# Patient Record
Sex: FEMALE | Race: WHITE | Employment: UNEMPLOYED | URBAN - METROPOLITAN AREA
[De-identification: names, ages, dates, MRNs, and addresses within clinical notes are randomized per-mention and may not be internally consistent; named-entity substitution may affect disease eponyms.]

---

## 2020-07-28 ENCOUNTER — TELEPHONE (OUTPATIENT)
Dept: ADMINISTRATIVE | Age: 60
End: 2020-07-28

## 2020-08-23 ENCOUNTER — HOSPITAL ENCOUNTER (INPATIENT)
Age: 60
LOS: 19 days | Discharge: NURSING, REHAB OR LONG-TERM CARE | DRG: 025 | End: 2020-09-11
Attending: EMERGENCY MEDICINE | Admitting: HOSPITALIST
Payer: COMMERCIAL

## 2020-08-23 ENCOUNTER — APPOINTMENT (OUTPATIENT)
Dept: ULTRASOUND IMAGING | Age: 60
DRG: 025 | End: 2020-08-23
Payer: COMMERCIAL

## 2020-08-23 ENCOUNTER — APPOINTMENT (OUTPATIENT)
Dept: GENERAL RADIOLOGY | Age: 60
DRG: 025 | End: 2020-08-23
Payer: COMMERCIAL

## 2020-08-23 PROBLEM — I21.4 NSTEMI (NON-ST ELEVATED MYOCARDIAL INFARCTION) (HCC): Status: ACTIVE | Noted: 2020-08-23

## 2020-08-23 PROBLEM — I21.4 NON-ST ELEVATION MI (NSTEMI) (HCC): Status: ACTIVE | Noted: 2020-08-23

## 2020-08-23 LAB
ACETAMINOPHEN LEVEL: <5 MCG/ML (ref 10–30)
ALBUMIN SERPL-MCNC: 4.2 G/DL (ref 3.5–5.2)
ALP BLD-CCNC: 54 U/L (ref 35–104)
ALT SERPL-CCNC: 27 U/L (ref 0–32)
AMPHETAMINE SCREEN, URINE: NOT DETECTED
ANION GAP SERPL CALCULATED.3IONS-SCNC: 14 MMOL/L (ref 7–16)
APTT: 29.6 SEC (ref 24.5–35.1)
AST SERPL-CCNC: 30 U/L (ref 0–31)
BACTERIA: ABNORMAL /HPF
BARBITURATE SCREEN URINE: NOT DETECTED
BASOPHILS ABSOLUTE: 0.06 E9/L (ref 0–0.2)
BASOPHILS RELATIVE PERCENT: 0.6 % (ref 0–2)
BENZODIAZEPINE SCREEN, URINE: NOT DETECTED
BILIRUB SERPL-MCNC: 1 MG/DL (ref 0–1.2)
BILIRUBIN URINE: NEGATIVE
BLOOD, URINE: ABNORMAL
BUN BLDV-MCNC: 19 MG/DL (ref 8–23)
CALCIUM SERPL-MCNC: 9.2 MG/DL (ref 8.6–10.2)
CANNABINOID SCREEN URINE: NOT DETECTED
CHLORIDE BLD-SCNC: 102 MMOL/L (ref 98–107)
CLARITY: CLEAR
CO2: 25 MMOL/L (ref 22–29)
COCAINE METABOLITE SCREEN URINE: NOT DETECTED
COLOR: YELLOW
CREAT SERPL-MCNC: 0.7 MG/DL (ref 0.5–1)
EKG ATRIAL RATE: 80 BPM
EKG P AXIS: 16 DEGREES
EKG P-R INTERVAL: 148 MS
EKG Q-T INTERVAL: 392 MS
EKG QRS DURATION: 84 MS
EKG QTC CALCULATION (BAZETT): 452 MS
EKG R AXIS: -21 DEGREES
EKG T AXIS: 15 DEGREES
EKG VENTRICULAR RATE: 80 BPM
EOSINOPHILS ABSOLUTE: 0.13 E9/L (ref 0.05–0.5)
EOSINOPHILS RELATIVE PERCENT: 1.4 % (ref 0–6)
ETHANOL: <10 MG/DL (ref 0–0.08)
FENTANYL SCREEN, URINE: NOT DETECTED
GFR AFRICAN AMERICAN: >60
GFR NON-AFRICAN AMERICAN: >60 ML/MIN/1.73
GLUCOSE BLD-MCNC: 107 MG/DL (ref 74–99)
GLUCOSE URINE: NEGATIVE MG/DL
HCT VFR BLD CALC: 45.8 % (ref 34–48)
HEMOGLOBIN: 14.8 G/DL (ref 11.5–15.5)
IMMATURE GRANULOCYTES #: 0.02 E9/L
IMMATURE GRANULOCYTES %: 0.2 % (ref 0–5)
INR BLD: 1.1
KETONES, URINE: ABNORMAL MG/DL
LEUKOCYTE ESTERASE, URINE: ABNORMAL
LYMPHOCYTES ABSOLUTE: 1.73 E9/L (ref 1.5–4)
LYMPHOCYTES RELATIVE PERCENT: 18.2 % (ref 20–42)
Lab: NORMAL
MAGNESIUM: 2.1 MG/DL (ref 1.6–2.6)
MCH RBC QN AUTO: 28.6 PG (ref 26–35)
MCHC RBC AUTO-ENTMCNC: 32.3 % (ref 32–34.5)
MCV RBC AUTO: 88.4 FL (ref 80–99.9)
METHADONE SCREEN, URINE: NOT DETECTED
MONOCYTES ABSOLUTE: 0.97 E9/L (ref 0.1–0.95)
MONOCYTES RELATIVE PERCENT: 10.2 % (ref 2–12)
NEUTROPHILS ABSOLUTE: 6.57 E9/L (ref 1.8–7.3)
NEUTROPHILS RELATIVE PERCENT: 69.4 % (ref 43–80)
NITRITE, URINE: NEGATIVE
OPIATE SCREEN URINE: NOT DETECTED
OXYCODONE URINE: NOT DETECTED
PDW BLD-RTO: 13.4 FL (ref 11.5–15)
PH UA: 7 (ref 5–9)
PHENCYCLIDINE SCREEN URINE: NOT DETECTED
PLATELET # BLD: 247 E9/L (ref 130–450)
PMV BLD AUTO: 11.2 FL (ref 7–12)
POTASSIUM SERPL-SCNC: 2.9 MMOL/L (ref 3.5–5)
PROTEIN UA: ABNORMAL MG/DL
PROTHROMBIN TIME: 12.6 SEC (ref 9.3–12.4)
RBC # BLD: 5.18 E12/L (ref 3.5–5.5)
RBC UA: ABNORMAL /HPF (ref 0–2)
REASON FOR REJECTION: NORMAL
REJECTED TEST: NORMAL
SALICYLATE, SERUM: <0.3 MG/DL (ref 0–30)
SODIUM BLD-SCNC: 141 MMOL/L (ref 132–146)
SPECIFIC GRAVITY UA: 1.01 (ref 1–1.03)
TOTAL PROTEIN: 7.7 G/DL (ref 6.4–8.3)
TRICYCLIC ANTIDEPRESSANTS SCREEN SERUM: NEGATIVE NG/ML
TROPONIN: 0.14 NG/ML (ref 0–0.03)
TROPONIN: 0.14 NG/ML (ref 0–0.03)
TROPONIN: 0.15 NG/ML (ref 0–0.03)
UROBILINOGEN, URINE: 0.2 E.U./DL
WBC # BLD: 9.5 E9/L (ref 4.5–11.5)
WBC UA: ABNORMAL /HPF (ref 0–5)

## 2020-08-23 PROCEDURE — 2500000003 HC RX 250 WO HCPCS: Performed by: NURSE PRACTITIONER

## 2020-08-23 PROCEDURE — 93970 EXTREMITY STUDY: CPT

## 2020-08-23 PROCEDURE — 93005 ELECTROCARDIOGRAM TRACING: CPT | Performed by: NURSE PRACTITIONER

## 2020-08-23 PROCEDURE — 99284 EMERGENCY DEPT VISIT MOD MDM: CPT

## 2020-08-23 PROCEDURE — 84484 ASSAY OF TROPONIN QUANT: CPT

## 2020-08-23 PROCEDURE — 71045 X-RAY EXAM CHEST 1 VIEW: CPT

## 2020-08-23 PROCEDURE — 81001 URINALYSIS AUTO W/SCOPE: CPT

## 2020-08-23 PROCEDURE — 83735 ASSAY OF MAGNESIUM: CPT

## 2020-08-23 PROCEDURE — 85025 COMPLETE CBC W/AUTO DIFF WBC: CPT

## 2020-08-23 PROCEDURE — 6370000000 HC RX 637 (ALT 250 FOR IP): Performed by: NURSE PRACTITIONER

## 2020-08-23 PROCEDURE — 2060000000 HC ICU INTERMEDIATE R&B

## 2020-08-23 PROCEDURE — 99285 EMERGENCY DEPT VISIT HI MDM: CPT

## 2020-08-23 PROCEDURE — 85610 PROTHROMBIN TIME: CPT

## 2020-08-23 PROCEDURE — 93010 ELECTROCARDIOGRAM REPORT: CPT | Performed by: INTERNAL MEDICINE

## 2020-08-23 PROCEDURE — 6370000000 HC RX 637 (ALT 250 FOR IP): Performed by: INTERNAL MEDICINE

## 2020-08-23 PROCEDURE — 80307 DRUG TEST PRSMV CHEM ANLYZR: CPT

## 2020-08-23 PROCEDURE — 85730 THROMBOPLASTIN TIME PARTIAL: CPT

## 2020-08-23 PROCEDURE — 2580000003 HC RX 258: Performed by: INTERNAL MEDICINE

## 2020-08-23 PROCEDURE — G0480 DRUG TEST DEF 1-7 CLASSES: HCPCS

## 2020-08-23 PROCEDURE — 80053 COMPREHEN METABOLIC PANEL: CPT

## 2020-08-23 PROCEDURE — 36415 COLL VENOUS BLD VENIPUNCTURE: CPT

## 2020-08-23 PROCEDURE — 6360000002 HC RX W HCPCS: Performed by: INTERNAL MEDICINE

## 2020-08-23 RX ORDER — METOPROLOL TARTRATE 5 MG/5ML
5 INJECTION INTRAVENOUS ONCE
Status: COMPLETED | OUTPATIENT
Start: 2020-08-23 | End: 2020-08-23

## 2020-08-23 RX ORDER — HYDRALAZINE HYDROCHLORIDE 20 MG/ML
5 INJECTION INTRAMUSCULAR; INTRAVENOUS EVERY 6 HOURS PRN
Status: DISCONTINUED | OUTPATIENT
Start: 2020-08-23 | End: 2020-09-09

## 2020-08-23 RX ORDER — POTASSIUM CHLORIDE 20 MEQ/1
40 TABLET, EXTENDED RELEASE ORAL ONCE
Status: COMPLETED | OUTPATIENT
Start: 2020-08-23 | End: 2020-08-23

## 2020-08-23 RX ORDER — ACETAMINOPHEN 650 MG/1
650 SUPPOSITORY RECTAL EVERY 6 HOURS PRN
Status: DISCONTINUED | OUTPATIENT
Start: 2020-08-23 | End: 2020-08-29

## 2020-08-23 RX ORDER — PROMETHAZINE HYDROCHLORIDE 25 MG/1
12.5 TABLET ORAL EVERY 6 HOURS PRN
Status: DISCONTINUED | OUTPATIENT
Start: 2020-08-23 | End: 2020-08-29

## 2020-08-23 RX ORDER — SODIUM CHLORIDE 0.9 % (FLUSH) 0.9 %
10 SYRINGE (ML) INJECTION EVERY 12 HOURS SCHEDULED
Status: DISCONTINUED | OUTPATIENT
Start: 2020-08-23 | End: 2020-09-11 | Stop reason: HOSPADM

## 2020-08-23 RX ORDER — ONDANSETRON 2 MG/ML
4 INJECTION INTRAMUSCULAR; INTRAVENOUS EVERY 6 HOURS PRN
Status: DISCONTINUED | OUTPATIENT
Start: 2020-08-23 | End: 2020-09-11 | Stop reason: HOSPADM

## 2020-08-23 RX ORDER — ASPIRIN 81 MG/1
81 TABLET, CHEWABLE ORAL DAILY
Status: DISCONTINUED | OUTPATIENT
Start: 2020-08-24 | End: 2020-08-28

## 2020-08-23 RX ORDER — ATORVASTATIN CALCIUM 80 MG/1
80 TABLET, FILM COATED ORAL NIGHTLY
Status: DISCONTINUED | OUTPATIENT
Start: 2020-08-23 | End: 2020-09-11 | Stop reason: HOSPADM

## 2020-08-23 RX ORDER — SODIUM CHLORIDE 0.9 % (FLUSH) 0.9 %
10 SYRINGE (ML) INJECTION PRN
Status: DISCONTINUED | OUTPATIENT
Start: 2020-08-23 | End: 2020-09-11 | Stop reason: HOSPADM

## 2020-08-23 RX ORDER — ENALAPRIL MALEATE 10 MG/1
10 TABLET ORAL DAILY
Status: DISCONTINUED | OUTPATIENT
Start: 2020-08-23 | End: 2020-09-11 | Stop reason: HOSPADM

## 2020-08-23 RX ORDER — ACETAMINOPHEN 325 MG/1
650 TABLET ORAL EVERY 6 HOURS PRN
Status: DISCONTINUED | OUTPATIENT
Start: 2020-08-23 | End: 2020-09-03

## 2020-08-23 RX ORDER — METOPROLOL SUCCINATE 50 MG/1
50 TABLET, EXTENDED RELEASE ORAL DAILY
Status: DISCONTINUED | OUTPATIENT
Start: 2020-08-23 | End: 2020-09-11 | Stop reason: HOSPADM

## 2020-08-23 RX ORDER — POLYETHYLENE GLYCOL 3350 17 G/17G
17 POWDER, FOR SOLUTION ORAL DAILY PRN
Status: DISCONTINUED | OUTPATIENT
Start: 2020-08-23 | End: 2020-09-11 | Stop reason: HOSPADM

## 2020-08-23 RX ADMIN — Medication 10 ML: at 20:32

## 2020-08-23 RX ADMIN — POTASSIUM CHLORIDE 40 MEQ: 1500 TABLET, EXTENDED RELEASE ORAL at 14:48

## 2020-08-23 RX ADMIN — METOPROLOL TARTRATE 5 MG: 5 INJECTION INTRAVENOUS at 16:07

## 2020-08-23 RX ADMIN — ATORVASTATIN CALCIUM 80 MG: 80 TABLET, FILM COATED ORAL at 20:33

## 2020-08-23 RX ADMIN — ENOXAPARIN SODIUM 40 MG: 40 INJECTION SUBCUTANEOUS at 17:41

## 2020-08-23 RX ADMIN — POTASSIUM BICARBONATE 40 MEQ: 782 TABLET, EFFERVESCENT ORAL at 17:40

## 2020-08-23 RX ADMIN — METOPROLOL SUCCINATE 50 MG: 50 TABLET, EXTENDED RELEASE ORAL at 18:24

## 2020-08-23 RX ADMIN — ENALAPRIL MALEATE 10 MG: 10 TABLET ORAL at 20:33

## 2020-08-23 RX ADMIN — HYDRALAZINE HYDROCHLORIDE 5 MG: 20 INJECTION INTRAMUSCULAR; INTRAVENOUS at 18:24

## 2020-08-23 ASSESSMENT — PAIN SCALES - GENERAL
PAINLEVEL_OUTOF10: 0
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
## 2020-08-23 NOTE — ED NOTES
Pt 1 bag of personal belongings obtained and locked in locker [address] [facility], [address] [place] [address]  08/23/20 2440 Staff Note:      Pt was interviewed personally by me.   I agree with the Resident's findings.     Pt was alert and oriented, though depressed with evidence of functional decline.  Treatment was recommended and lab ordered.  Results showed B12 of 166.  Pt's daughter (who is a nurse) was contacted and will begin monthly B12 injections of Pt at home.    STEFFANY
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
## 2020-08-23 NOTE — PROGRESS NOTES
Patient questioned on if she is suicidal and if she has had any thoughts of suicide and/or has a plan to harm herself. Patient denies any thoughts or plans of suicide.
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
## 2020-08-23 NOTE — PROGRESS NOTES
Patient asked the  to step out of her room while I was doing her admission database. Patient states she feels like he is recording her conversations.  When I stepped out of the room patient's  tells me that patient is suicidal.

## 2020-08-24 ENCOUNTER — TELEPHONE (OUTPATIENT)
Dept: CARDIOLOGY | Age: 60
End: 2020-08-24

## 2020-08-24 ENCOUNTER — APPOINTMENT (OUTPATIENT)
Dept: CT IMAGING | Age: 60
DRG: 025 | End: 2020-08-24
Payer: COMMERCIAL

## 2020-08-24 LAB
ALBUMIN SERPL-MCNC: 3.8 G/DL (ref 3.5–5.2)
ALP BLD-CCNC: 49 U/L (ref 35–104)
ALT SERPL-CCNC: 27 U/L (ref 0–32)
ANION GAP SERPL CALCULATED.3IONS-SCNC: 16 MMOL/L (ref 7–16)
AST SERPL-CCNC: 38 U/L (ref 0–31)
BILIRUB SERPL-MCNC: 1.1 MG/DL (ref 0–1.2)
BILIRUBIN DIRECT: <0.2 MG/DL (ref 0–0.3)
BILIRUBIN, INDIRECT: ABNORMAL MG/DL (ref 0–1)
BUN BLDV-MCNC: 16 MG/DL (ref 8–23)
CALCIUM SERPL-MCNC: 9.2 MG/DL (ref 8.6–10.2)
CHLORIDE BLD-SCNC: 103 MMOL/L (ref 98–107)
CHOLESTEROL, TOTAL: 182 MG/DL (ref 0–199)
CK MB: 23.5 NG/ML (ref 0–4.3)
CO2: 21 MMOL/L (ref 22–29)
CREAT SERPL-MCNC: 0.6 MG/DL (ref 0.5–1)
EKG ATRIAL RATE: 63 BPM
EKG P AXIS: 40 DEGREES
EKG P-R INTERVAL: 200 MS
EKG Q-T INTERVAL: 442 MS
EKG QRS DURATION: 70 MS
EKG QTC CALCULATION (BAZETT): 452 MS
EKG T AXIS: 21 DEGREES
EKG VENTRICULAR RATE: 63 BPM
GFR AFRICAN AMERICAN: >60
GFR NON-AFRICAN AMERICAN: >60 ML/MIN/1.73
GLUCOSE BLD-MCNC: 93 MG/DL (ref 74–99)
HCT VFR BLD CALC: 48.4 % (ref 34–48)
HDLC SERPL-MCNC: 44 MG/DL
HEMOGLOBIN: 15.1 G/DL (ref 11.5–15.5)
LDL CHOLESTEROL CALCULATED: 111 MG/DL (ref 0–99)
MCH RBC QN AUTO: 29.1 PG (ref 26–35)
MCHC RBC AUTO-ENTMCNC: 31.2 % (ref 32–34.5)
MCV RBC AUTO: 93.3 FL (ref 80–99.9)
PDW BLD-RTO: 14.1 FL (ref 11.5–15)
PLATELET # BLD: 239 E9/L (ref 130–450)
PMV BLD AUTO: 11.5 FL (ref 7–12)
POTASSIUM REFLEX MAGNESIUM: 4.6 MMOL/L (ref 3.5–5)
RBC # BLD: 5.19 E12/L (ref 3.5–5.5)
SODIUM BLD-SCNC: 140 MMOL/L (ref 132–146)
TOTAL PROTEIN: 7.7 G/DL (ref 6.4–8.3)
TRIGL SERPL-MCNC: 133 MG/DL (ref 0–149)
TROPONIN: 0.1 NG/ML (ref 0–0.03)
VLDLC SERPL CALC-MCNC: 27 MG/DL
WBC # BLD: 8.8 E9/L (ref 4.5–11.5)

## 2020-08-24 PROCEDURE — 80048 BASIC METABOLIC PNL TOTAL CA: CPT

## 2020-08-24 PROCEDURE — 2580000003 HC RX 258: Performed by: INTERNAL MEDICINE

## 2020-08-24 PROCEDURE — 2060000000 HC ICU INTERMEDIATE R&B

## 2020-08-24 PROCEDURE — 99254 IP/OBS CNSLTJ NEW/EST MOD 60: CPT | Performed by: STUDENT IN AN ORGANIZED HEALTH CARE EDUCATION/TRAINING PROGRAM

## 2020-08-24 PROCEDURE — 6360000002 HC RX W HCPCS: Performed by: NURSE PRACTITIONER

## 2020-08-24 PROCEDURE — 6360000002 HC RX W HCPCS: Performed by: INTERNAL MEDICINE

## 2020-08-24 PROCEDURE — 93005 ELECTROCARDIOGRAM TRACING: CPT | Performed by: INTERNAL MEDICINE

## 2020-08-24 PROCEDURE — 99252 IP/OBS CONSLTJ NEW/EST SF 35: CPT | Performed by: NURSE PRACTITIONER

## 2020-08-24 PROCEDURE — APPSS45 APP SPLIT SHARED TIME 31-45 MINUTES: Performed by: NURSE PRACTITIONER

## 2020-08-24 PROCEDURE — 70450 CT HEAD/BRAIN W/O DYE: CPT

## 2020-08-24 PROCEDURE — 93010 ELECTROCARDIOGRAM REPORT: CPT | Performed by: INTERNAL MEDICINE

## 2020-08-24 PROCEDURE — 85027 COMPLETE CBC AUTOMATED: CPT

## 2020-08-24 PROCEDURE — 84484 ASSAY OF TROPONIN QUANT: CPT

## 2020-08-24 PROCEDURE — 82553 CREATINE MB FRACTION: CPT

## 2020-08-24 PROCEDURE — 80061 LIPID PANEL: CPT

## 2020-08-24 PROCEDURE — 6370000000 HC RX 637 (ALT 250 FOR IP): Performed by: NURSE PRACTITIONER

## 2020-08-24 PROCEDURE — 36415 COLL VENOUS BLD VENIPUNCTURE: CPT

## 2020-08-24 PROCEDURE — 6370000000 HC RX 637 (ALT 250 FOR IP): Performed by: INTERNAL MEDICINE

## 2020-08-24 PROCEDURE — 80076 HEPATIC FUNCTION PANEL: CPT

## 2020-08-24 RX ORDER — ASPIRIN 81 MG/1
243 TABLET, CHEWABLE ORAL ONCE
Status: COMPLETED | OUTPATIENT
Start: 2020-08-24 | End: 2020-08-24

## 2020-08-24 RX ORDER — ASPIRIN 81 MG/1
243 TABLET, CHEWABLE ORAL DAILY
Status: DISCONTINUED | OUTPATIENT
Start: 2020-08-24 | End: 2020-08-24 | Stop reason: SDUPTHER

## 2020-08-24 RX ADMIN — ASPIRIN 81 MG: 81 TABLET, CHEWABLE ORAL at 09:21

## 2020-08-24 RX ADMIN — SODIUM CHLORIDE, PRESERVATIVE FREE 10 ML: 5 INJECTION INTRAVENOUS at 22:58

## 2020-08-24 RX ADMIN — ACETAMINOPHEN 650 MG: 325 TABLET, FILM COATED ORAL at 21:57

## 2020-08-24 RX ADMIN — METOPROLOL SUCCINATE 50 MG: 50 TABLET, EXTENDED RELEASE ORAL at 09:21

## 2020-08-24 RX ADMIN — Medication 10 ML: at 21:57

## 2020-08-24 RX ADMIN — ENOXAPARIN SODIUM 40 MG: 40 INJECTION SUBCUTANEOUS at 13:05

## 2020-08-24 RX ADMIN — ACETAMINOPHEN 650 MG: 325 TABLET, FILM COATED ORAL at 08:00

## 2020-08-24 RX ADMIN — ENALAPRIL MALEATE 10 MG: 10 TABLET ORAL at 09:21

## 2020-08-24 RX ADMIN — ASPIRIN 243 MG: 81 TABLET, CHEWABLE ORAL at 10:36

## 2020-08-24 RX ADMIN — ACETAMINOPHEN 650 MG: 325 TABLET, FILM COATED ORAL at 01:47

## 2020-08-24 RX ADMIN — Medication 10 ML: at 09:21

## 2020-08-24 RX ADMIN — HYDRALAZINE HYDROCHLORIDE 5 MG: 20 INJECTION INTRAMUSCULAR; INTRAVENOUS at 22:58

## 2020-08-24 RX ADMIN — HYDRALAZINE HYDROCHLORIDE 5 MG: 20 INJECTION INTRAMUSCULAR; INTRAVENOUS at 15:45

## 2020-08-24 RX ADMIN — ATORVASTATIN CALCIUM 80 MG: 80 TABLET, FILM COATED ORAL at 21:57

## 2020-08-24 ASSESSMENT — PAIN DESCRIPTION - PROGRESSION
CLINICAL_PROGRESSION: NOT CHANGED
CLINICAL_PROGRESSION: NOT CHANGED

## 2020-08-24 ASSESSMENT — PAIN SCALES - GENERAL
PAINLEVEL_OUTOF10: 7
PAINLEVEL_OUTOF10: 5
PAINLEVEL_OUTOF10: 0
PAINLEVEL_OUTOF10: 6
PAINLEVEL_OUTOF10: 6
PAINLEVEL_OUTOF10: 5
PAINLEVEL_OUTOF10: 0
PAINLEVEL_OUTOF10: 8
PAINLEVEL_OUTOF10: 0

## 2020-08-24 ASSESSMENT — PAIN DESCRIPTION - LOCATION
LOCATION: BACK;TEETH
LOCATION: LEG
LOCATION: TEETH;BACK

## 2020-08-24 ASSESSMENT — PAIN DESCRIPTION - PAIN TYPE
TYPE: CHRONIC PAIN

## 2020-08-24 ASSESSMENT — PAIN DESCRIPTION - FREQUENCY
FREQUENCY: INTERMITTENT
FREQUENCY: INTERMITTENT

## 2020-08-24 ASSESSMENT — PAIN DESCRIPTION - ORIENTATION
ORIENTATION: LEFT;LOWER
ORIENTATION: LEFT;LOWER
ORIENTATION: LEFT

## 2020-08-24 ASSESSMENT — PAIN DESCRIPTION - ONSET
ONSET: GRADUAL
ONSET: GRADUAL

## 2020-08-24 ASSESSMENT — PAIN DESCRIPTION - DESCRIPTORS
DESCRIPTORS: ACHING;CONSTANT;DISCOMFORT
DESCRIPTORS: ACHING;CONSTANT;DISCOMFORT

## 2020-08-24 NOTE — CONSULTS
Inpatient Cardiology Consultation      Reason for Consult: Elevated troponin    Consulting Physician: Dr. Mathew Oliva    Requesting Physician:   Dr. Ovi Todd    Date of Consultation: 8/24/2020    HISTORY OF PRESENT ILLNESS:     The following information is mostly taken from a review of electronic medical records due to the patient's mental status changes. I attempted calling her  to obtain a history but there was no answer and no voicemail. There appears to be no cardiac history or any prior association with [facility] cardiology. She was brought to the emergency room yesterday by her  for mental status changes. She admits to having a squeezing type chest discomfort that occurred Friday while she was sitting at a restaurant with her  eating fish. It is unclear if she ever had chest pain in the past or if she has a history of GERD or heartburn or indigestion. She states the pain was bad enough that she noticed it and it lasted over 1/2-hour. Her  drove her home and she had no vomiting or belly pain. The next day her  brought her to the emergency room for mental status changes because she was acting \"bizarre\". In the emergency room, she was having flight of ideas right and her blood pressure was elevated at 200/103. EKG showed sinus rhythm with poor R wave progression but no acute ST-T wave changes. Troponins were elevated at 0.16. Potassium was 2.9 and that was treated. Later magnesium was 2.0. Chest x-ray was unremarkable. She was given IV Lopressor and hydrochlorothiazide was stopped. She has been started on an ACE inhibitor as well as Toprol and Lipitor. Second troponin was 0.15 and third troponin is 0.1 and MB is pending. Past medical history  1. Obesity  2. Tobacco abuse and quit in 2009  3. Hypertension  4. Brain aneurysm surgery in 2009  5.  Lumbar herniated disc      Medications Prior to admit:  Prior to Admission medications    Medication Sig Start Date End Date Taking? Authorizing Provider   ibuprofen (ADVIL;MOTRIN) 200 MG tablet Take 400 mg by mouth every 6 hours as needed for Pain    Historical Provider, MD   aspirin 81 MG tablet Take 81 mg by mouth daily    Historical Provider, MD   Multiple Vitamin (MULTIVITAMINS PO) Take by mouth    Historical Provider, MD   Calcium Citrate-Vitamin D (CALCIUM + D PO) Take by mouth    Historical Provider, MD   KRILL OIL PO Take by mouth    Historical Provider, MD   Omega-3 Fatty Acids (FISH OIL PO) Take by mouth    Historical Provider, MD   Ascorbic Acid (VITAMIN C) 500 MG tablet Take 500 mg by mouth daily    Historical Provider, MD   Coenzyme Q10 (CO Q 10 PO) Take by mouth    Historical Provider, MD   naproxen (NAPROSYN) 500 MG tablet Take 1 tablet by mouth 2 times daily for 7 days 1/24/17 1/31/17  AMARA Vizcarra   hydrochlorothiazide (HYDRODIURIL) 50 MG tablet Take 1 tablet by mouth 2 times daily 1/24/17 2/23/17  AMARA Vizcarra       Current Medications:    Current Facility-Administered Medications: aspirin chewable tablet 243 mg, 243 mg, Oral, Once  sodium chloride flush 0.9 % injection 10 mL, 10 mL, Intravenous, 2 times per day  sodium chloride flush 0.9 % injection 10 mL, 10 mL, Intravenous, PRN  acetaminophen (TYLENOL) tablet 650 mg, 650 mg, Oral, Q6H PRN **OR** acetaminophen (TYLENOL) suppository 650 mg, 650 mg, Rectal, Q6H PRN  polyethylene glycol (GLYCOLAX) packet 17 g, 17 g, Oral, Daily PRN  promethazine (PHENERGAN) tablet 12.5 mg, 12.5 mg, Oral, Q6H PRN **OR** ondansetron (ZOFRAN) injection 4 mg, 4 mg, Intravenous, Q6H PRN  atorvastatin (LIPITOR) tablet 80 mg, 80 mg, Oral, Nightly  aspirin chewable tablet 81 mg, 81 mg, Oral, Daily  enalapril (VASOTEC) tablet 10 mg, 10 mg, Oral, Daily  metoprolol succinate (TOPROL XL) extended release tablet 50 mg, 50 mg, Oral, Daily  hydrALAZINE (APRESOLINE) injection 5 mg, 5 mg, Intravenous, Q6H PRN    Allergies:  Patient has no known allergies.     Social History: Quit smoking in 2009 and no alcohol or drug abuse      Family History: Unable to obtain  History reviewed. No pertinent family history. · REVIEW OF SYSTEMS:   Unable to obtain     PHYSICAL EXAM:   BP (!) 142/72   Pulse 65   Temp 97 °F (36.1 °C) (Temporal)   Resp 14   Ht 5' 3\" (1.6 m)   Wt 201 lb 8 oz (91.4 kg)   SpO2 94%   BMI 35.69 kg/m²   CONST:  Well developed, well nourished who appears of stated age. Awake, alert and cooperative. No apparent distress. HEENT:   Head- Normocephalic, atraumatic   Eyes- Conjunctivae pink, anicteric  Throat- Oral mucosa pink and moist  Neck-  No stridor, trachea midline, no jugular venous distention. No carotid bruit. CHEST: Chest symmetrical and non-tender to palpation. No accessory muscle use or intercostal retractions  RESPIRATORY: Lung sounds - clear throughout fields   CARDIOVASCULAR:     Heart Inspection- shows no noted pulsations  Heart Palpation- no heaves or thrills; PMI is non-displaced   Heart Ausculation- Regular rate and rhythm, no murmur. No s3, s4 or rub   PV: No lower extremity edema. No varicosities. Pedal pulses palpable, no clubbing or cyanosis   ABDOMEN: Soft, non-tender to light palpation. Bowel sounds present. No palpable masses no organomegaly; no abdominal bruit  MS: Good muscle strength and tone. No atrophy or abnormal movements. : Deferred  SKIN: Warm and dry no statis dermatitis or ulcers   NEURO / PSYCH: Oriented to person, place and time. Speech clear and appropriate. Follows all commands.  Pleasant affect     DATA:    ECG / Tele strips: Sinus rhythm  Diagnostic:      Intake/Output Summary (Last 24 hours) at 8/24/2020 1028  Last data filed at 8/24/2020 0538  Gross per 24 hour   Intake 360 ml   Output 200 ml   Net 160 ml       Labs:   CBC:   Recent Labs     08/23/20  1138 08/24/20  0424   WBC 9.5 8.8   HGB 14.8 15.1   HCT 45.8 48.4*    239     BMP:   Recent Labs     08/23/20  1138 08/24/20  0424    140   K 2.9* 4.6 CO2 25 21*   BUN 19 16   CREATININE 0.7 0.6   LABGLOM >60 >60   CALCIUM 9.2 9.2     Mag:   Recent Labs     08/23/20  1138   MG 2.1     Phos: No results for input(s): PHOS in the last 72 hours. TSH: No results for input(s): TSH in the last 72 hours. HgA1c:   Lab Results   Component Value Date    LABA1C 5.6 05/11/2017     No results found for: EAG  proBNP: No results for input(s): PROBNP in the last 72 hours. PT/INR:   Recent Labs     08/23/20  1513   PROTIME 12.6*   INR 1.1     APTT:  Recent Labs     08/23/20  1513   APTT 29.6     CARDIAC ENZYMES:  Recent Labs     08/23/20  1623 08/23/20  2224 08/24/20  0424   TROPONINI 0.14* 0.15* 0.10*     FASTING LIPID PANEL:  Lab Results   Component Value Date    CHOL 182 08/24/2020    HDL 44 08/24/2020    LDLCALC 111 08/24/2020    TRIG 133 08/24/2020     LIVER PROFILE:  Recent Labs     08/23/20  1138 08/24/20  0424   AST 30 38*   ALT 27 27   LABALBU 4.2 3.8       Electronically signed by REAGAN Lao CNP on 8/24/2020 at 10:28 AM      ASSESSMENT  1. Chest pain which is somewhat typical and somewhat atypical and occurred a day prior to admission, with no acute EKG changes but with elevated troponins  2. Uncontrolled hypertension  3 mental status changes. PLAN  1. Obtain 2D echo , CK-mb  2.  mg today and then 81 mg daily  3. Agree with Lipitor, beta blocker, management of bp  4. Further evaluation based on echo     Assessment and plan by Lauren Burt    ______________________________________________________________________  I independently interviewed and examined the patient. I have reviewed the above documentation completed by the CHENCHO. Please see my additional contributions to the HPI, physical exam, and assessment / medical decision making.     HPI, ROS, PMH, PSH, [address], [place], and medications independently reviewed (agree; see above documentation)    Review of Systems:  Cardiac: As per HPI  General: No fever, chills  Pulmonary: As per HPI  GI: No nausea, vomiting  Musculoskeletal: NESS x 4, no focal motor deficits  Skin: Intact, no rashes  Neuro/Psych: No headache or seizures    Physical Exam:  BP (!) 142/72   Pulse 65   Temp 97 °F (36.1 °C) (Temporal)   Resp 14   Ht 5' 3\" (1.6 m)   Wt 201 lb 8 oz (91.4 kg)   SpO2 94%   BMI 35.69 kg/m²   Appearance: Awake, alert, no acute respiratory distress  Skin: Intact, no rash  Head: Normocephalic, atraumatic  Neck: Supple, no carotid bruits  Lungs: Clear to auscultation bilaterally. No wheezes, rales, or rhonchi. Cardiac: Regular rate and rhythm, +S1S2, no murmurs apparent  Abdomen: Soft, +bowel sounds  Extremities: Moves all extremities x 4, no lower extremity edema  Neurologic: No focal motor deficits apparent, normal mood and affect    Assessment/Plan:  67-year-old female with past medical history of hypertension and intracranial bleeding due to aneurysm admitted here due to hallucinations and altered mental status to the psychiatry inpatient. We are consulted for elevated troponin. Patient reports chest tightness a few days ago that resolved. Patient is unable to provide characteristics of the pain due to flight of ideas during history taking. Troponin peaked at 0.16. Patient is not having any chest pain or any cardiac symptoms at this point. Systolic blood pressure was 200 mmHg at presentation. No congestive heart failure signs on physical exam.  ECG showed no ischemic abnormalities. Recommendations  Differential diagnosis includes NSTEMI versus hypertensive emergency. We tried to reach for her  to obtain more history but he was unavailable. I am unable to tell if the chest pain is related to her elevated troponin. Unfortunately, history taking is challenging. We will obtain a transthoracic echocardiogram to evaluate for any wall motion abnormalities.   For now, I do not have enough evidence that this is an NSTEMI that warrants starting medical therapy for ACS especially that her blood pressure was severely elevated on presentation. Control blood pressure. Agree with metoprolol and ASA for now.       Mariaelena Francois MD  [facility]) Cardiology

## 2020-08-24 NOTE — PROGRESS NOTES
Hospital Medicine Progress Note      Date of Admission: 8/23/2020  Hospital day # 1    Course: Follow-up on elevated troponins, accelerated hypertension, psychosis    Subjective    Patient refers no chest pain, no palpitations  Difficult patient, patient confused at times  Stable overnight. No other overnight issues reported. Exam:    BP (!) 142/72   Pulse 65   Temp 97 °F (36.1 °C) (Temporal)   Resp 14   Ht 5' 3\" (1.6 m)   Wt 201 lb 8 oz (91.4 kg)   SpO2 94%   BMI 35.69 kg/m²     General appearance: No apparent distress, appears stated age and cooperative. HEENT: Pupils equal, round, and reactive to light. Conjunctivae/corneas clear. Neck: Supple. No jugular venous distention. Trachea midline. Respiratory:  Normal respiratory effort. Clear to auscultation, bilaterally without Rales/Wheezes/Rhonchi. Cardiovascular: S1/S2   Abdomen: Soft, non-tender, non-distended with normal bowel sounds. Musculoskeletal: No clubbing, cyanosis or edema bilaterally.    Skin:  No rashes    Neurologic: awake, alert and following commands     Medications:  Reviewed    Infusion Medications   Scheduled Medications    sodium chloride flush  10 mL Intravenous 2 times per day    atorvastatin  80 mg Oral Nightly    aspirin  81 mg Oral Daily    enoxaparin  40 mg Subcutaneous Daily    enalapril  10 mg Oral Daily    metoprolol succinate  50 mg Oral Daily     PRN Meds: sodium chloride flush, acetaminophen **OR** acetaminophen, polyethylene glycol, promethazine **OR** ondansetron, hydrALAZINE    I/O    Intake/Output Summary (Last 24 hours) at 8/24/2020 0904  Last data filed at 8/24/2020 0538  Gross per 24 hour   Intake 360 ml   Output 200 ml   Net 160 ml       Labs:   Recent Labs     08/23/20  1138 08/24/20  0424   WBC 9.5 8.8   HGB 14.8 15.1   HCT 45.8 48.4*    239       Recent Labs     08/23/20  1138 08/24/20  0424    140   K 2.9* 4.6    103   CO2 25 21*   BUN 19 16   CREATININE 0.7 0.6   CALCIUM 9.2 9.2       Recent Labs     08/23/20  1138 08/24/20  0424   PROT 7.7 7.7   ALKPHOS 54 49   ALT 27 27   AST 30 38*   BILITOT 1.0 1.1       Recent Labs     08/23/20  1513   INR 1.1       Recent Labs     08/23/20  1623 08/23/20  2224 08/24/20  0424   TROPONINI 0.14* 0.15* 0.10*       Other labs:  Lab Results   Component Value Date    CHOL 182 08/24/2020    TRIG 133 08/24/2020    HDL 44 08/24/2020    LDLCALC 111 (H) 08/24/2020    TSH 1.552 10/12/2010    INR 1.1 08/23/2020    LABA1C 5.6 05/11/2017       Radiology:  Imaging studies reviewed today. ASSESSMENT:    Elevated troponins  Hypertensive urgency  Possible NSTEMI  Psychosis  Hypokalemia  Hypertension  Acute psychosis  Previous history of brain aneurysm  Edema lower extremities         PLAN:      Monitor on telemetry  Echocardiogram  Cardiology consultation  Replaced potassium  Discontinued HCTZ  Hydralazine IV as needed for now  Continue Toprol  Continue Enalapril  Lower extremities venous Doppler no evidence of DVT  Head CT wo contrast  Psychiatry consultation          Diet: DIET CLEAR LIQUID; No Caffeine  Code Status: Full Code    PT/OT Eval Status: [] Ordered [] Evaluation noted [x] Not applicable    DVT Prophylaxis: []Lovenox []Heparin [x]PCD [] [address] []Encouraged ambulation []N/A    Likely disposition when able:  []Home [] [facility] with [facility] [] SNF/LOLLY [] Acute Rehab [] LTAC [x]Other    +++++++++++++++++++++++++++++++++++++++++++++++++  Verena Kimble MD, Hospitalist  +++++++++++++++++++++++++++++++++++++++++++++++++  NOTE: This report was transcribed using voice recognition software.  Every effort was made to ensure accuracy; however, inadvertent computerized transcription errors may be present.

## 2020-08-25 PROCEDURE — 6370000000 HC RX 637 (ALT 250 FOR IP): Performed by: INTERNAL MEDICINE

## 2020-08-25 PROCEDURE — 2060000000 HC ICU INTERMEDIATE R&B

## 2020-08-25 PROCEDURE — 6360000002 HC RX W HCPCS: Performed by: NURSE PRACTITIONER

## 2020-08-25 PROCEDURE — 2580000003 HC RX 258: Performed by: INTERNAL MEDICINE

## 2020-08-25 RX ADMIN — Medication 10 ML: at 08:38

## 2020-08-25 RX ADMIN — ENOXAPARIN SODIUM 40 MG: 40 INJECTION SUBCUTANEOUS at 08:38

## 2020-08-25 RX ADMIN — ATORVASTATIN CALCIUM 80 MG: 80 TABLET, FILM COATED ORAL at 19:54

## 2020-08-25 RX ADMIN — ENALAPRIL MALEATE 10 MG: 10 TABLET ORAL at 08:37

## 2020-08-25 RX ADMIN — METOPROLOL SUCCINATE 50 MG: 50 TABLET, EXTENDED RELEASE ORAL at 08:37

## 2020-08-25 RX ADMIN — ASPIRIN 81 MG: 81 TABLET, CHEWABLE ORAL at 08:37

## 2020-08-25 RX ADMIN — Medication 10 ML: at 19:54

## 2020-08-25 ASSESSMENT — PAIN SCALES - GENERAL
PAINLEVEL_OUTOF10: 0

## 2020-08-25 NOTE — CONSULTS
Procedure Laterality Date    BRAIN SURGERY      TONSILLECTOMY         Medications Prior to Admission:   Medications Prior to Admission: ibuprofen (ADVIL;MOTRIN) 200 MG tablet, Take 400 mg by mouth every 6 hours as needed for Pain  aspirin 81 MG tablet, Take 81 mg by mouth daily  Multiple Vitamin (MULTIVITAMINS PO), Take by mouth  Calcium Citrate-Vitamin D (CALCIUM + D PO), Take by mouth  KRILL OIL PO, Take by mouth  Omega-3 Fatty Acids (FISH OIL PO), Take by mouth  Ascorbic Acid (VITAMIN C) 500 MG tablet, Take 500 mg by mouth daily  Coenzyme Q10 (CO Q 10 PO), Take by mouth  naproxen (NAPROSYN) 500 MG tablet, Take 1 tablet by mouth 2 times daily for 7 days  hydrochlorothiazide (HYDRODIURIL) 50 MG tablet, Take 1 tablet by mouth 2 times daily    Allergies:  Patient has no known allergies. FAMILY/SOCIAL HISTORY:  History reviewed. No pertinent family history.   Social History     Socioeconomic History    Marital status:      Spouse name: Not on file    Number of children: Not on file    Years of education: Not on file    Highest education level: Not on file   Occupational History    Not on file   Social Needs    Financial resource strain: Not on file    Food insecurity     Worry: Not on file     Inability: Not on file    Transportation needs     Medical: Not on file     Non-medical: Not on file   Tobacco Use    Smoking status: Former Smoker     Types: Cigarettes     Last attempt to quit: 2009     Years since quittin.5   Substance and Sexual Activity    Alcohol use: No    Drug use: No    Sexual activity: Yes   Lifestyle    Physical activity     Days per week: Not on file     Minutes per session: Not on file    Stress: Not on file   Relationships    Social connections     Talks on phone: Not on file     Gets together: Not on file     Attends Judaism service: Not on file     Active member of club or organization: Not on file     Attends meetings of clubs or organizations: Not on file     Relationship status: Not on file    Intimate partner violence     Fear of current or ex partner: Not on file     Emotionally abused: Not on file     Physically abused: Not on file     Forced sexual activity: Not on file   Other Topics Concern    Not on file   Social History Narrative    Not on file       .     PHYSICAL EXAM:  Vitals:  BP (!) 184/90   Pulse 74   Temp 97.2 °F (36.2 °C) (Temporal)   Resp 16   Ht 5' 3\" (1.6 m)   Wt 201 lb 8 oz (91.4 kg)   SpO2 98%   BMI 35.69 kg/m²        Mental Status Examination:    Level of consciousness:  within normal limits   Appearance:  well-appearing  Behavior/Motor:  no abnormalities noted  Attitude toward examiner:  cooperative  Speech:  hyperverbal   Mood: depressed and sad  Affect:  blunted and flat  Thought processes:  tangential and circumstantial   Thought content: With paranoia and recent suicidal ideations also reports auditory hallucinations  Cognition:  oriented to person, place, and time   Concentration intact  Memory intact  Insight poor   Judgement poor   Fund of Knowledge limited      DIAGNOSIS:  Psychosis NOS      RISK ASSESSMENT:        LABS: REVIEWED TODAY:  Recent Labs     08/23/20  1138 08/24/20  0424   WBC 9.5 8.8   HGB 14.8 15.1    239     Recent Labs     08/23/20  1138 08/24/20  0424    140   K 2.9* 4.6    103   CO2 25 21*   BUN 19 16   CREATININE 0.7 0.6   GLUCOSE 107* 93     Recent Labs     08/23/20  1138 08/24/20  0424   BILITOT 1.0 1.1   ALKPHOS 54 49   AST 30 38*   ALT 27 27     Lab Results   Component Value Date    LABAMPH NOT DETECTED 08/23/2020    BARBSCNU NOT DETECTED 08/23/2020    LABBENZ NOT DETECTED 08/23/2020    LABMETH NOT DETECTED 08/23/2020    OPIATESCREENURINE NOT DETECTED 08/23/2020    PHENCYCLIDINESCREENURINE NOT DETECTED 08/23/2020    ETOH <10 08/23/2020     Lab Results   Component Value Date    TSH 1.552 10/12/2010     No results found for: LITHIUM  No results found for: VALPROATE, CBMZ  No results needed.     Electronically signed by REAGAN Denis CNP on 9/03/0755 at 11:22 PM

## 2020-08-25 NOTE — PROGRESS NOTES
Hospital Medicine Progress Note      Date of Admission: 8/23/2020  Hospital day # 2    Course: Follow-up on elevated troponins, accelerated hypertension, psychosis    Subjective    Patient anxious, paranoid  Stable overnight. No other overnight issues reported. Exam:    /70   Pulse 70   Temp 97.3 °F (36.3 °C) (Temporal)   Resp 16   Ht 5' 3\" (1.6 m)   Wt 203 lb 3.2 oz (92.2 kg)   SpO2 98%   BMI 36.00 kg/m²     General appearance: No apparent distress, appears stated age and cooperative. HEENT: Pupils equal, round, and reactive to light. Conjunctivae/corneas clear. Neck: Supple. No jugular venous distention. Trachea midline. Respiratory:  Normal respiratory effort. Clear to auscultation, bilaterally without Rales/Wheezes/Rhonchi. Cardiovascular: S1/S2   Abdomen: Soft, non-tender, non-distended with normal bowel sounds. Musculoskeletal: No clubbing, cyanosis or edema bilaterally.    Skin:  No rashes    Neurologic: awake, alert and following commands     Medications:  Reviewed    Infusion Medications   Scheduled Medications    enoxaparin  40 mg Subcutaneous Daily    sodium chloride flush  10 mL Intravenous 2 times per day    atorvastatin  80 mg Oral Nightly    aspirin  81 mg Oral Daily    enalapril  10 mg Oral Daily    metoprolol succinate  50 mg Oral Daily     PRN Meds: sodium chloride flush, acetaminophen **OR** acetaminophen, polyethylene glycol, promethazine **OR** ondansetron, hydrALAZINE    I/O    Intake/Output Summary (Last 24 hours) at 8/25/2020 1225  Last data filed at 8/24/2020 1910  Gross per 24 hour   Intake 730 ml   Output 200 ml   Net 530 ml       Labs:   Recent Labs     08/23/20  1138 08/24/20  0424   WBC 9.5 8.8   HGB 14.8 15.1   HCT 45.8 48.4*    239       Recent Labs     08/23/20  1138 08/24/20  0424    140   K 2.9* 4.6    103   CO2 25 21*   BUN 19 16   CREATININE 0.7 0.6   CALCIUM 9.2 9.2       Recent Labs     08/23/20  1138 08/24/20  0424   PROT 7.7 7. 7   ALKPHOS 54 49   ALT 27 27   AST 30 38*   BILITOT 1.0 1.1       Recent Labs     08/23/20  1513   INR 1.1       Recent Labs     08/23/20  1623 08/23/20  2224 08/24/20  0424   TROPONINI 0.14* 0.15* 0.10*       Other labs:  Lab Results   Component Value Date    CHOL 182 08/24/2020    TRIG 133 08/24/2020    HDL 44 08/24/2020    LDLCALC 111 (H) 08/24/2020    TSH 1.552 10/12/2010    INR 1.1 08/23/2020    LABA1C 5.6 05/11/2017       Radiology:  Imaging studies reviewed today. ASSESSMENT:  Enlarging parasagittal meningioma causing mass-effect in the left occipital lobe  Elevated troponins  Hypertensive urgency  Possible NSTEMI  Psychosis  Hypokalemia  Hypertension  Acute psychosis  Previous history of brain aneurysm  Edema lower extremities         PLAN:  Neurosurgery consulted  Echocardiogram  Cardiology following  Replaced potassium  Discontinued HCTZ  Hydralazine IV as needed for now  Continue Toprol  Continue Enalapril      Diet: DIET CARDIAC; No Caffeine  Code Status: Full Code    PT/OT Eval Status: [] Ordered [] Evaluation noted [x] Not applicable    DVT Prophylaxis: []Lovenox []Heparin [x]PCD [] [address] []Encouraged ambulation []N/A    Likely disposition when able:  []Home [] Home with [facility] [] SNF/LOLLY [] Acute Rehab [] LTAC [x]Other    +++++++++++++++++++++++++++++++++++++++++++++++++  Osie Factor, DO, Hospitalist  +++++++++++++++++++++++++++++++++++++++++++++++++  NOTE: This report was transcribed using voice recognition software.  Every effort was made to ensure accuracy; however, inadvertent computerized transcription errors may be present.

## 2020-08-26 ENCOUNTER — APPOINTMENT (OUTPATIENT)
Dept: CT IMAGING | Age: 60
DRG: 025 | End: 2020-08-26
Payer: COMMERCIAL

## 2020-08-26 LAB
ANION GAP SERPL CALCULATED.3IONS-SCNC: 18 MMOL/L (ref 7–16)
BUN BLDV-MCNC: 15 MG/DL (ref 8–23)
CALCIUM SERPL-MCNC: 9.2 MG/DL (ref 8.6–10.2)
CHLORIDE BLD-SCNC: 103 MMOL/L (ref 98–107)
CO2: 22 MMOL/L (ref 22–29)
CREAT SERPL-MCNC: 0.7 MG/DL (ref 0.5–1)
GFR AFRICAN AMERICAN: >60
GFR NON-AFRICAN AMERICAN: >60 ML/MIN/1.73
GLUCOSE BLD-MCNC: 146 MG/DL (ref 74–99)
HCT VFR BLD CALC: 47.1 % (ref 34–48)
HEMOGLOBIN: 15.1 G/DL (ref 11.5–15.5)
LV EF: 63 %
LVEF MODALITY: NORMAL
MAGNESIUM: 2 MG/DL (ref 1.6–2.6)
MCH RBC QN AUTO: 28.7 PG (ref 26–35)
MCHC RBC AUTO-ENTMCNC: 32.1 % (ref 32–34.5)
MCV RBC AUTO: 89.4 FL (ref 80–99.9)
PDW BLD-RTO: 13.5 FL (ref 11.5–15)
PLATELET # BLD: 229 E9/L (ref 130–450)
PMV BLD AUTO: 11.2 FL (ref 7–12)
POTASSIUM SERPL-SCNC: 3.7 MMOL/L (ref 3.5–5)
RBC # BLD: 5.27 E12/L (ref 3.5–5.5)
SODIUM BLD-SCNC: 143 MMOL/L (ref 132–146)
TROPONIN: 0.12 NG/ML (ref 0–0.03)
WBC # BLD: 6.7 E9/L (ref 4.5–11.5)

## 2020-08-26 PROCEDURE — 70470 CT HEAD/BRAIN W/O & W/DYE: CPT

## 2020-08-26 PROCEDURE — 80048 BASIC METABOLIC PNL TOTAL CA: CPT

## 2020-08-26 PROCEDURE — 99232 SBSQ HOSP IP/OBS MODERATE 35: CPT | Performed by: STUDENT IN AN ORGANIZED HEALTH CARE EDUCATION/TRAINING PROGRAM

## 2020-08-26 PROCEDURE — 85027 COMPLETE CBC AUTOMATED: CPT

## 2020-08-26 PROCEDURE — 84484 ASSAY OF TROPONIN QUANT: CPT

## 2020-08-26 PROCEDURE — 6360000002 HC RX W HCPCS: Performed by: INTERNAL MEDICINE

## 2020-08-26 PROCEDURE — 83735 ASSAY OF MAGNESIUM: CPT

## 2020-08-26 PROCEDURE — 2580000003 HC RX 258: Performed by: RADIOLOGY

## 2020-08-26 PROCEDURE — 36415 COLL VENOUS BLD VENIPUNCTURE: CPT

## 2020-08-26 PROCEDURE — 6360000004 HC RX CONTRAST MEDICATION: Performed by: RADIOLOGY

## 2020-08-26 PROCEDURE — 2580000003 HC RX 258: Performed by: INTERNAL MEDICINE

## 2020-08-26 PROCEDURE — 2060000000 HC ICU INTERMEDIATE R&B

## 2020-08-26 PROCEDURE — 6370000000 HC RX 637 (ALT 250 FOR IP): Performed by: INTERNAL MEDICINE

## 2020-08-26 PROCEDURE — 93306 TTE W/DOPPLER COMPLETE: CPT

## 2020-08-26 RX ORDER — SODIUM CHLORIDE 0.9 % (FLUSH) 0.9 %
10 SYRINGE (ML) INJECTION ONCE
Status: COMPLETED | OUTPATIENT
Start: 2020-08-26 | End: 2020-08-26

## 2020-08-26 RX ADMIN — Medication 10 ML: at 09:56

## 2020-08-26 RX ADMIN — IOPAMIDOL 90 ML: 755 INJECTION, SOLUTION INTRAVENOUS at 16:40

## 2020-08-26 RX ADMIN — Medication 10 ML: at 19:27

## 2020-08-26 RX ADMIN — METOPROLOL SUCCINATE 50 MG: 50 TABLET, EXTENDED RELEASE ORAL at 09:56

## 2020-08-26 RX ADMIN — ENALAPRIL MALEATE 10 MG: 10 TABLET ORAL at 09:56

## 2020-08-26 RX ADMIN — ACETAMINOPHEN 650 MG: 325 TABLET, FILM COATED ORAL at 02:38

## 2020-08-26 RX ADMIN — Medication 10 ML: at 16:42

## 2020-08-26 RX ADMIN — ATORVASTATIN CALCIUM 80 MG: 80 TABLET, FILM COATED ORAL at 20:51

## 2020-08-26 RX ADMIN — HYDRALAZINE HYDROCHLORIDE 5 MG: 20 INJECTION INTRAMUSCULAR; INTRAVENOUS at 19:27

## 2020-08-26 RX ADMIN — ACETAMINOPHEN 650 MG: 325 TABLET, FILM COATED ORAL at 21:27

## 2020-08-26 ASSESSMENT — PAIN SCALES - GENERAL
PAINLEVEL_OUTOF10: 0
PAINLEVEL_OUTOF10: 3
PAINLEVEL_OUTOF10: 0
PAINLEVEL_OUTOF10: 3
PAINLEVEL_OUTOF10: 0
PAINLEVEL_OUTOF10: 0

## 2020-08-26 NOTE — PROGRESS NOTES
Hospital Medicine Progress Note      Date of Admission: 8/23/2020  Hospital day # 3    Course: Follow-up on elevated troponins, accelerated hypertension, psychosis    Subjective    Stable overnight. No other overnight issues reported. Exam:    BP (!) 142/82   Pulse 90   Temp 98.6 °F (37 °C) (Temporal)   Resp 18   Ht 5' 3\" (1.6 m)   Wt 204 lb (92.5 kg)   SpO2 97%   BMI 36.14 kg/m²     General appearance: No apparent distress, appears stated age and cooperative. HEENT: Pupils equal, round, and reactive to light. Conjunctivae/corneas clear. Neck: Supple. No jugular venous distention. Trachea midline. Respiratory:  Normal respiratory effort. Clear to auscultation, bilaterally without Rales/Wheezes/Rhonchi. Cardiovascular: S1/S2   Abdomen: Soft, non-tender, non-distended with normal bowel sounds. Musculoskeletal: No clubbing, cyanosis or edema bilaterally.    Skin:  No rashes    Neurologic: awake, alert and following commands     Medications:  Reviewed    Infusion Medications   Scheduled Medications    enoxaparin  40 mg Subcutaneous Daily    sodium chloride flush  10 mL Intravenous 2 times per day    atorvastatin  80 mg Oral Nightly    aspirin  81 mg Oral Daily    enalapril  10 mg Oral Daily    metoprolol succinate  50 mg Oral Daily     PRN Meds: sodium chloride flush, acetaminophen **OR** acetaminophen, polyethylene glycol, promethazine **OR** ondansetron, hydrALAZINE    I/O    Intake/Output Summary (Last 24 hours) at 8/26/2020 0845  Last data filed at 8/25/2020 1522  Gross per 24 hour   Intake 120 ml   Output --   Net 120 ml       Labs:   Recent Labs     08/23/20  1138 08/24/20  0424   WBC 9.5 8.8   HGB 14.8 15.1   HCT 45.8 48.4*    239       Recent Labs     08/23/20  1138 08/24/20  0424    140   K 2.9* 4.6    103   CO2 25 21*   BUN 19 16   CREATININE 0.7 0.6   CALCIUM 9.2 9.2       Recent Labs     08/23/20  1138 08/24/20  0424   PROT 7.7 7.7   ALKPHOS 54 49   ALT 27 27   AST 30 38*   BILITOT 1.0 1.1       Recent Labs     08/23/20  1513   INR 1.1       Recent Labs     08/23/20  1623 08/23/20  2224 08/24/20  0424   TROPONINI 0.14* 0.15* 0.10*       Other labs:  Lab Results   Component Value Date    CHOL 182 08/24/2020    TRIG 133 08/24/2020    HDL 44 08/24/2020    LDLCALC 111 (H) 08/24/2020    TSH 1.552 10/12/2010    INR 1.1 08/23/2020    LABA1C 5.6 05/11/2017       Radiology:  Imaging studies reviewed today. 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 Hydralazine IV prn SBP >150  Continue Toprol  Continue Enalapril      Diet: DIET CARDIAC; No Caffeine  Code Status: Full Code    PT/OT Eval Status: [] Ordered [] Evaluation noted [x] Not applicable    DVT Prophylaxis: []Lovenox []Heparin [x]PCD [] [address] []Encouraged ambulation []N/A    Likely disposition when able:  []Home [] Home with [facility] [] SNF/LOLLY [] Acute Rehab [] LTAC [x]Other    +++++++++++++++++++++++++++++++++++++++++++++++++  Seda Garcia DO, Hospitalist  +++++++++++++++++++++++++++++++++++++++++++++++++  NOTE: This report was transcribed using voice recognition software.  Every effort was made to ensure accuracy; however, inadvertent computerized transcription errors may be present.

## 2020-08-26 NOTE — PROCEDURES
8/26/20 1:30 pm - Spoke to Gabriel and she spoke to pt's  and he states Dr Britton Ramsay placed aneurysm clips and surgery was done here. Will look for operative report in pt's chart to confirm this.

## 2020-08-26 NOTE — PROGRESS NOTES
INPATIENT CARDIOLOGY FOLLOW-UP    Name: Kassandra Wynn    Age: 56 y.o. Date of Admission: 8/23/2020 11:25 AM    Date of Service: 8/26/2020    Interim History:  No cardiac symptoms. Found to have space occupying meningioma.     Review of Systems:   Cardiac: As per HPI  General: No fever, chills  Pulmonary: As per HPI  HEENT: No visual disturbances, difficult swallowing  GI: No nausea, vomiting  Endocrine: No thyroid disease or DM  Musculoskeletal: NESS x 4, no focal motor deficits  Skin: Intact, no rashes  Neuro/Psych: No headache or seizures    Problem List:  Patient Active Problem List   Diagnosis    HTN (hypertension)    Non-ST elevation MI (NSTEMI) [facility] [address] [facility]    [facility]STEM[facility]non-ST elevated [facility] infarction) ([facility]    Elevated troponin       Allergies:  No Known Allergies    Current Medications:  Current Facility-Administered Medications   Medication Dose Route Frequency Provider Last Rate Last Dose    enoxaparin (LOVENOX) injection 40 mg  40 mg Subcutaneous Daily JennyREAGAN Pruett - CNP   40 mg at 08/25/20 8489    sodium chloride flush 0.9 % injection 10 mL  10 mL Intravenous 2 times per day Teetee Busby MD   10 mL at 08/26/20 6495    sodium chloride flush 0.9 % injection 10 mL  10 mL Intravenous PRN Teetee Busby MD   10 mL at 08/24/20 2258    acetaminophen (TYLENOL) tablet 650 mg  650 mg Oral Q6H PRN Teetee Busby MD   650 mg at 08/26/20 4773    Or    acetaminophen (TYLENOL) suppository 650 mg  650 mg Rectal Q6H PRN Teetee Busby MD        polyethylene glycol (GLYCOLAX) packet 17 g  17 g Oral Daily PRN Teetee Busby MD        promethazine (PHENERGAN) tablet 12.5 mg  12.5 mg Oral Q6H PRN Teetee Busby MD        Or    ondansetron (ZOFRAN) injection 4 mg  4 mg Intravenous Q6H PRN Teetee Busby MD        atorvastatin (LIPITOR) tablet 80 mg  80 mg Oral Nightly Teetee Busby MD   80 mg at 08/25/20 1954    aspirin chewable tablet 81 mg  81 mg Oral Daily Сергей GUTHRIE MD Ethel   Stopped at 08/26/20 0945    enalapril (VASOTEC) tablet 10 mg  10 mg Oral Daily Janny Goldberg MD   10 mg at 08/26/20 0956    metoprolol succinate (TOPROL XL) extended release tablet 50 mg  50 mg Oral Daily Janny Goldberg MD   50 mg at 08/26/20 0956    hydrALAZINE (APRESOLINE) injection 5 mg  5 mg Intravenous Q6H PRN Janny Goldberg MD   5 mg at 08/24/20 1063         Physical Exam:  BP (!) 160/89   Pulse 75   Temp 97.2 °F (36.2 °C) (Temporal)   Resp 18   Ht 5' 3\" (1.6 m)   Wt 204 lb (92.5 kg)   SpO2 96%   BMI 36.14 kg/m²   Wt Readings from Last 3 Encounters:   08/26/20 204 lb (92.5 kg)   01/24/17 219 lb (99.3 kg)   01/05/15 210 lb (95.3 kg)     Appearance: Awake, alert, no acute respiratory distress  Skin: Intact, no rash  Head: Normocephalic, atraumatic  Neck: Supple, no elevated JVP, no carotid bruits  Lungs: Clear to auscultation bilaterally. No wheezes, rales, or rhonchi. Cardiac: Regular rate and rhythm, +S1S2, no murmurs apparent  Abdomen: Soft, nontender, +bowel sounds  Extremities: Moves all extremities x 4, no lower extremity edema  Neurologic: No focal motor deficits apparent, normal mood and affect  Peripheral Pulses: Intact posterior tibial pulses bilaterally    Intake/Output:    Intake/Output Summary (Last 24 hours) at 8/26/2020 1256  Last data filed at 8/25/2020 1522  Gross per 24 hour   Intake 120 ml   Output --   Net 120 ml     No intake/output data recorded.     Laboratory Tests:  Recent Labs     08/24/20  0424 08/26/20  1007    143   K 4.6 3.7    103   CO2 21* 22   BUN 16 15   CREATININE 0.6 0.7   GLUCOSE 93 146*   CALCIUM 9.2 9.2     Lab Results   Component Value Date    MG 2.0 08/26/2020     Recent Labs     08/24/20  0424   ALKPHOS 49   ALT 27   AST 38*   PROT 7.7   BILITOT 1.1   BILIDIR <0.2   LABALBU 3.8     Recent Labs     08/24/20  0424 08/26/20  1007   WBC 8.8 6.7   RBC 5.19 5.27   HGB 15.1 15.1   HCT 48.4* 47.1   MCV 93.3 89.4   MCH 29.1 28.7 BP.  No need for further cardiac work up prior to her neurosurgery.     Talia Mae MD  [facility] ([facility]) Cardiology

## 2020-08-27 ENCOUNTER — ANESTHESIA EVENT (OUTPATIENT)
Dept: OPERATING ROOM | Age: 60
DRG: 025 | End: 2020-08-27
Payer: COMMERCIAL

## 2020-08-27 LAB
ANION GAP SERPL CALCULATED.3IONS-SCNC: 13 MMOL/L (ref 7–16)
BUN BLDV-MCNC: 16 MG/DL (ref 8–23)
CALCIUM SERPL-MCNC: 9.2 MG/DL (ref 8.6–10.2)
CHLORIDE BLD-SCNC: 99 MMOL/L (ref 98–107)
CO2: 25 MMOL/L (ref 22–29)
CREAT SERPL-MCNC: 0.8 MG/DL (ref 0.5–1)
GFR AFRICAN AMERICAN: >60
GFR NON-AFRICAN AMERICAN: >60 ML/MIN/1.73
GLUCOSE BLD-MCNC: 116 MG/DL (ref 74–99)
HCT VFR BLD CALC: 44.1 % (ref 34–48)
HEMOGLOBIN: 14.6 G/DL (ref 11.5–15.5)
MAGNESIUM: 1.9 MG/DL (ref 1.6–2.6)
MCH RBC QN AUTO: 28.9 PG (ref 26–35)
MCHC RBC AUTO-ENTMCNC: 33.1 % (ref 32–34.5)
MCV RBC AUTO: 87.2 FL (ref 80–99.9)
PDW BLD-RTO: 13.5 FL (ref 11.5–15)
PLATELET # BLD: 266 E9/L (ref 130–450)
PMV BLD AUTO: 10.9 FL (ref 7–12)
POTASSIUM SERPL-SCNC: 3.6 MMOL/L (ref 3.5–5)
RBC # BLD: 5.06 E12/L (ref 3.5–5.5)
SODIUM BLD-SCNC: 137 MMOL/L (ref 132–146)
WBC # BLD: 9.2 E9/L (ref 4.5–11.5)

## 2020-08-27 PROCEDURE — 83735 ASSAY OF MAGNESIUM: CPT

## 2020-08-27 PROCEDURE — 36415 COLL VENOUS BLD VENIPUNCTURE: CPT

## 2020-08-27 PROCEDURE — 6360000002 HC RX W HCPCS: Performed by: NEUROLOGICAL SURGERY

## 2020-08-27 PROCEDURE — 6370000000 HC RX 637 (ALT 250 FOR IP): Performed by: FAMILY MEDICINE

## 2020-08-27 PROCEDURE — 6370000000 HC RX 637 (ALT 250 FOR IP): Performed by: INTERNAL MEDICINE

## 2020-08-27 PROCEDURE — 2580000003 HC RX 258: Performed by: INTERNAL MEDICINE

## 2020-08-27 PROCEDURE — 2060000000 HC ICU INTERMEDIATE R&B

## 2020-08-27 PROCEDURE — 85027 COMPLETE CBC AUTOMATED: CPT

## 2020-08-27 PROCEDURE — 80048 BASIC METABOLIC PNL TOTAL CA: CPT

## 2020-08-27 PROCEDURE — 6360000002 HC RX W HCPCS: Performed by: INTERNAL MEDICINE

## 2020-08-27 RX ORDER — DEXAMETHASONE SODIUM PHOSPHATE 4 MG/ML
6 INJECTION, SOLUTION INTRA-ARTICULAR; INTRALESIONAL; INTRAMUSCULAR; INTRAVENOUS; SOFT TISSUE EVERY 6 HOURS
Status: DISCONTINUED | OUTPATIENT
Start: 2020-08-27 | End: 2020-08-27

## 2020-08-27 RX ORDER — LEVETIRACETAM 500 MG/1
500 TABLET ORAL 2 TIMES DAILY
Status: DISCONTINUED | OUTPATIENT
Start: 2020-08-27 | End: 2020-08-28

## 2020-08-27 RX ORDER — DEXAMETHASONE SODIUM PHOSPHATE 4 MG/ML
4 INJECTION, SOLUTION INTRA-ARTICULAR; INTRALESIONAL; INTRAMUSCULAR; INTRAVENOUS; SOFT TISSUE EVERY 6 HOURS
Status: DISCONTINUED | OUTPATIENT
Start: 2020-08-27 | End: 2020-08-28

## 2020-08-27 RX ORDER — LEVETIRACETAM 500 MG/1
500 TABLET ORAL 2 TIMES DAILY
Status: DISCONTINUED | OUTPATIENT
Start: 2020-08-27 | End: 2020-08-27 | Stop reason: SDUPTHER

## 2020-08-27 RX ADMIN — Medication 10 ML: at 20:06

## 2020-08-27 RX ADMIN — LEVETIRACETAM 500 MG: 500 TABLET ORAL at 20:05

## 2020-08-27 RX ADMIN — Medication 10 ML: at 09:58

## 2020-08-27 RX ADMIN — DEXAMETHASONE SODIUM PHOSPHATE 4 MG: 4 INJECTION, SOLUTION INTRAMUSCULAR; INTRAVENOUS at 14:19

## 2020-08-27 RX ADMIN — ENALAPRIL MALEATE 10 MG: 10 TABLET ORAL at 09:57

## 2020-08-27 RX ADMIN — DEXAMETHASONE SODIUM PHOSPHATE 4 MG: 4 INJECTION, SOLUTION INTRAMUSCULAR; INTRAVENOUS at 18:52

## 2020-08-27 RX ADMIN — METOPROLOL SUCCINATE 50 MG: 50 TABLET, EXTENDED RELEASE ORAL at 09:57

## 2020-08-27 RX ADMIN — LEVETIRACETAM 500 MG: 500 TABLET ORAL at 09:57

## 2020-08-27 RX ADMIN — HYDRALAZINE HYDROCHLORIDE 5 MG: 20 INJECTION INTRAMUSCULAR; INTRAVENOUS at 23:16

## 2020-08-27 RX ADMIN — ATORVASTATIN CALCIUM 80 MG: 80 TABLET, FILM COATED ORAL at 20:05

## 2020-08-27 ASSESSMENT — PAIN SCALES - GENERAL
PAINLEVEL_OUTOF10: 0

## 2020-08-27 ASSESSMENT — LIFESTYLE VARIABLES: SMOKING_STATUS: 0

## 2020-08-27 NOTE — CARE COORDINATION
SW received call from sister in law, Mattie Fuchs [phone] who was inquiring about getting patient help with her divorce from her . She states she and other siblings are trying to find an  that can help with her divorce, should her  decide he wants one (no divorce papers have been served yet). AIMEE informed her that would be on their own accord, we do not have recommendations for 's. She states they have many concerns, patient is a hoarder, they don't feel she can care for herself and they are hoping she can go to rehab at discharge. AIMEE informed her that because  is POA all arrangements will be made with him, and no other information can be given. She states understanding.

## 2020-08-27 NOTE — PROGRESS NOTES
Physician Progress Note      Wilda MONCADA #:                  [id number]  :                       1960  ADMIT DATE:       2020 11:25 AM  DISCH DATE:  RESPONDING  PROVIDER #:        Peggy Villa MD          QUERY TEXT:    Dear Dr. Shira Torres,    Pt admitted with Acute psychosis. Pt noted to have para sagittal meningioma   causing mass effect and vasogenic edema in the (L) occipital lobe. If   clinically significant, please document in progress notes and discharge   summary if you are evaluating/treating any of the following: The medical record reflects the following:  Risk Factors: Know medical history of meningioma  Clinical Indicators: Ct scan showing progression in size of posterior left   para sagittal meningioma causing significant mass effect in the left occipital   lobe, depressing left-sided tentorium inferiorly and causing vasogenic edema   in the left occipital lobe parenchyma, pt is experiencing psychotic episode,   confusion  Treatment: Admission to telemetry, Neurosurgery consult, CT scan x2 to monitor   progression, close patient monitoring    Thank you,  Eleanor WARE, RN  Clinical Documentation Improvement  [email or web address]  [phone] (personal cell)  Options provided:  -- Cerebral edema and Brain compression  -- Cerebral edema  -- Brain compression  -- Other - I will add my own diagnosis  -- Disagree - Not applicable / Not valid  -- Disagree - Clinically unable to determine / Unknown  -- Refer to Clinical Documentation Reviewer    PROVIDER RESPONSE TEXT:    This patient has cerebral edema and brain compression.     Query created by: Dafne Madrigal on 2020 1:49 PM      Electronically signed by:  Peggy Villa MD 2020 2:21 PM

## 2020-08-27 NOTE — CONSULTS
ideation. RECOMMENDATIONS:  Discussed the status of the patient's pathology and  symptoms with the family as the patient is confused and agitated and  somewhat has abusive language. It is hard to discuss any issues with the patient. As the patient has  problem with her , she does want to discuss issues with her  sister. We will await any discussion with the family prior to making  any decision in that regard. The patient was started on anticonvulsant,  Keppra. We will follow along.         Faiza Guevara MD    D: 08/26/2020 18:17:16       T: 08/26/2020 18:25:34     [email or web address]  Job#: [id number]     Doc#: [id number]    CC:

## 2020-08-27 NOTE — ANESTHESIA PRE PROCEDURE
results found for: HIV, HEPCAB    COVID-19 Screening (If Applicable): No results found for: COVID19    CT HEAD 8/26/2020  FINDINGS: The left occipital lesion enhances intensely no homogeneous    enhancement compatible with a meningioma causing was origin edema of    the underlying brain parenchyma compressing the region of the left    lateral ventricle.         No other enhancing extra-axial lesion is seen.         Old infarct with encephalomalacia in the right cerebral hemisphere as    discussed previously.         Images were obtained with Bravo protocol for preoperatory evaluation.         There is a aneurysm of the tip of the basilar artery measuring    approximately 5.4 mm are.              Impression    1. CT scan of the brain bravo thickening for were no indication.         2. Large meningioma for the left occipital region as described on    previous study.         3. 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       CXR 8/23/2020  Findings:         The lungs are clear.  There is no evidence of pulmonary infiltrate or    pleural effusion.  The pulmonary vascularity is unremarkable.  The    cardiac, hilar and mediastinal silhouettes are satisfactory.  The bony    thorax demonstrates no gross abnormality.                   Impression         NO ACUTE CARDIOPULMONARY PROCESS      EKG 8/24/2020  Component  Value  Ref Range & Units  Status  Collected  Lab    Ventricular Rate  63  BPM  Final  08/24/2020  8:10 AM  [facility]    Atrial Rate  63  BPM  Final  08/24/2020  8:10 AM  [facility]    P-R Interval  200  ms  Final  08/24/2020  8:10 AM  [facility]    QRS Duration  70  ms  Final  08/24/2020  8:10 AM  [facility]    Q-T Interval  442  ms  Final  08/24/2020  8:10 AM  [facility]    QTc Calculation (Bazett)  452  ms  Final  08/24/2020  8:10 AM  [facility]    P Axis  40  degrees  Final  08/24/2020  8:10 AM  [facility]    T Axis  21  degrees  Final  08/24/2020  8:10 AM  [facility]    Testing Performed By     Lab - [address]  Name  Director  Address  Valid Date Range    538-[facility]  [facility] MUSE  Unknown  Unknown  04/18/16 0721-Present    Narrative & Impression     Normal sinus rhythm  Anteroseptal infarct (cited on or before 23-AUG-2020)  Abnormal ECG  When compared with ECG of 23-AUG-2020 12:00,  No significant change was found     ECHO 8/26/2020   Findings      Left Ventricle   Left ventricle hypertrophy. Normal left ventricular systolic function. Visually estimated LVEF is 60-65 %. No wall motion abnormalities. Right Ventricle   Normal right ventricle structure and function. Left Atrium   Normal left atrial size. Right Atrium   Normal right atrium size. Mitral Valve   Mitral annular calcification. No mitral valve regurgitation. Tricuspid Valve   Normal tricuspid valve structure and function. Trace tricuspid valve regurgitation. Aortic Valve   Aortic valve sclerosis. Mild aortic regurgitation. Pulmonic Valve   Normal pulmonic valve structure and function. Trace pulmonic valve regurgitation. Pericardial Effusion   No pericardial effusion. Conclusions      Summary   Left ventricle hypertrophy. Normal left ventricular systolic function. Visually estimated LVEF is 60-65 %. No wall motion abnormalities. Normal right ventricle structure and function. No significant valvular abnormalities. No comparison study available.       Anesthesia Evaluation  Patient summary reviewed and Nursing notes reviewed no history of anesthetic complications:   Airway: Mallampati: II  TM distance: >3 FB   Neck ROM: full  Mouth opening: > = 3 FB Dental:      Comment: Poor dentition    Pulmonary:Negative Pulmonary ROS breath sounds clear to auscultation      (-) not a current smoker                           Cardiovascular:    (+) hypertension:, past MI:, hyperlipidemia      ECG reviewed  Rhythm: regular  Rate: normal  Echocardiogram reviewed         Beta Blocker:  Dose within 24 occipital region as described on    previous study. 3. 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 Order History     Open Order Details    PACS Images      Show images for CT HEAD W WO CONTRAST          Anesthesia Plan      general     ASA 3       Induction: intravenous. arterial line and BIS  MIPS: Postoperative opioids intended, Prophylactic antiemetics administered and Postoperative trial extubation. Anesthetic plan and risks discussed with patient. Use of blood products discussed with patient whom consented to blood products. Plan discussed with CRNA and attending. Danielle Gerber RN   8/27/2020    Patient seen and examined, chart reviewed, agree with above findings. Anesthetic plan, risks, benefits, alternatives, and personnel involved discussed with patient. Patient verbalized an understanding and agreed to proceed. NPO status confirmed. Anesthetic plan discussed with care team members and agreed upon.     Arabella Pearson DO   8/28/2020  8:45 AM

## 2020-08-27 NOTE — PROGRESS NOTES
Hospital Medicine Progress Note      Date of Admission: 8/23/2020  Hospital day # 4    Elevated troponin likely due to demand ischemia secondary to elevated blood pressure. 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 Psychosis    Subjective    Overnight, patient not wanting to see a neurosurgeon. Confused. Exam:    /71   Pulse 75   Temp 96.8 °F (36 °C) (Temporal)   Resp 20   Ht 5' 3\" (1.6 m)   Wt 207 lb 14.4 oz (94.3 kg)   SpO2 97%   BMI 36.83 kg/m²     General appearance: No apparent distress, appears stated age and cooperative. HEENT: Pupils equal, round, and reactive to light. Conjunctivae/corneas clear. Neck: Supple. No jugular venous distention. Trachea midline. Respiratory:  Normal respiratory effort. Clear to auscultation, bilaterally without Rales/Wheezes/Rhonchi. Cardiovascular: S1/S2   Abdomen: Soft, non-tender, non-distended with normal bowel sounds. Musculoskeletal: No clubbing, cyanosis or edema bilaterally.    Skin:  No rashes    Neurologic: awake, alert and following commands     Medications:  Reviewed    Infusion Medications   Scheduled Medications    enoxaparin  40 mg Subcutaneous Daily    sodium chloride flush  10 mL Intravenous 2 times per day    atorvastatin  80 mg Oral Nightly    aspirin  81 mg Oral Daily    enalapril  10 mg Oral Daily    metoprolol succinate  50 mg Oral Daily     PRN Meds: sodium chloride flush, acetaminophen **OR** acetaminophen, polyethylene glycol, promethazine **OR** ondansetron, hydrALAZINE    I/O    Intake/Output Summary (Last 24 hours) at 8/27/2020 0925  Last data filed at 8/26/2020 1515  Gross per 24 hour   Intake 360 ml   Output --   Net 360 ml       Labs:   Recent Labs     08/26/20  1007   WBC 6.7   HGB 15.1   HCT 47.1          Recent Labs     08/26/20  1007      K 3.7      CO2 22   BUN 15   CREATININE 0.7   CALCIUM 9.2       No results for input(s): PROT, ALB, ALKPHOS, ALT, AST, BILITOT, AMYLASE, LIPASE in the last 72 hours. No results for input(s): INR in the last 72 hours. 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  Elevated troponins  Psychosis  Hypertension  History of brain aneurysm       PLAN:  Neurosurgery following  Keppra 5 mg twice daily  Cardiology following  Hydralazine IV prn SBP >150  Continue Toprol  Continue Enalapril      Diet: DIET CARDIAC; No Caffeine  Code Status: Full Code    PT/OT Eval Status: [] Ordered [] Evaluation noted [x] Not applicable    DVT Prophylaxis: []Lovenox []Heparin [x]PCD [] [address] []Encouraged ambulation []N/A    Likely disposition when able:  []Home [] Home with [facility] [] SNF/LOLLY [] Acute Rehab [] LTAC [x]Other    +++++++++++++++++++++++++++++++++++++++++++++++++  Wanda Plant, DO, Hospitalist  +++++++++++++++++++++++++++++++++++++++++++++++++  NOTE: This report was transcribed using voice recognition software.  Every effort was made to ensure accuracy; however, inadvertent computerized transcription errors may be present.

## 2020-08-28 ENCOUNTER — APPOINTMENT (OUTPATIENT)
Dept: GENERAL RADIOLOGY | Age: 60
DRG: 025 | End: 2020-08-28
Payer: COMMERCIAL

## 2020-08-28 ENCOUNTER — ANESTHESIA (OUTPATIENT)
Dept: OPERATING ROOM | Age: 60
DRG: 025 | End: 2020-08-28
Payer: COMMERCIAL

## 2020-08-28 VITALS
RESPIRATION RATE: 1 BRPM | TEMPERATURE: 98.8 F | OXYGEN SATURATION: 94 % | DIASTOLIC BLOOD PRESSURE: 76 MMHG | SYSTOLIC BLOOD PRESSURE: 119 MMHG

## 2020-08-28 LAB
ABO/RH: NORMAL
ANION GAP SERPL CALCULATED.3IONS-SCNC: 14 MMOL/L (ref 7–16)
ANTIBODY SCREEN: NORMAL
B.E.: -2.1 MMOL/L (ref -3–0)
B.E.: -3.3 MMOL/L (ref -3–0)
B.E.: -3.4 MMOL/L (ref -3–0)
BUN BLDV-MCNC: 17 MG/DL (ref 8–23)
CALCIUM SERPL-MCNC: 9.6 MG/DL (ref 8.6–10.2)
CARDIOPULMONARY BYPASS: NO
CHLORIDE BLD-SCNC: 104 MMOL/L (ref 98–107)
CO2: 23 MMOL/L (ref 22–29)
CREAT SERPL-MCNC: 0.7 MG/DL (ref 0.5–1)
DEVICE: ABNORMAL
FIO2 ARTERIAL: 60
GFR AFRICAN AMERICAN: >60
GFR NON-AFRICAN AMERICAN: >60 ML/MIN/1.73
GLUCOSE BLD-MCNC: 145 MG/DL (ref 74–99)
HCO3 ARTERIAL: 21.9 MMOL/L (ref 22–26)
HCO3 ARTERIAL: 23 MMOL/L (ref 22–26)
HCO3 ARTERIAL: 24.3 MMOL/L (ref 22–26)
HCT (EST): 37 % (ref 34–48)
HCT (EST): 37 % (ref 34–48)
HCT (EST): 41 % (ref 34–48)
HCT VFR BLD CALC: 37 % (ref 34–48)
HCT VFR BLD CALC: 45.5 % (ref 34–48)
HEMOGLOBIN: 12.1 G/DL (ref 11.5–15.5)
HEMOGLOBIN: 14.5 G/DL (ref 11.5–15.5)
HGB, (EST): 12.5 G/DL (ref 11.5–15.5)
HGB, (EST): 12.6 G/DL (ref 11.5–15.5)
HGB, (EST): 13.9 G/DL (ref 11.5–15.5)
MAGNESIUM: 2.2 MG/DL (ref 1.6–2.6)
MCH RBC QN AUTO: 28.4 PG (ref 26–35)
MCHC RBC AUTO-ENTMCNC: 31.9 % (ref 32–34.5)
MCV RBC AUTO: 89.2 FL (ref 80–99.9)
O2 SATURATION: 100 % (ref 92–98.5)
O2 SATURATION: 94.6 % (ref 92–98.5)
O2 SATURATION: 99.1 % (ref 92–98.5)
OPERATOR ID: ABNORMAL
PCO2 ARTERIAL: 39.3 MMHG (ref 35–45)
PCO2 ARTERIAL: 45.7 MMHG (ref 35–45)
PCO2 ARTERIAL: 47.1 MMHG (ref 35–45)
PDW BLD-RTO: 13.2 FL (ref 11.5–15)
PH BLOOD GAS: 7.31 (ref 7.35–7.45)
PH BLOOD GAS: 7.32 (ref 7.35–7.45)
PH BLOOD GAS: 7.36 (ref 7.35–7.45)
PLATELET # BLD: 299 E9/L (ref 130–450)
PMV BLD AUTO: 11.8 FL (ref 7–12)
PO2 ARTERIAL: 146.2 MMHG (ref 60–80)
PO2 ARTERIAL: 453.4 MMHG (ref 60–80)
PO2 ARTERIAL: 76.8 MMHG (ref 60–80)
POTASSIUM SERPL-SCNC: 3.6 MMOL/L (ref 3.5–5.5)
POTASSIUM SERPL-SCNC: 4.1 MMOL/L (ref 3.5–5.5)
POTASSIUM SERPL-SCNC: 4.2 MMOL/L (ref 3.5–5)
POTASSIUM SERPL-SCNC: 4.3 MMOL/L (ref 3.5–5.5)
RBC # BLD: 5.1 E12/L (ref 3.5–5.5)
SODIUM BLD-SCNC: 141 MMOL/L (ref 132–146)
SOURCE, BLOOD GAS: ABNORMAL
WBC # BLD: 10.9 E9/L (ref 4.5–11.5)

## 2020-08-28 PROCEDURE — 2720000010 HC SURG SUPPLY STERILE: Performed by: NEUROLOGICAL SURGERY

## 2020-08-28 PROCEDURE — 36430 TRANSFUSION BLD/BLD COMPNT: CPT

## 2020-08-28 PROCEDURE — 6370000000 HC RX 637 (ALT 250 FOR IP): Performed by: NEUROLOGICAL SURGERY

## 2020-08-28 PROCEDURE — 6360000002 HC RX W HCPCS: Performed by: NEUROLOGICAL SURGERY

## 2020-08-28 PROCEDURE — 2580000003 HC RX 258: Performed by: NEUROLOGICAL SURGERY

## 2020-08-28 PROCEDURE — 36415 COLL VENOUS BLD VENIPUNCTURE: CPT

## 2020-08-28 PROCEDURE — 86901 BLOOD TYPING SEROLOGIC RH(D): CPT

## 2020-08-28 PROCEDURE — 2500000003 HC RX 250 WO HCPCS: Performed by: NEUROLOGICAL SURGERY

## 2020-08-28 PROCEDURE — 6370000000 HC RX 637 (ALT 250 FOR IP): Performed by: FAMILY MEDICINE

## 2020-08-28 PROCEDURE — 2000000000 HC ICU R&B

## 2020-08-28 PROCEDURE — 85018 HEMOGLOBIN: CPT

## 2020-08-28 PROCEDURE — 00B10ZX EXCISION OF CEREBRAL MENINGES, OPEN APPROACH, DIAGNOSTIC: ICD-10-PCS | Performed by: NEUROLOGICAL SURGERY

## 2020-08-28 PROCEDURE — 2580000003 HC RX 258: Performed by: NURSE ANESTHETIST, CERTIFIED REGISTERED

## 2020-08-28 PROCEDURE — 86900 BLOOD TYPING SEROLOGIC ABO: CPT

## 2020-08-28 PROCEDURE — 86923 COMPATIBILITY TEST ELECTRIC: CPT

## 2020-08-28 PROCEDURE — 3700000000 HC ANESTHESIA ATTENDED CARE: Performed by: NEUROLOGICAL SURGERY

## 2020-08-28 PROCEDURE — 3600000005 HC SURGERY LEVEL 5 BASE: Performed by: NEUROLOGICAL SURGERY

## 2020-08-28 PROCEDURE — 6360000002 HC RX W HCPCS: Performed by: REGISTERED NURSE

## 2020-08-28 PROCEDURE — 85014 HEMATOCRIT: CPT

## 2020-08-28 PROCEDURE — 02HV33Z INSERTION OF INFUSION DEVICE INTO SUPERIOR VENA CAVA, PERCUTANEOUS APPROACH: ICD-10-PCS | Performed by: SURGERY

## 2020-08-28 PROCEDURE — P9016 RBC LEUKOCYTES REDUCED: HCPCS

## 2020-08-28 PROCEDURE — 85027 COMPLETE CBC AUTOMATED: CPT

## 2020-08-28 PROCEDURE — 88331 PATH CONSLTJ SURG 1 BLK 1SPC: CPT

## 2020-08-28 PROCEDURE — 6360000002 HC RX W HCPCS: Performed by: NURSE ANESTHETIST, CERTIFIED REGISTERED

## 2020-08-28 PROCEDURE — 82803 BLOOD GASES ANY COMBINATION: CPT

## 2020-08-28 PROCEDURE — 83735 ASSAY OF MAGNESIUM: CPT

## 2020-08-28 PROCEDURE — 88307 TISSUE EXAM BY PATHOLOGIST: CPT

## 2020-08-28 PROCEDURE — 80048 BASIC METABOLIC PNL TOTAL CA: CPT

## 2020-08-28 PROCEDURE — 2500000003 HC RX 250 WO HCPCS: Performed by: NURSE ANESTHETIST, CERTIFIED REGISTERED

## 2020-08-28 PROCEDURE — 2580000003 HC RX 258: Performed by: STUDENT IN AN ORGANIZED HEALTH CARE EDUCATION/TRAINING PROGRAM

## 2020-08-28 PROCEDURE — 3700000001 HC ADD 15 MINUTES (ANESTHESIA): Performed by: NEUROLOGICAL SURGERY

## 2020-08-28 PROCEDURE — 3600000015 HC SURGERY LEVEL 5 ADDTL 15MIN: Performed by: NEUROLOGICAL SURGERY

## 2020-08-28 PROCEDURE — 86850 RBC ANTIBODY SCREEN: CPT

## 2020-08-28 PROCEDURE — 88304 TISSUE EXAM BY PATHOLOGIST: CPT

## 2020-08-28 PROCEDURE — C1713 ANCHOR/SCREW BN/BN,TIS/BN: HCPCS | Performed by: NEUROLOGICAL SURGERY

## 2020-08-28 PROCEDURE — 70450 CT HEAD/BRAIN W/O DYE: CPT

## 2020-08-28 PROCEDURE — 6370000000 HC RX 637 (ALT 250 FOR IP): Performed by: INTERNAL MEDICINE

## 2020-08-28 PROCEDURE — 0HB0XZZ EXCISION OF SCALP SKIN, EXTERNAL APPROACH: ICD-10-PCS | Performed by: NEUROLOGICAL SURGERY

## 2020-08-28 PROCEDURE — 2500000003 HC RX 250 WO HCPCS: Performed by: REGISTERED NURSE

## 2020-08-28 PROCEDURE — 2709999900 HC NON-CHARGEABLE SUPPLY: Performed by: NEUROLOGICAL SURGERY

## 2020-08-28 DEVICE — PLATE, RIGID
Type: IMPLANTABLE DEVICE | Site: CRANIAL | Status: FUNCTIONAL
Brand: UNIVERSAL NEURO 2, QUIKFLAP

## 2020-08-28 DEVICE — SCREW, AXS, SELF-TAPPING
Type: IMPLANTABLE DEVICE | Site: CRANIAL | Status: FUNCTIONAL
Brand: UNIVERSAL NEURO 3

## 2020-08-28 RX ORDER — PHENYLEPHRINE HCL IN 0.9% NACL 1 MG/10 ML
SYRINGE (ML) INTRAVENOUS PRN
Status: DISCONTINUED | OUTPATIENT
Start: 2020-08-28 | End: 2020-08-28 | Stop reason: SDUPTHER

## 2020-08-28 RX ORDER — 0.9 % SODIUM CHLORIDE 0.9 %
500 INTRAVENOUS SOLUTION INTRAVENOUS ONCE
Status: COMPLETED | OUTPATIENT
Start: 2020-08-28 | End: 2020-08-28

## 2020-08-28 RX ORDER — GLYCOPYRROLATE 1 MG/5 ML
SYRINGE (ML) INTRAVENOUS PRN
Status: DISCONTINUED | OUTPATIENT
Start: 2020-08-28 | End: 2020-08-28 | Stop reason: SDUPTHER

## 2020-08-28 RX ORDER — SODIUM CHLORIDE, SODIUM LACTATE, POTASSIUM CHLORIDE, CALCIUM CHLORIDE 600; 310; 30; 20 MG/100ML; MG/100ML; MG/100ML; MG/100ML
INJECTION, SOLUTION INTRAVENOUS CONTINUOUS PRN
Status: COMPLETED | OUTPATIENT
Start: 2020-08-28 | End: 2020-08-28

## 2020-08-28 RX ORDER — PROMETHAZINE HYDROCHLORIDE 25 MG/ML
6.25 INJECTION, SOLUTION INTRAMUSCULAR; INTRAVENOUS
Status: DISCONTINUED | OUTPATIENT
Start: 2020-08-28 | End: 2020-08-28 | Stop reason: HOSPADM

## 2020-08-28 RX ORDER — FENTANYL CITRATE 50 UG/ML
INJECTION, SOLUTION INTRAMUSCULAR; INTRAVENOUS PRN
Status: DISCONTINUED | OUTPATIENT
Start: 2020-08-28 | End: 2020-08-28 | Stop reason: SDUPTHER

## 2020-08-28 RX ORDER — ROCURONIUM BROMIDE 10 MG/ML
INJECTION, SOLUTION INTRAVENOUS PRN
Status: DISCONTINUED | OUTPATIENT
Start: 2020-08-28 | End: 2020-08-28 | Stop reason: SDUPTHER

## 2020-08-28 RX ORDER — ONDANSETRON 2 MG/ML
4 INJECTION INTRAMUSCULAR; INTRAVENOUS
Status: DISCONTINUED | OUTPATIENT
Start: 2020-08-28 | End: 2020-08-28 | Stop reason: HOSPADM

## 2020-08-28 RX ORDER — CEFAZOLIN SODIUM 2 G/50ML
2 SOLUTION INTRAVENOUS
Status: DISCONTINUED | OUTPATIENT
Start: 2020-08-28 | End: 2020-08-28

## 2020-08-28 RX ORDER — PROPOFOL 10 MG/ML
INJECTION, EMULSION INTRAVENOUS PRN
Status: DISCONTINUED | OUTPATIENT
Start: 2020-08-28 | End: 2020-08-28 | Stop reason: SDUPTHER

## 2020-08-28 RX ORDER — MORPHINE SULFATE 2 MG/ML
1 INJECTION, SOLUTION INTRAMUSCULAR; INTRAVENOUS EVERY 5 MIN PRN
Status: DISCONTINUED | OUTPATIENT
Start: 2020-08-28 | End: 2020-08-28 | Stop reason: HOSPADM

## 2020-08-28 RX ORDER — LIDOCAINE HYDROCHLORIDE AND EPINEPHRINE 10; 10 MG/ML; UG/ML
INJECTION, SOLUTION INFILTRATION; PERINEURAL PRN
Status: DISCONTINUED | OUTPATIENT
Start: 2020-08-28 | End: 2020-08-28 | Stop reason: ALTCHOICE

## 2020-08-28 RX ORDER — ONDANSETRON 2 MG/ML
INJECTION INTRAMUSCULAR; INTRAVENOUS PRN
Status: DISCONTINUED | OUTPATIENT
Start: 2020-08-28 | End: 2020-08-28 | Stop reason: SDUPTHER

## 2020-08-28 RX ORDER — LEVETIRACETAM 5 MG/ML
500 INJECTION INTRAVASCULAR EVERY 12 HOURS
Status: DISCONTINUED | OUTPATIENT
Start: 2020-08-28 | End: 2020-08-30

## 2020-08-28 RX ORDER — NEOSTIGMINE METHYLSULFATE 1 MG/ML
INJECTION, SOLUTION INTRAVENOUS PRN
Status: DISCONTINUED | OUTPATIENT
Start: 2020-08-28 | End: 2020-08-28 | Stop reason: SDUPTHER

## 2020-08-28 RX ORDER — DEXTROSE AND SODIUM CHLORIDE 5; .45 G/100ML; G/100ML
INJECTION, SOLUTION INTRAVENOUS CONTINUOUS
Status: DISCONTINUED | OUTPATIENT
Start: 2020-08-28 | End: 2020-08-29

## 2020-08-28 RX ORDER — MORPHINE SULFATE 2 MG/ML
2 INJECTION, SOLUTION INTRAMUSCULAR; INTRAVENOUS EVERY 5 MIN PRN
Status: DISCONTINUED | OUTPATIENT
Start: 2020-08-28 | End: 2020-08-28 | Stop reason: HOSPADM

## 2020-08-28 RX ORDER — FUROSEMIDE 10 MG/ML
INJECTION INTRAMUSCULAR; INTRAVENOUS PRN
Status: DISCONTINUED | OUTPATIENT
Start: 2020-08-28 | End: 2020-08-28 | Stop reason: SDUPTHER

## 2020-08-28 RX ORDER — SODIUM CHLORIDE 9 MG/ML
INJECTION, SOLUTION INTRAVENOUS CONTINUOUS PRN
Status: DISCONTINUED | OUTPATIENT
Start: 2020-08-28 | End: 2020-08-28 | Stop reason: SDUPTHER

## 2020-08-28 RX ORDER — DEXAMETHASONE SODIUM PHOSPHATE 10 MG/ML
INJECTION, SOLUTION INTRAMUSCULAR; INTRAVENOUS PRN
Status: DISCONTINUED | OUTPATIENT
Start: 2020-08-28 | End: 2020-08-28 | Stop reason: SDUPTHER

## 2020-08-28 RX ORDER — 0.9 % SODIUM CHLORIDE 0.9 %
250 INTRAVENOUS SOLUTION INTRAVENOUS ONCE
Status: DISCONTINUED | OUTPATIENT
Start: 2020-08-28 | End: 2020-08-28

## 2020-08-28 RX ORDER — VANCOMYCIN HYDROCHLORIDE 500 MG/10ML
INJECTION, POWDER, LYOPHILIZED, FOR SOLUTION INTRAVENOUS PRN
Status: DISCONTINUED | OUTPATIENT
Start: 2020-08-28 | End: 2020-08-28 | Stop reason: ALTCHOICE

## 2020-08-28 RX ORDER — DIAPER,BRIEF,INFANT-TODD,DISP
EACH MISCELLANEOUS PRN
Status: DISCONTINUED | OUTPATIENT
Start: 2020-08-28 | End: 2020-08-28 | Stop reason: ALTCHOICE

## 2020-08-28 RX ORDER — EPHEDRINE SULFATE/0.9% NACL/PF 50 MG/5 ML
SYRINGE (ML) INTRAVENOUS PRN
Status: DISCONTINUED | OUTPATIENT
Start: 2020-08-28 | End: 2020-08-28 | Stop reason: SDUPTHER

## 2020-08-28 RX ORDER — LIDOCAINE HYDROCHLORIDE 20 MG/ML
INJECTION, SOLUTION INTRAVENOUS PRN
Status: DISCONTINUED | OUTPATIENT
Start: 2020-08-28 | End: 2020-08-28 | Stop reason: SDUPTHER

## 2020-08-28 RX ADMIN — SODIUM CHLORIDE: 9 INJECTION, SOLUTION INTRAVENOUS at 08:56

## 2020-08-28 RX ADMIN — SODIUM CHLORIDE 500 ML: 9 INJECTION, SOLUTION INTRAVENOUS at 15:44

## 2020-08-28 RX ADMIN — Medication 100 MCG: at 09:15

## 2020-08-28 RX ADMIN — PROPOFOL 160 MG: 10 INJECTION, EMULSION INTRAVENOUS at 09:03

## 2020-08-28 RX ADMIN — PHENYLEPHRINE HYDROCHLORIDE 50 MCG/MIN: 10 INJECTION INTRAVENOUS at 09:30

## 2020-08-28 RX ADMIN — ENALAPRIL MALEATE 10 MG: 10 TABLET ORAL at 08:13

## 2020-08-28 RX ADMIN — METOPROLOL SUCCINATE 50 MG: 50 TABLET, EXTENDED RELEASE ORAL at 08:14

## 2020-08-28 RX ADMIN — Medication 2 G: at 13:05

## 2020-08-28 RX ADMIN — HYDROMORPHONE HYDROCHLORIDE 0.5 MG: 1 INJECTION, SOLUTION INTRAMUSCULAR; INTRAVENOUS; SUBCUTANEOUS at 22:34

## 2020-08-28 RX ADMIN — FENTANYL CITRATE 50 MCG: 50 INJECTION, SOLUTION INTRAMUSCULAR; INTRAVENOUS at 10:12

## 2020-08-28 RX ADMIN — DEXAMETHASONE SODIUM PHOSPHATE 4 MG: 4 INJECTION, SOLUTION INTRAMUSCULAR; INTRAVENOUS at 01:04

## 2020-08-28 RX ADMIN — ONDANSETRON HYDROCHLORIDE 4 MG: 2 INJECTION, SOLUTION INTRAMUSCULAR; INTRAVENOUS at 14:05

## 2020-08-28 RX ADMIN — ROCURONIUM BROMIDE 10 MG: 10 INJECTION, SOLUTION INTRAVENOUS at 11:09

## 2020-08-28 RX ADMIN — ROCURONIUM BROMIDE 10 MG: 10 INJECTION, SOLUTION INTRAVENOUS at 11:49

## 2020-08-28 RX ADMIN — DEXAMETHASONE SODIUM PHOSPHATE 10 MG: 10 INJECTION, SOLUTION INTRAMUSCULAR; INTRAVENOUS at 09:03

## 2020-08-28 RX ADMIN — Medication 100 MCG: at 09:07

## 2020-08-28 RX ADMIN — ROCURONIUM BROMIDE 10 MG: 10 INJECTION, SOLUTION INTRAVENOUS at 09:45

## 2020-08-28 RX ADMIN — SODIUM CHLORIDE: 9 INJECTION, SOLUTION INTRAVENOUS at 11:26

## 2020-08-28 RX ADMIN — Medication 2 G: at 09:05

## 2020-08-28 RX ADMIN — FENTANYL CITRATE 50 MCG: 50 INJECTION, SOLUTION INTRAMUSCULAR; INTRAVENOUS at 09:24

## 2020-08-28 RX ADMIN — Medication 100 MCG: at 09:33

## 2020-08-28 RX ADMIN — ROCURONIUM BROMIDE 50 MG: 10 INJECTION, SOLUTION INTRAVENOUS at 09:03

## 2020-08-28 RX ADMIN — FUROSEMIDE 20 MG: 10 INJECTION, SOLUTION INTRAVENOUS at 13:17

## 2020-08-28 RX ADMIN — LEVETIRACETAM 500 MG: 500 TABLET ORAL at 08:14

## 2020-08-28 RX ADMIN — FENTANYL CITRATE 50 MCG: 50 INJECTION, SOLUTION INTRAMUSCULAR; INTRAVENOUS at 12:32

## 2020-08-28 RX ADMIN — ROCURONIUM BROMIDE 10 MG: 10 INJECTION, SOLUTION INTRAVENOUS at 13:01

## 2020-08-28 RX ADMIN — PROPOFOL 10 MG: 10 INJECTION, EMULSION INTRAVENOUS at 11:42

## 2020-08-28 RX ADMIN — FENTANYL CITRATE 100 MCG: 50 INJECTION, SOLUTION INTRAMUSCULAR; INTRAVENOUS at 09:03

## 2020-08-28 RX ADMIN — PROPOFOL 40 MG: 10 INJECTION, EMULSION INTRAVENOUS at 09:23

## 2020-08-28 RX ADMIN — Medication 0.6 MG: at 14:51

## 2020-08-28 RX ADMIN — Medication 3 MG: at 14:51

## 2020-08-28 RX ADMIN — LIDOCAINE HYDROCHLORIDE 60 MG: 20 INJECTION, SOLUTION INTRAVENOUS at 09:03

## 2020-08-28 RX ADMIN — LEVETIRACETAM 500 MG: 5 INJECTION INTRAVENOUS at 18:49

## 2020-08-28 RX ADMIN — VANCOMYCIN HYDROCHLORIDE 1000 MG: 1 INJECTION, POWDER, LYOPHILIZED, FOR SOLUTION INTRAVENOUS at 21:34

## 2020-08-28 RX ADMIN — Medication 5 MG: at 09:30

## 2020-08-28 RX ADMIN — Medication 5 MG: at 09:10

## 2020-08-28 RX ADMIN — ROCURONIUM BROMIDE 10 MG: 10 INJECTION, SOLUTION INTRAVENOUS at 12:23

## 2020-08-28 RX ADMIN — DEXTROSE AND SODIUM CHLORIDE: 5; 450 INJECTION, SOLUTION INTRAVENOUS at 18:42

## 2020-08-28 ASSESSMENT — PULMONARY FUNCTION TESTS
PIF_VALUE: 31
PIF_VALUE: 28
PIF_VALUE: 28
PIF_VALUE: 31
PIF_VALUE: 29
PIF_VALUE: 31
PIF_VALUE: 28
PIF_VALUE: 31
PIF_VALUE: 2
PIF_VALUE: 3
PIF_VALUE: 31
PIF_VALUE: 3
PIF_VALUE: 31
PIF_VALUE: 31
PIF_VALUE: 1
PIF_VALUE: 30
PIF_VALUE: 2
PIF_VALUE: 28
PIF_VALUE: 28
PIF_VALUE: 4
PIF_VALUE: 31
PIF_VALUE: 28
PIF_VALUE: 28
PIF_VALUE: 20
PIF_VALUE: 29
PIF_VALUE: 2
PIF_VALUE: 29
PIF_VALUE: 30
PIF_VALUE: 21
PIF_VALUE: 28
PIF_VALUE: 31
PIF_VALUE: 27
PIF_VALUE: 29
PIF_VALUE: 30
PIF_VALUE: 29
PIF_VALUE: 20
PIF_VALUE: 1
PIF_VALUE: 30
PIF_VALUE: 30
PIF_VALUE: 27
PIF_VALUE: 31
PIF_VALUE: 31
PIF_VALUE: 18
PIF_VALUE: 31
PIF_VALUE: 29
PIF_VALUE: 22
PIF_VALUE: 29
PIF_VALUE: 3
PIF_VALUE: 18
PIF_VALUE: 31
PIF_VALUE: 16
PIF_VALUE: 3
PIF_VALUE: 28
PIF_VALUE: 27
PIF_VALUE: 16
PIF_VALUE: 19
PIF_VALUE: 21
PIF_VALUE: 31
PIF_VALUE: 29
PIF_VALUE: 28
PIF_VALUE: 28
PIF_VALUE: 31
PIF_VALUE: 31
PIF_VALUE: 28
PIF_VALUE: 28
PIF_VALUE: 31
PIF_VALUE: 27
PIF_VALUE: 18
PIF_VALUE: 23
PIF_VALUE: 21
PIF_VALUE: 28
PIF_VALUE: 31
PIF_VALUE: 28
PIF_VALUE: 27
PIF_VALUE: 12
PIF_VALUE: 17
PIF_VALUE: 1
PIF_VALUE: 16
PIF_VALUE: 30
PIF_VALUE: 29
PIF_VALUE: 30
PIF_VALUE: 31
PIF_VALUE: 28
PIF_VALUE: 29
PIF_VALUE: 28
PIF_VALUE: 15
PIF_VALUE: 31
PIF_VALUE: 32
PIF_VALUE: 29
PIF_VALUE: 31
PIF_VALUE: 30
PIF_VALUE: 29
PIF_VALUE: 30
PIF_VALUE: 29
PIF_VALUE: 30
PIF_VALUE: 29
PIF_VALUE: 29
PIF_VALUE: 31
PIF_VALUE: 21
PIF_VALUE: 30
PIF_VALUE: 28
PIF_VALUE: 29
PIF_VALUE: 30
PIF_VALUE: 28
PIF_VALUE: 28
PIF_VALUE: 31
PIF_VALUE: 4
PIF_VALUE: 28
PIF_VALUE: 18
PIF_VALUE: 28
PIF_VALUE: 31
PIF_VALUE: 16
PIF_VALUE: 29
PIF_VALUE: 3
PIF_VALUE: 31
PIF_VALUE: 30
PIF_VALUE: 14
PIF_VALUE: 27
PIF_VALUE: 28
PIF_VALUE: 29
PIF_VALUE: 30
PIF_VALUE: 3
PIF_VALUE: 31
PIF_VALUE: 30
PIF_VALUE: 30
PIF_VALUE: 29
PIF_VALUE: 30
PIF_VALUE: 31
PIF_VALUE: 29
PIF_VALUE: 30
PIF_VALUE: 3
PIF_VALUE: 30
PIF_VALUE: 28
PIF_VALUE: 30
PIF_VALUE: 5
PIF_VALUE: 26
PIF_VALUE: 30
PIF_VALUE: 31
PIF_VALUE: 28
PIF_VALUE: 20
PIF_VALUE: 3
PIF_VALUE: 31
PIF_VALUE: 32
PIF_VALUE: 28
PIF_VALUE: 19
PIF_VALUE: 30
PIF_VALUE: 27
PIF_VALUE: 3
PIF_VALUE: 29
PIF_VALUE: 21
PIF_VALUE: 27
PIF_VALUE: 31
PIF_VALUE: 30
PIF_VALUE: 31
PIF_VALUE: 31
PIF_VALUE: 27
PIF_VALUE: 29
PIF_VALUE: 30
PIF_VALUE: 31
PIF_VALUE: 28
PIF_VALUE: 31
PIF_VALUE: 32
PIF_VALUE: 1
PIF_VALUE: 28
PIF_VALUE: 31
PIF_VALUE: 30
PIF_VALUE: 31
PIF_VALUE: 30
PIF_VALUE: 28
PIF_VALUE: 30
PIF_VALUE: 18
PIF_VALUE: 31
PIF_VALUE: 31
PIF_VALUE: 28
PIF_VALUE: 29
PIF_VALUE: 29
PIF_VALUE: 28
PIF_VALUE: 15
PIF_VALUE: 30
PIF_VALUE: 29
PIF_VALUE: 28
PIF_VALUE: 29
PIF_VALUE: 29
PIF_VALUE: 28
PIF_VALUE: 1
PIF_VALUE: 31
PIF_VALUE: 27
PIF_VALUE: 29
PIF_VALUE: 7
PIF_VALUE: 28
PIF_VALUE: 28
PIF_VALUE: 29
PIF_VALUE: 30
PIF_VALUE: 22
PIF_VALUE: 20
PIF_VALUE: 0
PIF_VALUE: 1
PIF_VALUE: 31
PIF_VALUE: 20
PIF_VALUE: 27
PIF_VALUE: 31
PIF_VALUE: 29
PIF_VALUE: 3
PIF_VALUE: 21
PIF_VALUE: 29
PIF_VALUE: 30
PIF_VALUE: 30
PIF_VALUE: 31
PIF_VALUE: 30
PIF_VALUE: 32
PIF_VALUE: 28
PIF_VALUE: 18
PIF_VALUE: 27
PIF_VALUE: 32
PIF_VALUE: 28
PIF_VALUE: 27
PIF_VALUE: 29
PIF_VALUE: 29
PIF_VALUE: 1
PIF_VALUE: 29
PIF_VALUE: 29
PIF_VALUE: 30
PIF_VALUE: 30
PIF_VALUE: 28
PIF_VALUE: 27
PIF_VALUE: 29
PIF_VALUE: 31
PIF_VALUE: 31
PIF_VALUE: 18
PIF_VALUE: 28
PIF_VALUE: 2
PIF_VALUE: 31
PIF_VALUE: 29
PIF_VALUE: 30
PIF_VALUE: 29
PIF_VALUE: 29
PIF_VALUE: 28
PIF_VALUE: 28
PIF_VALUE: 30
PIF_VALUE: 4
PIF_VALUE: 29
PIF_VALUE: 28
PIF_VALUE: 3
PIF_VALUE: 30
PIF_VALUE: 30
PIF_VALUE: 18
PIF_VALUE: 30
PIF_VALUE: 29
PIF_VALUE: 30
PIF_VALUE: 29
PIF_VALUE: 30
PIF_VALUE: 16
PIF_VALUE: 29
PIF_VALUE: 30
PIF_VALUE: 31
PIF_VALUE: 29
PIF_VALUE: 30
PIF_VALUE: 29
PIF_VALUE: 18
PIF_VALUE: 32
PIF_VALUE: 3
PIF_VALUE: 31
PIF_VALUE: 29
PIF_VALUE: 28
PIF_VALUE: 32
PIF_VALUE: 28
PIF_VALUE: 21
PIF_VALUE: 31
PIF_VALUE: 31
PIF_VALUE: 2
PIF_VALUE: 28
PIF_VALUE: 29
PIF_VALUE: 26
PIF_VALUE: 30
PIF_VALUE: 28
PIF_VALUE: 30
PIF_VALUE: 29
PIF_VALUE: 30
PIF_VALUE: 29
PIF_VALUE: 31
PIF_VALUE: 22
PIF_VALUE: 20
PIF_VALUE: 22
PIF_VALUE: 31
PIF_VALUE: 31
PIF_VALUE: 29
PIF_VALUE: 26
PIF_VALUE: 30
PIF_VALUE: 21
PIF_VALUE: 30
PIF_VALUE: 29
PIF_VALUE: 31
PIF_VALUE: 31
PIF_VALUE: 29
PIF_VALUE: 28
PIF_VALUE: 16
PIF_VALUE: 31
PIF_VALUE: 28
PIF_VALUE: 30
PIF_VALUE: 31
PIF_VALUE: 30
PIF_VALUE: 29
PIF_VALUE: 30
PIF_VALUE: 18
PIF_VALUE: 28
PIF_VALUE: 30
PIF_VALUE: 29
PIF_VALUE: 27
PIF_VALUE: 29
PIF_VALUE: 28
PIF_VALUE: 31
PIF_VALUE: 27
PIF_VALUE: 31
PIF_VALUE: 28
PIF_VALUE: 29
PIF_VALUE: 31
PIF_VALUE: 29
PIF_VALUE: 29
PIF_VALUE: 31
PIF_VALUE: 18
PIF_VALUE: 29
PIF_VALUE: 28
PIF_VALUE: 28
PIF_VALUE: 5
PIF_VALUE: 30
PIF_VALUE: 31
PIF_VALUE: 29
PIF_VALUE: 29
PIF_VALUE: 32
PIF_VALUE: 30
PIF_VALUE: 20
PIF_VALUE: 29
PIF_VALUE: 28
PIF_VALUE: 29
PIF_VALUE: 28
PIF_VALUE: 30
PIF_VALUE: 31

## 2020-08-28 ASSESSMENT — PAIN DESCRIPTION - LOCATION: LOCATION: HEAD

## 2020-08-28 ASSESSMENT — PAIN SCALES - GENERAL
PAINLEVEL_OUTOF10: 0
PAINLEVEL_OUTOF10: 5
PAINLEVEL_OUTOF10: 6
PAINLEVEL_OUTOF10: 0

## 2020-08-28 ASSESSMENT — PAIN DESCRIPTION - PAIN TYPE: TYPE: SURGICAL PAIN

## 2020-08-28 ASSESSMENT — PAIN DESCRIPTION - PROGRESSION: CLINICAL_PROGRESSION: GRADUALLY IMPROVING

## 2020-08-28 ASSESSMENT — PAIN DESCRIPTION - FREQUENCY: FREQUENCY: CONTINUOUS

## 2020-08-28 ASSESSMENT — PAIN DESCRIPTION - ONSET: ONSET: ON-GOING

## 2020-08-28 ASSESSMENT — PAIN DESCRIPTION - ORIENTATION: ORIENTATION: LEFT

## 2020-08-28 ASSESSMENT — PAIN DESCRIPTION - DESCRIPTORS: DESCRIPTORS: SORE;DISCOMFORT;TENDER

## 2020-08-28 NOTE — ANESTHESIA PROCEDURE NOTES
Arterial Line:    An arterial line was placed using surface landmarks, in the OR for the following indication(s): continuous blood pressure monitoring and blood sampling needed. A 20 gauge (size), 1 and 3/8 inch (length), Arrow (type) catheter was placed, Seldinger technique not used, into the right radial artery, secured by tape and Tegaderm. Anesthesia type: General    Events:  patient tolerated procedure well with no complications. Anesthesiologist: Bharti Thompson DO  Resident/CRNA: REAGAN Peter - ELINOR  Other anesthesia staff: Denis Ferguson RN  Performed:  Other anesthesia staff   Preanesthetic Checklist  Completed: patient identified, site marked, surgical consent, pre-op evaluation, timeout performed, IV checked, risks and benefits discussed, monitors and equipment checked, anesthesia consent given, oxygen available and patient being monitored

## 2020-08-28 NOTE — PLAN OF CARE
Problem: Pain:  Goal: Control of acute pain  Description: Control of acute pain  8/28/2020 1633 by Luis Carlos Queen RN  Outcome: Met This Shift    Problem: Airway Clearance - Ineffective:  Goal: Ability to maintain a clear airway will improve  Description: Ability to maintain a clear airway will improve  Outcome: Met This Shift     Problem: Cardiac Output - Decreased:  Goal: Hemodynamic stability will improve  Description: Hemodynamic stability will improve  Outcome: Met This Shift     Problem: Fluid Volume - Imbalance:  Goal: Absence of imbalanced fluid volume signs and symptoms  Description: Absence of imbalanced fluid volume signs and symptoms  Outcome: Met This Shift

## 2020-08-28 NOTE — CARE COORDINATION
Patient to have a crani today with Dr Jessica Smith. Will need updated therapy evals after surgery when patient able to work with therapy.

## 2020-08-28 NOTE — BRIEF OP NOTE
Brief Postoperative Note      Patient: Skyla Lugo  YOB: 1960  MRN: [id number]    Date of Procedure: 8/28/2020    Pre-Op Diagnosis: .Large Brain Tumor Left Occipital Lobe  Post-Op Diagnosis: Same       Procedure(s):  CRANIOTOMY FRAMELESS STEREOTATIC FOR BRAIN TUMOR  Excision of Left occipital Sebaceous cyst    Surgeon(s):  Lele Gonzalez MD    Assistant:  Surgical Assistant: Sher Hatfield    Anesthesia: General    Estimated Blood Loss (mL): 772    Complications: None    Specimens:   ID Type Source Tests Collected by Time Destination   A : SEBACEOUS CYST Tissue Tissue SURGICAL PATHOLOGY Lele Gonzalez MD 8/28/2020 1002    B : [address] Tissue Tissue SURGICAL PATHOLOGY Lele Gonzalez MD 8/28/2020 1003    C : [address] [facility] [employer] Tissue Tissue SURGICAL PATHOLOGY Lele Gonzalez MD 8/28/2020 1036        Implants:  Implant Name Type Inv.  Item Serial No.  Lot No. LRB No. Used Action   SCREW UN3 1.5X4MM ST 5/PK Screw/Plate/Nail/Miguel SCREW UN3 1.5X4MM ST 5/PK  [employer]: ORTHOPAEDICS  N/A 8 Implanted   PLATE LPROF DBL Y X RIGD 2 HL 12MM Screw/Plate/Nail/Miguel PLATE LPROF DBL Y X RIGD 2 HL 12MM  [employer]: ORTHOPAEDICS  N/A 5 Implanted         Drains:   Urethral Catheter Non-latex 16 fr (Active)       Findings: As expected      Electronically signed by Lele Gonzalez MD on 8/28/2020 at 4:09 PM

## 2020-08-28 NOTE — PROGRESS NOTES
Hospital Medicine Progress Note      Date of Admission: 8/23/2020  Hospital day # 5    Elevated troponin likely due to demand ischemia secondary to elevated blood pressure. 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 Psychosis    Subjective    Overnight, patient not wanting to see a neurosurgeon. Confused. Exam:    BP (!) 180/98   Pulse 83   Temp 97.1 °F (36.2 °C) (Temporal)   Resp 20   Ht 5' 3\" (1.6 m)   Wt 207 lb 14.4 oz (94.3 kg)   SpO2 95%   BMI 36.83 kg/m²     General appearance: No apparent distress, appears stated age and cooperative. HEENT: Pupils equal, round, and reactive to light. Conjunctivae/corneas clear. Neck: Supple. No jugular venous distention. Trachea midline. Respiratory:  Normal respiratory effort. Clear to auscultation, bilaterally without Rales/Wheezes/Rhonchi. Cardiovascular: S1/S2   Abdomen: Soft, non-tender, non-distended with normal bowel sounds. Musculoskeletal: No clubbing, cyanosis or edema bilaterally.    Skin:  No rashes    Neurologic: awake, alert and following commands     Medications:  Reviewed    Infusion Medications   Scheduled Medications    levETIRAcetam  500 mg Oral BID    dexamethasone  4 mg Intravenous Q6H    enoxaparin  40 mg Subcutaneous Daily    sodium chloride flush  10 mL Intravenous 2 times per day    atorvastatin  80 mg Oral Nightly    aspirin  81 mg Oral Daily    enalapril  10 mg Oral Daily    metoprolol succinate  50 mg Oral Daily     PRN Meds: sodium chloride flush, acetaminophen **OR** acetaminophen, polyethylene glycol, promethazine **OR** ondansetron, hydrALAZINE    I/O  No intake or output data in the 24 hours ending 08/28/20 0846    Labs:   Recent Labs     08/26/20 1007 08/27/20  0933 08/28/20  0535   WBC 6.7 9.2 10.9   HGB 15.1 14.6 14.5   HCT 47.1 44.1 45.5    266 299       Recent Labs     08/26/20 1007 08/27/20  0933 08/28/20  0535    137 141   K 3.7 3.6 4.2    99 104

## 2020-08-28 NOTE — PROGRESS NOTES
BEHAVIORAL HEALTH FOLLOW-UP NOTE     8/28/2020    Patient unable to be assessed due to post-op sedation. Patient has a recently diagnose meningioma which would account for the patients symptoms and not a psychiatric condition. Psychiatry will sign off at this point, the patient should follow up with neurology and outpatient psychiatry as she does not meet the criteria for inpatient psychiatric hospitalization.

## 2020-08-28 NOTE — ANESTHESIA POSTPROCEDURE EVALUATION
Department of Anesthesiology  Postprocedure Note    Patient: Juventino Minor  MRN: [id number]  YOB: 1960  Date of evaluation: 8/28/2020  Time:  3:39 PM     Procedure Summary     Date:  08/28/20 Room / Location:  [address] [place] [address] 20 / [facility]    Anesthesia Start:  4386 Anesthesia Stop:  1520    Procedure:  CRANIOTOMY FRAMELESS STEREOTATIC FOR BRAIN TUMOR (N/A Head) Diagnosis:  (.)    Surgeon:  Larissa Koyanagi, MD Responsible Provider:  Izabel Alegria MD    Anesthesia Type:  general ASA Status:  3          Anesthesia Type: general    Kristopher Phase I:      Kristopher Phase II:      Last vitals: Reviewed and per EMR flowsheets.        Anesthesia Post Evaluation    Patient location during evaluation: PACU  Patient participation: complete - patient participated  Level of consciousness: awake and alert  Airway patency: patent  Nausea & Vomiting: no nausea and no vomiting  Complications: no  Cardiovascular status: hemodynamically stable  Respiratory status: acceptable  Hydration status: euvolemic

## 2020-08-29 ENCOUNTER — APPOINTMENT (OUTPATIENT)
Dept: GENERAL RADIOLOGY | Age: 60
DRG: 025 | End: 2020-08-29
Payer: COMMERCIAL

## 2020-08-29 ENCOUNTER — APPOINTMENT (OUTPATIENT)
Dept: CT IMAGING | Age: 60
DRG: 025 | End: 2020-08-29
Payer: COMMERCIAL

## 2020-08-29 LAB
ANION GAP SERPL CALCULATED.3IONS-SCNC: 12 MMOL/L (ref 7–16)
BUN BLDV-MCNC: 19 MG/DL (ref 8–23)
CALCIUM SERPL-MCNC: 8.1 MG/DL (ref 8.6–10.2)
CHLORIDE BLD-SCNC: 107 MMOL/L (ref 98–107)
CO2: 23 MMOL/L (ref 22–29)
CREAT SERPL-MCNC: 0.8 MG/DL (ref 0.5–1)
GFR AFRICAN AMERICAN: >60
GFR NON-AFRICAN AMERICAN: >60 ML/MIN/1.73
GLUCOSE BLD-MCNC: 113 MG/DL (ref 74–99)
HCT VFR BLD CALC: 35.6 % (ref 34–48)
HEMOGLOBIN: 11.5 G/DL (ref 11.5–15.5)
MAGNESIUM: 1.9 MG/DL (ref 1.6–2.6)
MCH RBC QN AUTO: 29.1 PG (ref 26–35)
MCHC RBC AUTO-ENTMCNC: 32.3 % (ref 32–34.5)
MCV RBC AUTO: 90.1 FL (ref 80–99.9)
PDW BLD-RTO: 13.6 FL (ref 11.5–15)
PLATELET # BLD: 191 E9/L (ref 130–450)
PMV BLD AUTO: 11.5 FL (ref 7–12)
POTASSIUM SERPL-SCNC: 3.9 MMOL/L (ref 3.5–5)
RBC # BLD: 3.95 E12/L (ref 3.5–5.5)
SODIUM BLD-SCNC: 142 MMOL/L (ref 132–146)
WBC # BLD: 14.3 E9/L (ref 4.5–11.5)

## 2020-08-29 PROCEDURE — 2580000003 HC RX 258: Performed by: SURGERY

## 2020-08-29 PROCEDURE — 6360000002 HC RX W HCPCS: Performed by: NEUROLOGICAL SURGERY

## 2020-08-29 PROCEDURE — 70450 CT HEAD/BRAIN W/O DYE: CPT

## 2020-08-29 PROCEDURE — 36415 COLL VENOUS BLD VENIPUNCTURE: CPT

## 2020-08-29 PROCEDURE — 6370000000 HC RX 637 (ALT 250 FOR IP): Performed by: NEUROLOGICAL SURGERY

## 2020-08-29 PROCEDURE — 71045 X-RAY EXAM CHEST 1 VIEW: CPT

## 2020-08-29 PROCEDURE — 80048 BASIC METABOLIC PNL TOTAL CA: CPT

## 2020-08-29 PROCEDURE — APPSS180 APP SPLIT SHARED TIME > 60 MINUTES: Performed by: NURSE PRACTITIONER

## 2020-08-29 PROCEDURE — 83735 ASSAY OF MAGNESIUM: CPT

## 2020-08-29 PROCEDURE — 2000000000 HC ICU R&B

## 2020-08-29 PROCEDURE — 85027 COMPLETE CBC AUTOMATED: CPT

## 2020-08-29 PROCEDURE — 6370000000 HC RX 637 (ALT 250 FOR IP): Performed by: NURSE PRACTITIONER

## 2020-08-29 RX ORDER — SODIUM CHLORIDE 9 MG/ML
INJECTION, SOLUTION INTRAVENOUS CONTINUOUS
Status: DISCONTINUED | OUTPATIENT
Start: 2020-08-29 | End: 2020-08-29

## 2020-08-29 RX ORDER — HYDROCODONE BITARTRATE AND ACETAMINOPHEN 5; 325 MG/1; MG/1
1 TABLET ORAL EVERY 6 HOURS PRN
Status: DISCONTINUED | OUTPATIENT
Start: 2020-08-29 | End: 2020-09-11 | Stop reason: HOSPADM

## 2020-08-29 RX ADMIN — HYDROMORPHONE HYDROCHLORIDE 0.5 MG: 1 INJECTION, SOLUTION INTRAMUSCULAR; INTRAVENOUS; SUBCUTANEOUS at 03:24

## 2020-08-29 RX ADMIN — ACETAMINOPHEN 650 MG: 325 TABLET, FILM COATED ORAL at 16:57

## 2020-08-29 RX ADMIN — HYDROMORPHONE HYDROCHLORIDE 0.5 MG: 1 INJECTION, SOLUTION INTRAMUSCULAR; INTRAVENOUS; SUBCUTANEOUS at 08:42

## 2020-08-29 RX ADMIN — LEVETIRACETAM 500 MG: 5 INJECTION INTRAVENOUS at 16:25

## 2020-08-29 RX ADMIN — METOPROLOL SUCCINATE 50 MG: 50 TABLET, EXTENDED RELEASE ORAL at 09:27

## 2020-08-29 RX ADMIN — LEVETIRACETAM 500 MG: 5 INJECTION INTRAVENOUS at 05:21

## 2020-08-29 RX ADMIN — POTASSIUM BICARBONATE 20 MEQ: 782 TABLET, EFFERVESCENT ORAL at 14:33

## 2020-08-29 RX ADMIN — MAGNESIUM GLUCONATE 500 MG ORAL TABLET 400 MG: 500 TABLET ORAL at 14:33

## 2020-08-29 RX ADMIN — ONDANSETRON 4 MG: 2 INJECTION INTRAMUSCULAR; INTRAVENOUS at 08:49

## 2020-08-29 RX ADMIN — ENALAPRIL MALEATE 10 MG: 10 TABLET ORAL at 09:27

## 2020-08-29 RX ADMIN — SODIUM CHLORIDE: 9 INJECTION, SOLUTION INTRAVENOUS at 10:08

## 2020-08-29 ASSESSMENT — PAIN DESCRIPTION - PAIN TYPE: TYPE: ACUTE PAIN;SURGICAL PAIN

## 2020-08-29 ASSESSMENT — PAIN DESCRIPTION - ORIENTATION: ORIENTATION: LEFT

## 2020-08-29 ASSESSMENT — PAIN DESCRIPTION - ONSET: ONSET: GRADUAL

## 2020-08-29 ASSESSMENT — PAIN SCALES - GENERAL
PAINLEVEL_OUTOF10: 6
PAINLEVEL_OUTOF10: 0
PAINLEVEL_OUTOF10: 1
PAINLEVEL_OUTOF10: 0
PAINLEVEL_OUTOF10: 0
PAINLEVEL_OUTOF10: 3
PAINLEVEL_OUTOF10: 4
PAINLEVEL_OUTOF10: 0
PAINLEVEL_OUTOF10: 3

## 2020-08-29 ASSESSMENT — PAIN DESCRIPTION - FREQUENCY: FREQUENCY: INTERMITTENT

## 2020-08-29 ASSESSMENT — PAIN - FUNCTIONAL ASSESSMENT: PAIN_FUNCTIONAL_ASSESSMENT: PREVENTS OR INTERFERES SOME ACTIVE ACTIVITIES AND ADLS

## 2020-08-29 ASSESSMENT — PAIN DESCRIPTION - LOCATION: LOCATION: HEAD

## 2020-08-29 ASSESSMENT — PAIN DESCRIPTION - DESCRIPTORS: DESCRIPTORS: ACHING;SORE;DISCOMFORT

## 2020-08-29 ASSESSMENT — PAIN DESCRIPTION - PROGRESSION: CLINICAL_PROGRESSION: GRADUALLY WORSENING

## 2020-08-29 NOTE — OP NOTE
[address]                  [address] [place],  [facility]                                OPERATIVE REPORT    PATIENT NAME: Loretta Teague                  :        1960  MED REC NO:   [id number]                            ROOM:       3820  ACCOUNT NO:   [de-identified]                           ADMIT DATE: 2020  PROVIDER:     Dennie Almond, MD    DATE OF PROCEDURE:  2020    SURGEON:  Dennie Almond, MD    PREOPERATIVE DIAGNOSIS:  Large left parietooccipital lobe tumor, most  probably meningioma. POSTOPERATIVE DIAGNOSES:  Large left parietooccipital lobe tumor, most  probably meningioma; and large sebaceous cyst, left occipital scalp. PROCEDURES:  1. Frameless stereotactic left parietooccipital craniotomy for the  excision of large meningioma. 2.  Excision of large sebaceous cyst, left occipital scalp. TECHNIQUE:  The patient was placed on the operating room table in supine  position and after satisfactory endotracheal general anesthesia had been  administered, the patient's head was fixed in the Pickett headrest.   She was placed in the prone position with neck slightly flexed. The  parietooccipital scalp, particularly on the left side, was shaved and  prepared with Betadine scrub and solution and routinely draped. The  large sebaceous cyst included in this scalp prep. The reference frame of the O-arm had been attached through the Pickett  headrest.  The patient had undergone a CT scan of the brain with  contrast enhancement with a BRAVO protocol. The information from the  CAT scanner was transferred to the Stealth, completed in the operating  room. Registration of the skull and the occipital and suboccipital  scalp was performed, thus calibrating the probe of the Stealth which was  then used to define the various structures and for navigation purpose. The sebaceous cyst was to be tackled initially.   A horizontal Gelfoam over the sagittal  sinus, the dura was further opened laterally and anteriorly and  reflected medially, thus stopping the oozing from the midline area. As  soon as the dura was opened, part of the cortical surface was visualized  more so anteriorly and posteriorly then laterally and medially. In the  middle part of the exposed brain, the surface was covered with a little  bit of blood and a large structure which has thick boundary. The  capsule of the tumor was then entered into by incising with a knife and  hemostasis secured. Biopsy of the tumor which was inside the capsule  was taken and sent to Pathology. It was reported to be meningioma. In  the meanwhile, debulking of the tumor was performed piecemeal using  biopsy forceps and cautery and bipolar cautery. Piecemeal debulking of  the central tumor was performed. Then it was noted that interspace  could be developed between the capsule of the tumor as well as the  cortical surface of the parietal and occipital lobes. The interspace  between the tumor and the surface of the cortex was taken all the way  around the boundaries of the tumor. After further debulking was  performed, the interspace could be developed between the base of the  tumor and the brain itself. All along, as the dissection was carried  out, small vessels which were entering from the brain into the tumor  were cauterized and cut. Once enough debulking had been performed, the  boundaries of the capsule were further defined and it was  from  the deeper part of the brain, also from the undersurface of the cortex  all the way to the midline falx where it was attached to the jayy  itself. The tumor was arising from the falx. Most of the tumor was   from the jayy except a couple of small fragments were left,  which were cauterized. There was moderate amount of bleeding from the  surface of the cortex.   This bleeding was mainly venous bleeding and  most probably from the inferior sagittal sinus. After the hemostasis  had been secured to the extent where, maybe, only a couple of minute  fragments could have been still attached to the lower part of the falx,  it was decided to not explore that again. After cauterization, the  surface of the falx was covered with pieces of Surgicel, Gelfoam, and  Surgicel and hemostasis secured. Once the tumor had been removed almost  completely except for a couple of fragments which were still attached to  the falx, the surface of the cortex which was covering with tumor was  lined with pieces of Surgicel. Also, the base of the tumor in a deeper  part of the cortex was covered with pieces of Surgicel. Surgicel was  also lined over the cortex of the parietal and the occipital lobe, which  were exposed; thus, securing the hemostasis. The brain did start to fill  in the space, although there was still some room left in the brain and  _____. Then the rest of the surface of the falx close to the attachment  of the tumor was also cauterized. Then dura was closed using 4-0  Surgilon suture around except posteriorly where there was a small dura  defect which was covered with a piece of Gelfoam.  Tacking suture  applied around the dural edges at three to four places. More Gelfoam was used to cover the area of Surgicel sinus. Then the  bone flaps were replaced using five dog-bone type Bioplate. The  approximation of the dura was satisfactory. Most of the interspace  between the bone flap and the remainder of the skull was filled with  pieces of Gelfoam.  Vancomycin powder was sprinkled over the closure of  the bone. The galea aponeurotica was closed with 3-0 Surgilon sutures. Skin edges were approximated with 3-0 nylon suture. The scalp incision  for the excision of sebaceous cyst had already been closed with 3-0  nylon suture. Dry dressing was applied over the incision area.   The  patient was sent to recovery room in satisfactory state.  The patient  tolerated the procedure well. Estimated blood loss was 600 mL.   The  specimen was the brain tumor that is meningioma and sebaceous cyst.        Ynes Low MD    D: 08/28/2020 23:50:41       T: 08/29/2020 5:24:44     [email or web address]  Job#: [id number]     Doc#: [id number]    CC:

## 2020-08-29 NOTE — PROCEDURES
Yumiko Reagan is a 56 y.o. female patient. 1. Acute psychosis ([address]    2. Hypokalemia    3. Elevated troponin      Past Medical History:   Diagnosis Date    Brain aneurysm     Hypertension     Lumbar herniated disc      Blood pressure 117/65, pulse 68, temperature 98 °F (36.7 °C), temperature source Temporal, resp. rate (!) 31, height 5' 3\" (1.6 m), weight 199 lb 12.8 oz (90.6 kg), SpO2 100 %. Central Line    Date/Time: 8/28/2020 11:46 PM  Performed by: John Mcnair MD  Authorized by: Talib Gutierrez MD   Consent: Written consent obtained. Risks and benefits: risks, benefits and alternatives were discussed  Consent given by: power of   Patient identity confirmed: arm band  Time out: Immediately prior to procedure a \"time out\" was called to verify the correct patient, procedure, equipment, support staff and site/side marked as required.   Indications: vascular access  Anesthesia: local infiltration    Anesthesia:  Local Anesthetic: lidocaine 1% without epinephrine  Anesthetic total: 5 mL  Preparation: skin prepped with 2% chlorhexidine  Skin prep agent dried: skin prep agent completely dried prior to procedure  Sterile barriers: all five maximum sterile barriers used - cap, mask, sterile gown, sterile gloves, and large sterile sheet  Hand hygiene: hand hygiene performed prior to central venous catheter insertion  Location details: right subclavian  Patient position: reverse Trendelenburg  Catheter type: triple lumen  Catheter size: 9 Fr  Pre-procedure: landmarks identified  Ultrasound guidance: no  Number of attempts: 1  Successful placement: yes  Post-procedure: line sutured and dressing applied  Assessment: blood return through all ports,  free fluid flow,  placement verified by x-ray and no pneumothorax on x-ray  Patient tolerance: Patient tolerated the procedure well with no immediate complications  Comments: Dr. Manoj House present for procedure           John Mcnair

## 2020-08-29 NOTE — PROGRESS NOTES
Hospital Medicine Progress Note      Date of Admission: 8/23/2020  Hospital day # 6    Elevated troponin likely due to demand ischemia secondary to elevated blood pressure. 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 Psychosis    Subjective    Had craniotomy yesterday. Now in the ICU    Exam:    BP (!) 141/87   Pulse 79   Temp 98.2 °F (36.8 °C) (Temporal)   Resp 15   Ht 5' 3\" (1.6 m)   Wt 199 lb 12.8 oz (90.6 kg)   SpO2 98%   BMI 35.39 kg/m²     General appearance: No apparent distress, appears stated age and cooperative. HEENT: Normocephalic, atraumatic, facial features symmetrical.  EOMI. Surgical dressing in place  Neck: Supple. No jugular venous distention. Trachea midline. Respiratory:  Normal respiratory effort. Clear to auscultation, bilaterally without Rales/Wheezes/Rhonchi. Cardiovascular: S1/S2   Abdomen: Soft, non-tender, non-distended with normal bowel sounds. Musculoskeletal: No clubbing, cyanosis or edema bilaterally.    Skin:  No rashes    Neurologic: awake, alert and following commands     Medications:  Reviewed    Infusion Medications    sodium chloride 75 mL/hr at 08/29/20 1008     Scheduled Medications    levetiracetam  500 mg Intravenous Q12H    sodium chloride flush  10 mL Intravenous 2 times per day    atorvastatin  80 mg Oral Nightly    enalapril  10 mg Oral Daily    metoprolol succinate  50 mg Oral Daily     PRN Meds: HYDROmorphone **OR** HYDROmorphone, sodium chloride flush, acetaminophen **OR** acetaminophen, polyethylene glycol, [DISCONTINUED] promethazine **OR** ondansetron, hydrALAZINE    I/O    Intake/Output Summary (Last 24 hours) at 8/29/2020 1026  Last data filed at 8/29/2020 1000  Gross per 24 hour   Intake 1650 ml   Output 2410 ml   Net -760 ml       Labs:   Recent Labs     08/27/20  0933 08/28/20  0535 08/28/20  1810 08/29/20  0329   WBC 9.2 10.9  --  14.3*   HGB 14.6 14.5 12.1 11.5   HCT 44.1 45.5 37.0 35.6    299 --  191       Recent Labs     08/27/20  0933 08/28/20  0535  08/28/20  1214 08/28/20  1343 08/29/20  0329    141  --   --   --  142   K 3.6 4.2   < > 4.3 4.1 3.9   CL 99 104  --   --   --  107   CO2 25 23  --   --   --  23   BUN 16 17  --   --   --  19   CREATININE 0.8 0.7  --   --   --  0.8   CALCIUM 9.2 9.6  --   --   --  8.1*    < > = values in this interval not displayed. No results for input(s): PROT, ALB, ALKPHOS, ALT, AST, BILITOT, AMYLASE, LIPASE in the last 72 hours. No results for input(s): INR in the last 72 hours. No results for input(s): Aleksandr Kassandra in the last 72 hours. Other labs:  Lab Results   Component Value Date    CHOL 182 08/24/2020    TRIG 133 08/24/2020    HDL 44 08/24/2020    LDLCALC 111 (H) 08/24/2020    TSH 1.552 10/12/2010    INR 1.1 08/23/2020    LABA1C 5.6 05/11/2017       ASSESSMENT:  -Enlarging parasagittal meningioma causing mass-effect in the left occipital lobe  -POD #1 s/p craniotomy, excision large meningioma, excision of large sebaceous cyst left occipital scalp  -Elevated troponins  -Psychosis  -Hypertension  -History of brain aneurysm       PLAN:  Neurosurgery following  Intensivist following  Keppra 500 mg IV twice daily  Hydralazine IV prn SBP >150  Continue Toprol  Continue Enalapril      Diet: Diet NPO Effective Now  Code Status: Full Code    PT/OT Eval Status: [] Ordered [] Evaluation noted [x] Not applicable    DVT Prophylaxis: []Lovenox []Heparin [x]PCD [] [address] []Encouraged ambulation []N/A    Likely disposition when able:  []Home [] Home with [facility] [] SNF/LOLLY [] Acute Rehab [] LTAC [x]Other    +++++++++++++++++++++++++++++++++++++++++++++++++  Can Young DO, Hospitalist  +++++++++++++++++++++++++++++++++++++++++++++++++  NOTE: This report was transcribed using voice recognition software.  Every effort was made to ensure accuracy; however, inadvertent computerized transcription errors may be present.

## 2020-08-29 NOTE — PLAN OF CARE
Problem: Falls - Risk of:  Goal: Will remain free from falls  Description: Will remain free from falls  Outcome: Met This Shift  Goal: Absence of physical injury  Description: Absence of physical injury  Outcome: Met This Shift     Problem: Skin Integrity:  Goal: Will show no infection signs and symptoms  Description: Will show no infection signs and symptoms  Outcome: Met This Shift  Goal: Absence of new skin breakdown  Description: Absence of new skin breakdown  Outcome: Met This Shift     Problem: Pain:  Goal: Pain level will decrease  Description: Pain level will decrease  Outcome: Met This Shift  Goal: Control of acute pain  Description: Control of acute pain  Outcome: Met This Shift     Problem: Anxiety/Stress:  Goal: Level of anxiety will decrease  Description: Level of anxiety will decrease  Outcome: Met This Shift

## 2020-08-29 NOTE — PROGRESS NOTES
[facility] Int[facility] Care Unit  Critical Care  Daily Progress Note 8/29/2020    Date of Admission: 8/28/20  CC:  S/p 300 [facility]  8/23/20 Admitted with psychosis. Troponin levels were elevated. Hypertensive  8/26/20 Cardiology determined elevated troponin levels rt  Demand ischemia with HTN.    8/28/20 Underwent craniotomy parietoccipital for removal of meningioma, occipital for removal of cyst.   8/29/20 No significant events    OVERNIGHT EVENTS: T max  98.1 F. Did not require additional doses of antihypertensive agents or insulin in the previous 24 hours. PHYSICAL EXAM:    /83   Pulse 81   Temp 98.1 °F (36.7 °C) (Temporal)   Resp 13   Ht 5' 3\" (1.6 m)   Wt 199 lb 12.8 oz (90.6 kg)   SpO2 98%   BMI 35.39 kg/m²     Intake/Output Summary (Last 24 hours) at 8/29/2020 1305  Last data filed at 8/29/2020 1200  Gross per 24 hour   Intake 650 ml   Output 2585 ml   Net -1935 ml         General appearance:  Comfortable. NEUROLOGIC:        GCS:    3 - Opens eyes to loud noise or command   6 - Follows simple motor commands  4 - Seems confused, disoriented       Pupil size:  Left 3 mm  Right 3 mm  Pupil reaction: Yes   PERRLA  Wiggles fingers: Left Yes Right Yes  Hand grasp:   Left present     Right present  Wiggles toes: Left Yes    Right Yes  Plantar flexion: Left present    Right present  Facial droop:   None   Speech:  Clear      CONSTITUTIONAL: No acute distress  CARDIOVASCULAR: S1 S2, regular rate, regular rhythm,Monitor: SR  PULMONARY:  Respirations unlabored. No rhonchi/rales/wheezes. RENAL:  Julien to gravity, clear yellow urine. ABDOMEN: Soft, nontender, nondistended, nontympanic, normal bowel sounds. MUSCULOSKELETAL: NESS  SKIN/EXTREMITIES: No rashes/ecchymosis, no edema/clubbing, warm/dry, good capillary refill.      IV ACCESS:   Right subclavian TLC D1      ASSESSMENT/PLAN:     Active Problems:    Non-ST elevation MI (NSTEMI) (HCC)    NSTEMI (non-ST elevated myocardial

## 2020-08-29 NOTE — PLAN OF CARE
Problem: Falls - Risk of:  Goal: Will remain free from falls  Description: Will remain free from falls  8/29/2020 1648 by Vicky Zavala RN  Outcome: Met This Shift  8/29/2020 0834 by Oli Christianson RN  Outcome: Met This Shift  Goal: Absence of physical injury  Description: Absence of physical injury  8/29/2020 1648 by Vicky Zavala RN  Outcome: Met This Shift  8/29/2020 0834 by Oli Christianson RN  Outcome: Met This Shift     Problem: Skin Integrity:  Goal: Will show no infection signs and symptoms  Description: Will show no infection signs and symptoms  8/29/2020 1648 by Vicky Zavala RN  Outcome: Met This Shift  8/29/2020 0834 by Oli Christianson RN  Outcome: Met This Shift  Goal: Absence of new skin breakdown  Description: Absence of new skin breakdown  8/29/2020 1648 by Vicky Zavala RN  Outcome: Met This Shift  8/29/2020 0834 by Oli Christianson RN  Outcome: Met This Shift     Problem: Pain:  Goal: Pain level will decrease  Description: Pain level will decrease  8/29/2020 1648 by Vicky Zavala RN  Outcome: Met This Shift  8/29/2020 0834 by Oli Christianson RN  Outcome: Met This Shift  Goal: Control of acute pain  Description: Control of acute pain  8/29/2020 1648 by Vicky Zavala RN  Outcome: Met This Shift  8/29/2020 0834 by Oli Christianson RN  Outcome: Met This Shift     Problem: Discharge Planning:  Goal: Participates in care planning  Description: Participates in care planning  Outcome: Ongoing  Goal: Discharged to appropriate level of care  Description: Discharged to appropriate level of care  Outcome: Ongoing     Problem: Airway Clearance - Ineffective:  Goal: Ability to maintain a clear airway will improve  Description: Ability to maintain a clear airway will improve  Outcome: Met This Shift     Problem: Anxiety/Stress:  Goal: Level of anxiety will decrease  Description: Level of anxiety will decrease  8/29/2020 1648 by Vicky Zavala RN  Outcome: Met This Shift  8/29/2020 0834 by Theron Christianson RN  Outcome: Met This Shift     Problem: Aspiration:  Goal: Absence of aspiration  Description: Absence of aspiration  Outcome: Met This Shift     Problem:  Bowel Function - Altered:  Goal: Bowel elimination is within specified parameters  Description: Bowel elimination is within specified parameters  Outcome: Met This Shift     Problem: Cardiac Output - Decreased:  Goal: Hemodynamic stability will improve  Description: Hemodynamic stability will improve  Outcome: Met This Shift     Problem: Fluid Volume - Imbalance:  Goal: Absence of imbalanced fluid volume signs and symptoms  Description: Absence of imbalanced fluid volume signs and symptoms  Outcome: Met This Shift     Problem: Gas Exchange - Impaired:  Goal: Levels of oxygenation will improve  Description: Levels of oxygenation will improve  Outcome: Met This Shift     Problem: Mental Status - Impaired:  Goal: Mental status will be restored to baseline  Description: Mental status will be restored to baseline  Outcome: Ongoing     Problem: Nutrition Deficit:  Goal: Ability to achieve adequate nutritional intake will improve  Description: Ability to achieve adequate nutritional intake will improve  Outcome: Met This Shift     Problem: Pain:  Goal: Recognizes and communicates pain  Description: Recognizes and communicates pain  Outcome: Met This Shift  Goal: Control of acute pain  Description: Control of acute pain  Outcome: Met This Shift     Problem: Serum Glucose Level - Abnormal:  Goal: Ability to maintain appropriate glucose levels will improve to within specified parameters  Description: Ability to maintain appropriate glucose levels will improve to within specified parameters  Outcome: Met This Shift     Problem: Skin Integrity - Impaired:  Goal: Will show no infection signs and symptoms  Description: Will show no infection signs and symptoms  Outcome: Met This Shift  Goal: Absence of new skin breakdown  Description: Absence of new skin breakdown  Outcome: Met This Shift     Problem: Sleep Pattern Disturbance:  Goal: Appears well-rested  Description: Appears well-rested  Outcome: Met This Shift     Problem: Tissue Perfusion, Altered:  Goal: Circulatory function within specified parameters  Description: Circulatory function within specified parameters  Outcome: Met This Shift     Problem: Tissue Perfusion - Cardiopulmonary, Altered:  Goal: Absence of angina  Description: Absence of angina  Outcome: Met This Shift  Goal: Hemodynamic stability will improve  Description: Hemodynamic stability will improve  Outcome: Met This Shift

## 2020-08-29 NOTE — PROGRESS NOTES
PO#1  PROCEDURES:  1. Frameless stereotactic left parietooccipital craniotomy for the  excision of large meningioma. 2.  Excision of large sebaceous cyst, left occipital scalp. Doing well. Has calmed down somewhat. No focal motor/sensory deficit per se. Speech is fair. Following commands. Reviewed CT scan from today:  Stable postoperative changes.  No evidence of increasing hemorrhage or    mass effect.           Will follow along

## 2020-08-30 LAB
ANION GAP SERPL CALCULATED.3IONS-SCNC: 8 MMOL/L (ref 7–16)
BUN BLDV-MCNC: 13 MG/DL (ref 8–23)
CALCIUM SERPL-MCNC: 8.2 MG/DL (ref 8.6–10.2)
CHLORIDE BLD-SCNC: 103 MMOL/L (ref 98–107)
CO2: 28 MMOL/L (ref 22–29)
CREAT SERPL-MCNC: 0.7 MG/DL (ref 0.5–1)
GFR AFRICAN AMERICAN: >60
GFR NON-AFRICAN AMERICAN: >60 ML/MIN/1.73
GLUCOSE BLD-MCNC: 111 MG/DL (ref 74–99)
HCT VFR BLD CALC: 33.4 % (ref 34–48)
HEMOGLOBIN: 10.5 G/DL (ref 11.5–15.5)
MAGNESIUM: 1.9 MG/DL (ref 1.6–2.6)
MCH RBC QN AUTO: 28.7 PG (ref 26–35)
MCHC RBC AUTO-ENTMCNC: 31.4 % (ref 32–34.5)
MCV RBC AUTO: 91.3 FL (ref 80–99.9)
PDW BLD-RTO: 13.6 FL (ref 11.5–15)
PLATELET # BLD: 182 E9/L (ref 130–450)
PMV BLD AUTO: 11.1 FL (ref 7–12)
POTASSIUM SERPL-SCNC: 3.7 MMOL/L (ref 3.5–5)
RBC # BLD: 3.66 E12/L (ref 3.5–5.5)
SODIUM BLD-SCNC: 139 MMOL/L (ref 132–146)
WBC # BLD: 12 E9/L (ref 4.5–11.5)

## 2020-08-30 PROCEDURE — 6360000002 HC RX W HCPCS: Performed by: NEUROLOGICAL SURGERY

## 2020-08-30 PROCEDURE — 80048 BASIC METABOLIC PNL TOTAL CA: CPT

## 2020-08-30 PROCEDURE — 6370000000 HC RX 637 (ALT 250 FOR IP): Performed by: NURSE PRACTITIONER

## 2020-08-30 PROCEDURE — 85027 COMPLETE CBC AUTOMATED: CPT

## 2020-08-30 PROCEDURE — 36415 COLL VENOUS BLD VENIPUNCTURE: CPT

## 2020-08-30 PROCEDURE — 2580000003 HC RX 258: Performed by: NURSE PRACTITIONER

## 2020-08-30 PROCEDURE — 6370000000 HC RX 637 (ALT 250 FOR IP): Performed by: NEUROLOGICAL SURGERY

## 2020-08-30 PROCEDURE — 83735 ASSAY OF MAGNESIUM: CPT

## 2020-08-30 PROCEDURE — APPSS60 APP SPLIT SHARED TIME 46-60 MINUTES: Performed by: NURSE PRACTITIONER

## 2020-08-30 PROCEDURE — 2060000000 HC ICU INTERMEDIATE R&B

## 2020-08-30 PROCEDURE — 2580000003 HC RX 258: Performed by: NEUROLOGICAL SURGERY

## 2020-08-30 RX ORDER — LEVETIRACETAM 500 MG/1
500 TABLET ORAL 2 TIMES DAILY
Status: DISCONTINUED | OUTPATIENT
Start: 2020-08-30 | End: 2020-09-11 | Stop reason: HOSPADM

## 2020-08-30 RX ADMIN — ENALAPRIL MALEATE 10 MG: 10 TABLET ORAL at 09:01

## 2020-08-30 RX ADMIN — ATORVASTATIN CALCIUM 80 MG: 80 TABLET, FILM COATED ORAL at 20:12

## 2020-08-30 RX ADMIN — Medication 10 ML: at 20:14

## 2020-08-30 RX ADMIN — LEVETIRACETAM 500 MG: 500 TABLET ORAL at 20:12

## 2020-08-30 RX ADMIN — HYDROCODONE BITARTRATE AND ACETAMINOPHEN 1 TABLET: 5; 325 TABLET ORAL at 01:30

## 2020-08-30 RX ADMIN — POTASSIUM BICARBONATE 20 MEQ: 782 TABLET, EFFERVESCENT ORAL at 09:01

## 2020-08-30 RX ADMIN — MAGNESIUM GLUCONATE 500 MG ORAL TABLET 400 MG: 500 TABLET ORAL at 09:07

## 2020-08-30 RX ADMIN — METOPROLOL SUCCINATE 50 MG: 50 TABLET, EXTENDED RELEASE ORAL at 09:00

## 2020-08-30 RX ADMIN — LEVETIRACETAM 500 MG: 5 INJECTION INTRAVENOUS at 05:15

## 2020-08-30 RX ADMIN — Medication 10 ML: at 09:08

## 2020-08-30 ASSESSMENT — PAIN DESCRIPTION - ONSET: ONSET: AWAKENED FROM SLEEP

## 2020-08-30 ASSESSMENT — PAIN DESCRIPTION - LOCATION: LOCATION: HEAD

## 2020-08-30 ASSESSMENT — PAIN DESCRIPTION - PAIN TYPE: TYPE: SURGICAL PAIN

## 2020-08-30 ASSESSMENT — PAIN SCALES - GENERAL
PAINLEVEL_OUTOF10: 2
PAINLEVEL_OUTOF10: 0
PAINLEVEL_OUTOF10: 3

## 2020-08-30 ASSESSMENT — PAIN - FUNCTIONAL ASSESSMENT: PAIN_FUNCTIONAL_ASSESSMENT: ACTIVITIES ARE NOT PREVENTED

## 2020-08-30 ASSESSMENT — PAIN DESCRIPTION - PROGRESSION: CLINICAL_PROGRESSION: NOT CHANGED

## 2020-08-30 ASSESSMENT — PAIN DESCRIPTION - FREQUENCY: FREQUENCY: INTERMITTENT

## 2020-08-30 NOTE — PROGRESS NOTES
troponin  Resolved Problems:    * No resolved hospital problems. *          Neuro:  S/p crani removal of meningioma and cyst. Hx Brain aneurysm. Neurosurgery following. Keppra. .  Psych:No acute issues. Hx Paranoid psychosis. Recent suicidal ideations. Admit to inpatient psych when medically stable. CV: No acute issues. HX HTN  SBP goal <140 mm Hg. Lipitor, enalapril, Metoprolol. PRN Hydralazine. Pulm: No acute issues. GI: Diet. Monitor bowel function  Renal:  Low normal potassium and magnesium. Supplement. Monitor uop, renal function and electrolytes  ID: Leukocytosis. Afebrile. Endocrine:  Hyperglycemia. Follow labs    MSK:. Deconditioned. PT/OT  Heme: No acute issues. Bowel regime: Glycolax  Pain control/Sedation:  Norco  DVT prophylaxis: SCDs. GI prophylaxis: Diet  Glucose protocol: Follow labs  Mouth/Eye care: As needed. Consults:   Medicine, psych, neurosurgery  Patient/Family update: Will update as family available  Code status:  Full      Disposition:  Transfer from NSICU. Leora Clarke DNP.  APRN-CNP  8/30/2020  10:42 AM

## 2020-08-30 NOTE — PROGRESS NOTES
Hospital Medicine Progress Note      Date of Admission: 8/23/2020  Hospital day # 7    Elevated troponin likely due to demand ischemia secondary to elevated blood pressure. 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 Psychosis    Subjective  No acute events overnight  Exam:    BP (!) 159/79   Pulse 83   Temp 98.2 °F (36.8 °C) (Temporal)   Resp 16   Ht 5' 3\" (1.6 m)   Wt 199 lb 12.8 oz (90.6 kg)   SpO2 92%   BMI 35.39 kg/m²     General appearance: No apparent distress, appears stated age and cooperative. HEENT: Normocephalic, atraumatic, facial features symmetrical.  EOMI. Surgical dressing in place  Neck: Supple. No jugular venous distention. Trachea midline. Respiratory:  Normal respiratory effort. Clear to auscultation, bilaterally without Rales/Wheezes/Rhonchi. Cardiovascular: S1/S2   Abdomen: Soft, non-tender, non-distended with normal bowel sounds. Musculoskeletal: No clubbing, cyanosis or edema bilaterally.    Skin:  No rashes    Neurologic: awake, alert and following commands     Medications:  Reviewed    Infusion Medications     Scheduled Medications    potassium bicarb-citric acid  20 mEq Oral Daily    magnesium oxide  400 mg Oral Daily    levetiracetam  500 mg Intravenous Q12H    sodium chloride flush  10 mL Intravenous 2 times per day    atorvastatin  80 mg Oral Nightly    enalapril  10 mg Oral Daily    metoprolol succinate  50 mg Oral Daily     PRN Meds: HYDROcodone 5 mg - acetaminophen, sodium chloride flush, acetaminophen **OR** [DISCONTINUED] acetaminophen, polyethylene glycol, [DISCONTINUED] promethazine **OR** ondansetron, hydrALAZINE    I/O    Intake/Output Summary (Last 24 hours) at 8/30/2020 0846  Last data filed at 8/30/2020 0230  Gross per 24 hour   Intake 1020 ml   Output 800 ml   Net 220 ml       Labs:   Recent Labs     08/28/20  0535 08/28/20  1810 08/29/20  0329 08/30/20  0241   WBC 10.9  --  14.3* 12.0*   HGB 14.5 12.1 11.5 10.5* HCT 45.5 37.0 35.6 33.4*     --  191 182       Recent Labs     08/28/20  0535  08/28/20  1343 08/29/20  0329 08/30/20  0241     --   --  142 139   K 4.2   < > 4.1 3.9 3.7     --   --  107 103   CO2 23  --   --  23 28   BUN 17  --   --  19 13   CREATININE 0.7  --   --  0.8 0.7   CALCIUM 9.6  --   --  8.1* 8.2*    < > = values in this interval not displayed. No results for input(s): PROT, ALB, ALKPHOS, ALT, AST, BILITOT, AMYLASE, LIPASE in the last 72 hours. No results for input(s): INR in the last 72 hours. No results for input(s): [place] in the last 72 hours. Other labs:  Lab Results   Component Value Date    CHOL 182 08/24/2020    TRIG 133 08/24/2020    HDL 44 08/24/2020    LDLCALC 111 (H) 08/24/2020    TSH 1.552 10/12/2010    INR 1.1 08/23/2020    LABA1C 5.6 05/11/2017       ASSESSMENT:  -Enlarging parasagittal meningioma causing mass-effect in the left occipital lobe  -POD # 2 s/p craniotomy, excision large meningioma, excision of large sebaceous cyst left occipital scalp  -Elevated troponins  -Psychosis  -Hypertension  -History of brain aneurysm       PLAN:  Neurosurgery following  Intensivist following  Keppra 500 mg IV twice daily  Hydralazine IV prn SBP >150  Continue Toprol  Continue Enalapril  Admit to behavioral health unit when medically stable      Diet: DIET GENERAL; No Caffeine  Code Status: Full Code    PT/OT Eval Status: [] Ordered [] Evaluation noted [x] Not applicable    DVT Prophylaxis: []Lovenox []Heparin [x]PCD [] [address] []Encouraged ambulation []N/A    Likely disposition when able:  []Home [] Home with [facility] [] SNF/LOLLY [] Acute Rehab [] LTAC [x]Other    +++++++++++++++++++++++++++++++++++++++++++++++++  Greg Davis DO, Hospitalist  +++++++++++++++++++++++++++++++++++++++++++++++++  NOTE: This report was transcribed using voice recognition software.  Every effort was made to ensure accuracy; however, inadvertent computerized transcription errors may be present.

## 2020-08-30 NOTE — PROGRESS NOTES
Comprehensive Nutrition Assessment    Type and Reason for Visit:  Initial(LOS)    Nutrition Recommendations/Plan: Recommend and start Ensure High Protein supplement BID and Rom wound healing supplement BID to help meet increased nutritional needs from surgical wound healing. Nutrition Assessment:  Patient at nutritional risk d/t overall decreased po intake x 1 week since adm (25-50%) ; pt at further nutritional compromise AEB increased needs from surgical wound healing (s/p crani 8/28) ; pt also adm w/ NSTEMI ; will start ONS    Malnutrition Assessment:  Malnutrition Status: At risk for malnutrition (Comment)    Context:  Acute Illness     Findings of the 6 clinical characteristics of malnutrition:  Energy Intake:  1 - 75% or less of estimated energy requirements for 7 or more days  Weight Loss:  Unable to assess(d/t lack of wt hx)     Body Fat Loss:  Unable to assess(data not available to assess at this time)     Muscle Mass Loss:  Unable to assess(data not available to assess at this time)    Fluid Accumulation:  No significant fluid accumulation     Strength:  Not Performed    Estimated Daily Nutrient Needs:  Energy (kcal):  0374-4237 (REE 1434 x 1.2 SF); Weight Used for Energy Requirements:  Current     Protein (g):  75-95 (1.5-1.8g/kg IBW);  Weight Used for Protein Requirements:  Ideal        Fluid (ml/day):  7475-9971; Weight Used for Fluid Requirements:  [place]      Nutrition Related Findings:  +I&Os (+1.6 L), 1+ edema, active BS, A&O x 4, hemorrhoids, abrasions      Wounds:  Surgical Wound(Incision x 1 noted to head)       Current Nutrition Therapies:    DIET GENERAL; No Caffeine    Anthropometric Measures:  · Height: 5' 3\" (160 cm)  · Current Body Weight: 199 lb (90.3 kg)(8/28/20, no method)   · Admission Body Weight: 201 lb (91.2 kg)(8/24/20, no method)    · Usual Body Weight: (UTO ; no weights available in EMR history from previous encounters)     · Ideal Body Weight: 115 lbs; % Ideal Body Weight 173 %   · BMI: 35.3  · BMI Categories: Obese Class 2 (BMI 35.0 -39.9)       Nutrition Diagnosis:   · Increased nutrient needs related to increase demand for energy/nutrients as evidenced by wounds      Nutrition Interventions:   Food and/or Nutrient Delivery:  Continue Current Diet, Start Oral Nutrition Supplement  Nutrition Education/Counseling:  Education not indicated   Coordination of Nutrition Care:  Continued Inpatient Monitoring    Goals:  Patient will consume ~75% of meals served       Nutrition Monitoring and Evaluation:   Behavioral-Environmental Outcomes:  Knowledge or Skill   Food/Nutrient Intake Outcomes:  Food and Nutrient Intake, Supplement Intake  Physical Signs/Symptoms Outcomes:  Biochemical Data, Chewing or Swallowing, Fluid Status or Edema, Hemodynamic Status, Meal Time Behavior, Nutrition Focused Physical Findings, Skin, Weight     Discharge Planning:    Continue current diet     Electronically signed by Shiva Sandoval RD, LD on 8/30/20 at 11:59 AM EDT    Contact: [phone]

## 2020-08-30 NOTE — PLAN OF CARE
Problem: Falls - Risk of:  Goal: Will remain free from falls  Description: Will remain free from falls  8/30/2020 0639 by Nguyen Valadez RN  Outcome: Ongoing     Problem: Falls - Risk of:  Goal: Absence of physical injury  Description: Absence of physical injury  8/30/2020 3455 by Nguyen Valadez RN  Outcome: Ongoing     Problem: Skin Integrity:  Goal: Will show no infection signs and symptoms  Description: Will show no infection signs and symptoms  8/30/2020 0639 by Nguyen Valadez RN  Outcome: Ongoing     Problem: Skin Integrity:  Goal: Absence of new skin breakdown  Description: Absence of new skin breakdown  8/30/2020 0639 by Nguyen Valadez RN  Outcome: Ongoing     Problem: Pain:  Goal: Pain level will decrease  Description: Pain level will decrease  8/30/2020 4061 by Nguyen Valadez RN  Outcome: Ongoing     Problem: Pain:  Goal: Control of acute pain  Description: Control of acute pain  8/30/2020 0639 by Nguyen Valadez RN  Outcome: Ongoing

## 2020-08-31 ENCOUNTER — APPOINTMENT (OUTPATIENT)
Dept: CT IMAGING | Age: 60
DRG: 025 | End: 2020-08-31
Payer: COMMERCIAL

## 2020-08-31 LAB
ANION GAP SERPL CALCULATED.3IONS-SCNC: 12 MMOL/L (ref 7–16)
BUN BLDV-MCNC: 11 MG/DL (ref 8–23)
CALCIUM SERPL-MCNC: 8.5 MG/DL (ref 8.6–10.2)
CHLORIDE BLD-SCNC: 99 MMOL/L (ref 98–107)
CO2: 27 MMOL/L (ref 22–29)
CREAT SERPL-MCNC: 0.5 MG/DL (ref 0.5–1)
GFR AFRICAN AMERICAN: >60
GFR NON-AFRICAN AMERICAN: >60 ML/MIN/1.73
GLUCOSE BLD-MCNC: 110 MG/DL (ref 74–99)
HCT VFR BLD CALC: 32.9 % (ref 34–48)
HEMOGLOBIN: 10.4 G/DL (ref 11.5–15.5)
MAGNESIUM: 2.3 MG/DL (ref 1.6–2.6)
MCH RBC QN AUTO: 29.1 PG (ref 26–35)
MCHC RBC AUTO-ENTMCNC: 31.6 % (ref 32–34.5)
MCV RBC AUTO: 91.9 FL (ref 80–99.9)
PDW BLD-RTO: 13.2 FL (ref 11.5–15)
PLATELET # BLD: 205 E9/L (ref 130–450)
PMV BLD AUTO: 11.9 FL (ref 7–12)
POTASSIUM SERPL-SCNC: 3.9 MMOL/L (ref 3.5–5)
RBC # BLD: 3.58 E12/L (ref 3.5–5.5)
SODIUM BLD-SCNC: 138 MMOL/L (ref 132–146)
WBC # BLD: 12.8 E9/L (ref 4.5–11.5)

## 2020-08-31 PROCEDURE — U0003 INFECTIOUS AGENT DETECTION BY NUCLEIC ACID (DNA OR RNA); SEVERE ACUTE RESPIRATORY SYNDROME CORONAVIRUS 2 (SARS-COV-2) (CORONAVIRUS DISEASE [COVID-19]), AMPLIFIED PROBE TECHNIQUE, MAKING USE OF HIGH THROUGHPUT TECHNOLOGIES AS DESCRIBED BY CMS-2020-01-R: HCPCS

## 2020-08-31 PROCEDURE — 97162 PT EVAL MOD COMPLEX 30 MIN: CPT

## 2020-08-31 PROCEDURE — 2580000003 HC RX 258: Performed by: NURSE PRACTITIONER

## 2020-08-31 PROCEDURE — 36415 COLL VENOUS BLD VENIPUNCTURE: CPT

## 2020-08-31 PROCEDURE — 97535 SELF CARE MNGMENT TRAINING: CPT

## 2020-08-31 PROCEDURE — 83735 ASSAY OF MAGNESIUM: CPT

## 2020-08-31 PROCEDURE — 2060000000 HC ICU INTERMEDIATE R&B

## 2020-08-31 PROCEDURE — 97530 THERAPEUTIC ACTIVITIES: CPT

## 2020-08-31 PROCEDURE — 6370000000 HC RX 637 (ALT 250 FOR IP): Performed by: NURSE PRACTITIONER

## 2020-08-31 PROCEDURE — 85027 COMPLETE CBC AUTOMATED: CPT

## 2020-08-31 PROCEDURE — 2700000000 HC OXYGEN THERAPY PER DAY

## 2020-08-31 PROCEDURE — 97166 OT EVAL MOD COMPLEX 45 MIN: CPT

## 2020-08-31 PROCEDURE — 80048 BASIC METABOLIC PNL TOTAL CA: CPT

## 2020-08-31 PROCEDURE — 70450 CT HEAD/BRAIN W/O DYE: CPT

## 2020-08-31 RX ADMIN — ENALAPRIL MALEATE 10 MG: 10 TABLET ORAL at 09:02

## 2020-08-31 RX ADMIN — Medication 10 ML: at 09:02

## 2020-08-31 RX ADMIN — Medication 10 ML: at 20:12

## 2020-08-31 RX ADMIN — MAGNESIUM GLUCONATE 500 MG ORAL TABLET 400 MG: 500 TABLET ORAL at 09:02

## 2020-08-31 RX ADMIN — METOPROLOL SUCCINATE 50 MG: 50 TABLET, EXTENDED RELEASE ORAL at 09:02

## 2020-08-31 RX ADMIN — ATORVASTATIN CALCIUM 80 MG: 80 TABLET, FILM COATED ORAL at 20:11

## 2020-08-31 RX ADMIN — LEVETIRACETAM 500 MG: 500 TABLET ORAL at 20:11

## 2020-08-31 RX ADMIN — LEVETIRACETAM 500 MG: 500 TABLET ORAL at 09:02

## 2020-08-31 RX ADMIN — POTASSIUM BICARBONATE 20 MEQ: 782 TABLET, EFFERVESCENT ORAL at 09:02

## 2020-08-31 ASSESSMENT — PAIN SCALES - GENERAL
PAINLEVEL_OUTOF10: 0

## 2020-08-31 NOTE — CARE COORDINATION
AIMEE spoke with  Orlene Cowden regarding transition of care. He states he would like for her to go to [facility]. Referral made to [facility], await therapy evals and acceptance from [facility], will need a precert if accepted.

## 2020-08-31 NOTE — PROGRESS NOTES
Physical Therapy  Physical Therapy Initial Assessment   Name: Efraín Morning  : 1960  MRN: [id number]    Referring Provider:  REAGAN Romero CNP     Date of Service: 2020    Evaluating PT:  Flaquita Tripp PT, DPT [id number]    Room #:  1655/0852-A  Diagnosis:  NSTEMI  PMHx/PSHx:  HTN, brain aneurysm, lumbar herniated disc  Procedure/Surgery:  Frameless stereotactic left parietooccipital craniotomy for the  excision of large meningioma. 2020   2. Excision of large sebaceous cyst, left occipital scalp. 2020   Precautions:  Falls, confusion, severely visually impaired (possibly blind), lethargic  Equipment Needs:  TBD closer to discharge    SUBJECTIVE:  Pt unable to provide social history at this time. Pt's brother, Sarai Jason, present during evaluation and reports that pt lives alone in an apartment in deplorable conditions. Pt ambulated with no AD independently PTA. OBJECTIVE:   Initial Evaluation  Date: 2020 Treatment Short Term/ Long Term   Goals   AM-PAC 6 Clicks      Was pt agreeable to Eval/treatment? yes     Does pt have pain? No c/o pain. Bed Mobility  Rolling: maxA  Supine to sit: maxA  Sit to supine: maxA  Scooting: maxA  Rolling: Leonardo  Supine to sit: Leonardo  Sit to supine: Leonardo  Scooting: Leonardo   Transfers Sit to stand: modA  Stand to sit: modA  Stand pivot: NT, pt unable to advance BLE  Sit to stand: Leonardo  Stand to sit: Leonardo  Stand pivot: Leonardo front [employer]   Ambulation    NT  25 feet with front [employer] negotiation: ascended and descended  NT  NA at this time   ROM BUE:  Per OT note  BLE:  WNL     Strength BUE:  Per OT note  BLE:  Not formally assessed d/t pt's cognition     Balance Sitting EOB:  Leonardo  Dynamic Standing:  NT  Static standing: maxA with front [employer]  Sitting EOB:  SBA  Dynamic Standing:  Leonardo front [employer]     Pt is A & O x 1. Oriented to self only. Pt perseverates on \"I'm trying to focus\".   Sensation:  Pt denies numbness and tingling to extremities  Edema:  Unremarkable  Vision:  Pt demonstrating no protective reflexes of incoming objects to eyes. No visual tracking present. Pt reports she can see but is not able to locate therapist's hands or report number of fingers held up. Vitals:  Spo2 monitored throughout session:  1L NC 95%  RA 90% at rest  1L NC 94% with activity    Patient education  Pt educated on role of PT intervention. Pt educated on safety in room with utilization of call light for assistance with mobility. Patient response to education:   Pt verbalized understanding Pt demonstrated skill Pt requires further education in this area   yes yes yes     ASSESSMENT:    Comments:  RN cleared pt for activity prior to session. Pt received supine in bed and agreeable to PT intervention with OT collaboration at this time. Pt performed all functional mobility as noted above. Pt functioning well below baseline s/p craniotomy for removal of meningioma. Pt presenting with severely impaired vision at this time as well. Pt lethargic throughout session but following simple 1 step commands consistently. In standing pt unable to advance BLE or maintain static standing position. Pt instructed on lateral scoots towards [facility] but required physical assistance to advance herself laterally. At end of session, pt returned to supine and left with all needs met and call light in reach. Pt requires continued skilled PT intervention for the purposes of maximizing functional mobility and independence by addressing mobility deficits described above. Treatment:  Patient practiced and was instructed in the following treatment:     Therapeutic Activities Completed:  o Functional mobility as noted above:   - Bed mobility: maxA to complete. Hand over hand assistance provided to encourage utilization of bed rail. Max VC provided to address sequencing of log roll technique for ease of transition for pt.   At EOB pt sat x 15 minutes at Leonardo level working to improve trunk stability and to improve level of alertness.   - Transfer training: sit<>Stand x 3 reps with modA to front [employer]. Pt stands with forward flexed posture and was unable to advance BLE for transfers. o Skilled repositioning in supine with HOB elevated and BLE propped on pillow for pressure relief. o Pt education as noted above.  o Skilled vitals monitoring as noted above. Pt's/ family goals   1. Pt does not state goal.  Brother expresses wishes for continued rehabilitation to improve pt's level of function. Patient and or family understand(s) diagnosis, prognosis, and plan of care. yes    PLAN:    PT care will be provided in accordance with the objectives noted above. Exercises and functional mobility practice will be used as well as appropriate assistive devices or modalities to obtain goals. Patient and family education will also be administered as needed. Frequency of treatments: 2-5x/week x 1-2 weeks. Time in  0955  Time out  1017    Total Treatment Time  15 minutes     Evaluation Time includes thorough review of current medical information, gathering information on past medical history/social history and prior level of function, completion of standardized testing/informal observation of tasks, assessment of data and education on plan of care and goals.     CPT codes:  [] Low Complexity PT evaluation 21960  [x] Moderate Complexity PT evaluation 56847  [] High Complexity PT evaluation 66153  [] PT Re-evaluation 36900  [] Gait training 27837 0 minutes  [] Manual therapy 35822 0 minutes  [x] Therapeutic activities 45939 15 minutes  [] Therapeutic exercises 61950 0 minutes  [] Neuromuscular reeducation 28242 0 minutes     Catalina Tavera PT, DPT  [id number]

## 2020-08-31 NOTE — PLAN OF CARE
Problem: Falls - Risk of:  Goal: Will remain free from falls  Description: Will remain free from falls  Outcome: Met This Shift  Goal: Absence of physical injury  Description: Absence of physical injury  Outcome: Met This Shift     Problem: Skin Integrity:  Goal: Will show no infection signs and symptoms  Description: Will show no infection signs and symptoms  Outcome: Met This Shift  Goal: Absence of new skin breakdown  Description: Absence of new skin breakdown  Outcome: Met This Shift     Problem: Pain:  Goal: Pain level will decrease  Description: Pain level will decrease  Outcome: Met This Shift  Goal: Control of acute pain  Description: Control of acute pain  Outcome: Met This Shift  Goal: Control of chronic pain  Description: Control of chronic pain  Outcome: Met This Shift        Problem: Airway Clearance - Ineffective:  Goal: Ability to maintain a clear airway will improve  Description: Ability to maintain a clear airway will improve  Outcome: Met This Shift

## 2020-08-31 NOTE — CARE COORDINATION
Spoke with [facility] will accept patient. They will start precert once OT note in computer. Await auth. Envelope and ambulance form on soft chart.

## 2020-08-31 NOTE — PROGRESS NOTES
Upon assessment, patient's pupils reactive to light only. Patient unable to follow commands. Patient is unable to address if she can see anything in her visual fields. Possible cortical blindness suspected. Dr. Stoney Knight and Dr. Omayra Robins notified. See new orders.   [address]

## 2020-08-31 NOTE — PROGRESS NOTES
Hospital Medicine Progress Note      Date of Admission: 8/23/2020  Hospital day # 8    Elevated troponin likely due to demand ischemia secondary to elevated blood pressure. 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 Psychosis    Subjective  No acute events overnight  Out of ICU overnight  Lethargic difficult to arouse    Exam:    BP (!) 155/88   Pulse 82   Temp 99.7 °F (37.6 °C) (Temporal)   Resp 14   Ht 5' 3\" (1.6 m)   Wt 199 lb 12.8 oz (90.6 kg)   SpO2 96%   BMI 35.39 kg/m²     General appearance: Lethargic, difficult to arouse  HEENT: Normocephalic, atraumatic, facial features symmetrical.  EOMI. Surgical dressing in place  Neck: Supple. No jugular venous distention. Trachea midline. Respiratory:  Normal respiratory effort. Clear to auscultation, bilaterally without Rales/Wheezes/Rhonchi. Cardiovascular: S1/S2   Abdomen: Soft, non-tender, non-distended with normal bowel sounds. Musculoskeletal: No clubbing, cyanosis or edema bilaterally.    Skin:  No rashes    Neurologic: Difficult to arouse    Medications:  Reviewed    Infusion Medications     Scheduled Medications    levETIRAcetam  500 mg Oral BID    potassium bicarb-citric acid  20 mEq Oral Daily    magnesium oxide  400 mg Oral Daily    sodium chloride flush  10 mL Intravenous 2 times per day    atorvastatin  80 mg Oral Nightly    enalapril  10 mg Oral Daily    metoprolol succinate  50 mg Oral Daily     PRN Meds: HYDROcodone 5 mg - acetaminophen, sodium chloride flush, acetaminophen **OR** [DISCONTINUED] acetaminophen, polyethylene glycol, [DISCONTINUED] promethazine **OR** ondansetron, hydrALAZINE    I/O    Intake/Output Summary (Last 24 hours) at 8/31/2020 1043  Last data filed at 8/31/2020 0714  Gross per 24 hour   Intake 100 ml   Output --   Net 100 ml       Labs:   Recent Labs     08/29/20  0329 08/30/20  0241 08/31/20  0622   WBC 14.3* 12.0* 12.8*   HGB 11.5 10.5* 10.4*   HCT 35.6 33.4* 32.9*  182 205       Recent Labs     08/29/20  0329 08/30/20  0241 08/31/20  0622    139 138   K 3.9 3.7 3.9    103 99   CO2 23 28 27   BUN 19 13 11   CREATININE 0.8 0.7 0.5   CALCIUM 8.1* 8.2* 8.5*       No results for input(s): PROT, ALB, ALKPHOS, ALT, AST, BILITOT, AMYLASE, LIPASE in the last 72 hours. No results for input(s): INR in the last 72 hours. No results for input(s): Hamzah Harrison in the last 72 hours. Other labs:  Lab Results   Component Value Date    CHOL 182 08/24/2020    TRIG 133 08/24/2020    HDL 44 08/24/2020    LDLCALC 111 (H) 08/24/2020    TSH 1.552 10/12/2010    INR 1.1 08/23/2020    LABA1C 5.6 05/11/2017       ASSESSMENT:  -Enlarging parasagittal meningioma causing mass-effect in the left occipital lobe  -POD # 3 s/p craniotomy, excision large meningioma, excision of large sebaceous cyst left occipital scalp  -Elevated troponins  -Psychosis  -Hypertension  -History of brain aneurysm       PLAN:  Neurosurgery following  Keppra 500 mg IV twice daily  Hydralazine IV prn SBP >150  Continue Toprol  Continue Enalapril  Case management/social work working on discharge issues      Diet: DIET GENERAL; No Caffeine  Dietary Nutrition Supplements: Low Calorie High Protein Supplement  Dietary Nutrition Supplements: Wound Healing Oral Supplement  Code Status: Full Code    PT/OT Eval Status: [] Ordered [] Evaluation noted [x] Not applicable    DVT Prophylaxis: []Lovenox []Heparin [x]PCD [] [address] []Encouraged ambulation []N/A    Likely disposition when able:  []Home [] Home with [facility] [] SNF/LOLLY [] Acute Rehab [] LTAC [x]Other    +++++++++++++++++++++++++++++++++++++++++++++++++  Seda Garcia, , Hospitalist  +++++++++++++++++++++++++++++++++++++++++++++++++  NOTE: This report was transcribed using voice recognition software.  Every effort was made to ensure accuracy; however, inadvertent computerized transcription errors may be present.

## 2020-08-31 NOTE — PROGRESS NOTES
PO#3  PROCEDURES:  1.  Frameless stereotactic left parietooccipital craniotomy for the  excision of large meningioma. 2.  Excision of large sebaceous cyst, left occipital scalp. Doing well. Was doing well yesterday, but today is obtunded  Hard to arouse  CT scan of brain ordered  Reviewed CT scan of brain today:  1. Large postsurgical defect medial left occipital lobe with    decreasing postop hemorrhages. 2. Decreased postop hematoma in the inferior medial right occipital    lobe with surgical defect in the posterior falx. 3. Increasing size of infarct in the posterior superior right    cerebellum. 4. Left cerebral edema showing a slight interval increase. 5. Old right MCA aneurysm clipping with chronic areas of    encephalomalacia right temporal and parietal lobes. Will get Neurology consult.   Spoke with

## 2020-08-31 NOTE — PROGRESS NOTES
solving:  poor   Judgement/safety:  poor      Functional Assessment:   Initial Eval Status  Date: 8/31/20 Treatment Status  Date: STG/LTG  Frequency/duration  2-3x/week, 5-7 days   Feeding Maximal Assist   *Impaired vision and cognition for all ADLS    Minimal Assist    Grooming Maximal/dep Assist   Minimal Assist    UB Dressing Maximal/dep Assist   Minimal Assist    LB Dressing Dependent   Moderate Assist    Bathing Maximal/dep Assist  Simulated with hand over hand assist  Moderate Assist    Toileting Dependent   Incontinent of urine  Pt verbalized request to go to bathroom but unable to follow directions for bed mobility-  bed pan used for safety. Dependent hygiene. Moderate Assist    Bed Mobility  Supine to sit: Maximal Assist   Sit to supine: Maximal Assist   Supine to sit: Minimal Assist   Sit to supine: Minimal Assist    Functional Transfers Sit to stand: Moderate Assist   Stand to sit: Moderate Assist   Stand pivot: NT   Contact Guard Assist    Functional Mobility  (Ambulation) Pt unable to follow directions for side steps with ww. Min A with ww  Short distance   Balance Sitting:     Static:  SBA to min A    Dynamic:min A  Standing: mod A     Activity Tolerance Poor  Very lethargic  Fair     Visual/  Perceptual Glasses: yes  Appears blind B eyes  No reflexive eyelid closure with stimulus          BUE  ROM/Strength/  Fine motor Coordination RUE: ROM WFL     Strength: grossly 3/5      Strength: fair     Coordination: poor    LUE: ROM  WFL     Strength: grossly 3/5      Strength: fair     Coordination: poor       Hand dominance: R??     Hearing: WFL  Sensation: NT No c/o numbness or tingling  Tone:  WFL  Edema: None noted                          Treatment: OT treatment provided this date includes:    ADL-  Instruction/training on safety and adapted techniques for completion of ADLs: LE dressing    Mobility-  Instruction/training on safety and improved independence with bed mobility/functional transfers     Sitting EOB x 15 minutes to improve dynamic sitting balance and activity tolerance during ADLs.    Cognitive retraining -  Cues for safety during mobility, EOB activity, , max cues for sequencing, problem solving throughout session to improve appropriate interaction and attention.   Visual/Perception-Facilitation of Visual Perceptual Skills for increased safety and independence with ADLs. Appears blind B eyes    Skilled positioning/alignment-  Proper Positioning/Alignment in bed,     Skilled monitoring of O2 sats-   SpO2 at rest 95% on 1L nc, SpO2 during activity 95%, SpO2 at end of session 95%              Comments: Upon arrival, patient in bed. Brother present in room. At end of session, patient in bed, bed alarm reset,  with call light and phone within reach, all lines and tubes intact. Pt demonstrating poor understanding of education/techniques. Patient would benefit from continued skilled OT  to improve safety, functional independence and quality of life.     Assessment of current deficits   Functional mobility [x]  ADLs [x] Strength [x]  Cognition [x]  Functional transfers  [x] IADLs [] Safety Awareness [x]  Endurance [x]  Fine Motor Coordination [x] Balance [x] Vision/perception [x] Sensation []   Gross Motor Coordination [x] ROM [x] Delirium []                  Motor Control []    Plan of Care:   ADL retraining [x]    Equipment needs [x]   Neuromuscular re-education [x]  Energy Conservation Techniques [x]  Functional Transfer training [x]  Patient and/or Family Education [x]  Functional Mobility training [x]              Environmental Modifications [x]  Cognitive re-training [x]    Compensatory techniques for ADLs [x]  Splinting Needs []    Positioning to improve overall function [x]   Therapeutic Activity [x]               Therapeutic Exercise  [x]  Visual/Perceptual: [x]     Delirium prevention/treatment  []   Other:  []    Rehab Potential: Good for established goals    Patient / Family Goal:  Not stated     Evaluation time includes thorough review of current medical information, gathering information on past medical & social history & PLOF, completion of standardized testing, informal observation of tasks, consultation with other medical professions/disciplines, assessment of data & development of POC/goals. · Evaluation Complexity: Mod Complexity  · History: Expanded review of medical records and additional review of physical, cognitive, or psychosocial history related to current functional performance  · Exam: 5+ performance deficits  · Assistance/Modification: mod/max assistance or modifications required to perform tasks. May have comorbidities that affect occupational performance.     Treatment time in/out: 8:30/ 8:40 am for toileting and use of bed pan  Treatment Time In: 10:59            Treatment Time Out:  1117am             Treatment Charges: Mins Units   Ther Ex  [id number]     Manual Therapy [id number]     Thera Activities [id number] 18 1   ADL/Home Mgt [id number] 10 1   Neuro Re-ed 19679     Group Therapy      Orthotic manage/training  [id number]     Non-Billable Time     Total Timed Treatment [address] [place] [address] #[id number]

## 2020-09-01 LAB
ANION GAP SERPL CALCULATED.3IONS-SCNC: 12 MMOL/L (ref 7–16)
BLOOD BANK DISPENSE STATUS: NORMAL
BLOOD BANK DISPENSE STATUS: NORMAL
BLOOD BANK PRODUCT CODE: NORMAL
BLOOD BANK PRODUCT CODE: NORMAL
BPU ID: NORMAL
BPU ID: NORMAL
BUN BLDV-MCNC: 16 MG/DL (ref 8–23)
CALCIUM SERPL-MCNC: 8.5 MG/DL (ref 8.6–10.2)
CHLORIDE BLD-SCNC: 99 MMOL/L (ref 98–107)
CO2: 26 MMOL/L (ref 22–29)
CREAT SERPL-MCNC: 0.6 MG/DL (ref 0.5–1)
DESCRIPTION BLOOD BANK: NORMAL
DESCRIPTION BLOOD BANK: NORMAL
GFR AFRICAN AMERICAN: >60
GFR NON-AFRICAN AMERICAN: >60 ML/MIN/1.73
GLUCOSE BLD-MCNC: 116 MG/DL (ref 74–99)
HCT VFR BLD CALC: 32.3 % (ref 34–48)
HEMOGLOBIN: 10.3 G/DL (ref 11.5–15.5)
MAGNESIUM: 2.2 MG/DL (ref 1.6–2.6)
MCH RBC QN AUTO: 29 PG (ref 26–35)
MCHC RBC AUTO-ENTMCNC: 31.9 % (ref 32–34.5)
MCV RBC AUTO: 91 FL (ref 80–99.9)
PDW BLD-RTO: 13.1 FL (ref 11.5–15)
PLATELET # BLD: 247 E9/L (ref 130–450)
PMV BLD AUTO: 12.1 FL (ref 7–12)
POTASSIUM SERPL-SCNC: 3.9 MMOL/L (ref 3.5–5)
RBC # BLD: 3.55 E12/L (ref 3.5–5.5)
SARS-COV-2: NOT DETECTED
SODIUM BLD-SCNC: 137 MMOL/L (ref 132–146)
SOURCE: NORMAL
WBC # BLD: 13.6 E9/L (ref 4.5–11.5)

## 2020-09-01 PROCEDURE — 36415 COLL VENOUS BLD VENIPUNCTURE: CPT

## 2020-09-01 PROCEDURE — 80048 BASIC METABOLIC PNL TOTAL CA: CPT

## 2020-09-01 PROCEDURE — 6370000000 HC RX 637 (ALT 250 FOR IP): Performed by: NURSE PRACTITIONER

## 2020-09-01 PROCEDURE — 83735 ASSAY OF MAGNESIUM: CPT

## 2020-09-01 PROCEDURE — 2700000000 HC OXYGEN THERAPY PER DAY

## 2020-09-01 PROCEDURE — 85027 COMPLETE CBC AUTOMATED: CPT

## 2020-09-01 PROCEDURE — 6360000002 HC RX W HCPCS: Performed by: NURSE PRACTITIONER

## 2020-09-01 PROCEDURE — 99223 1ST HOSP IP/OBS HIGH 75: CPT | Performed by: PSYCHIATRY & NEUROLOGY

## 2020-09-01 PROCEDURE — 2580000003 HC RX 258: Performed by: NURSE PRACTITIONER

## 2020-09-01 PROCEDURE — 2060000000 HC ICU INTERMEDIATE R&B

## 2020-09-01 RX ADMIN — ATORVASTATIN CALCIUM 80 MG: 80 TABLET, FILM COATED ORAL at 19:42

## 2020-09-01 RX ADMIN — HYDRALAZINE HYDROCHLORIDE 5 MG: 20 INJECTION INTRAMUSCULAR; INTRAVENOUS at 15:29

## 2020-09-01 RX ADMIN — SODIUM CHLORIDE, PRESERVATIVE FREE 10 ML: 5 INJECTION INTRAVENOUS at 15:29

## 2020-09-01 RX ADMIN — METOPROLOL SUCCINATE 50 MG: 50 TABLET, EXTENDED RELEASE ORAL at 08:51

## 2020-09-01 RX ADMIN — ENALAPRIL MALEATE 10 MG: 10 TABLET ORAL at 08:50

## 2020-09-01 RX ADMIN — Medication 10 ML: at 08:51

## 2020-09-01 RX ADMIN — Medication 10 ML: at 19:42

## 2020-09-01 RX ADMIN — POTASSIUM BICARBONATE 20 MEQ: 782 TABLET, EFFERVESCENT ORAL at 08:50

## 2020-09-01 RX ADMIN — MAGNESIUM GLUCONATE 500 MG ORAL TABLET 400 MG: 500 TABLET ORAL at 08:50

## 2020-09-01 RX ADMIN — LEVETIRACETAM 500 MG: 500 TABLET ORAL at 19:42

## 2020-09-01 RX ADMIN — LEVETIRACETAM 500 MG: 500 TABLET ORAL at 08:50

## 2020-09-01 ASSESSMENT — PAIN SCALES - GENERAL
PAINLEVEL_OUTOF10: 0

## 2020-09-01 NOTE — PROGRESS NOTES
PO#3  PROCEDURES:  1.  Frameless stereotactic left parietooccipital craniotomy for the  excision of large meningioma. 2.  Excision of large sebaceous cyst, left occipital scalp. Doing well. Was doing well yesterday, but today is obtunded  Hard to arouse  More responsive today.   Neurology on board  Spoke with family

## 2020-09-01 NOTE — CARE COORDINATION
Per Michael Back at [facility], out of network with [facility]. Have spoken with patients  Silvestre Guerrero at bedside for other choices. He states he is agreeable to [facility], [facility] or [facility]. Referral made to [facility] at [place] [id number] with [facility]. Await acceptance. WET COVID TEST PENDING.

## 2020-09-01 NOTE — CARE COORDINATION
[facility] can accept. They can initiate precert, have not yet heard back from [employer] [facility] [employer] yet, they are husbands first choice. Sent message to [facility] to check acceptance before having [facility] start precert.

## 2020-09-01 NOTE — CARE COORDINATION
AIMEE spoke with [facility], she states no final decision but they are likely going to deny. Have spoken with Jay Edmonds at [facility], they will start Precert. Await auth. HENS, envelope and ambulance form on soft chart.
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
## 2020-09-02 ENCOUNTER — APPOINTMENT (OUTPATIENT)
Dept: CT IMAGING | Age: 60
DRG: 025 | End: 2020-09-02
Payer: COMMERCIAL

## 2020-09-02 LAB
ANION GAP SERPL CALCULATED.3IONS-SCNC: 14 MMOL/L (ref 7–16)
BUN BLDV-MCNC: 14 MG/DL (ref 8–23)
CALCIUM SERPL-MCNC: 8.6 MG/DL (ref 8.6–10.2)
CHLORIDE BLD-SCNC: 94 MMOL/L (ref 98–107)
CO2: 26 MMOL/L (ref 22–29)
CREAT SERPL-MCNC: 0.5 MG/DL (ref 0.5–1)
GFR AFRICAN AMERICAN: >60
GFR NON-AFRICAN AMERICAN: >60 ML/MIN/1.73
GLUCOSE BLD-MCNC: 99 MG/DL (ref 74–99)
HCT VFR BLD CALC: 33.5 % (ref 34–48)
HEMOGLOBIN: 10.8 G/DL (ref 11.5–15.5)
MAGNESIUM: 2.1 MG/DL (ref 1.6–2.6)
MCH RBC QN AUTO: 28.9 PG (ref 26–35)
MCHC RBC AUTO-ENTMCNC: 32.2 % (ref 32–34.5)
MCV RBC AUTO: 89.6 FL (ref 80–99.9)
PDW BLD-RTO: 13.1 FL (ref 11.5–15)
PLATELET # BLD: 271 E9/L (ref 130–450)
PMV BLD AUTO: 11.8 FL (ref 7–12)
POTASSIUM SERPL-SCNC: 4 MMOL/L (ref 3.5–5)
RBC # BLD: 3.74 E12/L (ref 3.5–5.5)
SODIUM BLD-SCNC: 134 MMOL/L (ref 132–146)
WBC # BLD: 11.9 E9/L (ref 4.5–11.5)

## 2020-09-02 PROCEDURE — 83735 ASSAY OF MAGNESIUM: CPT

## 2020-09-02 PROCEDURE — 80048 BASIC METABOLIC PNL TOTAL CA: CPT

## 2020-09-02 PROCEDURE — 6370000000 HC RX 637 (ALT 250 FOR IP): Performed by: NURSE PRACTITIONER

## 2020-09-02 PROCEDURE — 6360000004 HC RX CONTRAST MEDICATION: Performed by: RADIOLOGY

## 2020-09-02 PROCEDURE — 99233 SBSQ HOSP IP/OBS HIGH 50: CPT | Performed by: NURSE PRACTITIONER

## 2020-09-02 PROCEDURE — 70496 CT ANGIOGRAPHY HEAD: CPT

## 2020-09-02 PROCEDURE — 2060000000 HC ICU INTERMEDIATE R&B

## 2020-09-02 PROCEDURE — 2700000000 HC OXYGEN THERAPY PER DAY

## 2020-09-02 PROCEDURE — 2580000003 HC RX 258: Performed by: NURSE PRACTITIONER

## 2020-09-02 PROCEDURE — 36415 COLL VENOUS BLD VENIPUNCTURE: CPT

## 2020-09-02 PROCEDURE — 85027 COMPLETE CBC AUTOMATED: CPT

## 2020-09-02 RX ORDER — SODIUM CHLORIDE 0.9 % (FLUSH) 0.9 %
10 SYRINGE (ML) INJECTION ONCE
Status: DISCONTINUED | OUTPATIENT
Start: 2020-09-02 | End: 2020-09-11 | Stop reason: HOSPADM

## 2020-09-02 RX ADMIN — Medication 10 ML: at 21:36

## 2020-09-02 RX ADMIN — POTASSIUM BICARBONATE 20 MEQ: 782 TABLET, EFFERVESCENT ORAL at 09:11

## 2020-09-02 RX ADMIN — Medication 10 ML: at 09:11

## 2020-09-02 RX ADMIN — ATORVASTATIN CALCIUM 80 MG: 80 TABLET, FILM COATED ORAL at 21:34

## 2020-09-02 RX ADMIN — LEVETIRACETAM 500 MG: 500 TABLET ORAL at 09:11

## 2020-09-02 RX ADMIN — ENALAPRIL MALEATE 10 MG: 10 TABLET ORAL at 09:11

## 2020-09-02 RX ADMIN — LEVETIRACETAM 500 MG: 500 TABLET ORAL at 21:34

## 2020-09-02 RX ADMIN — IOPAMIDOL 60 ML: 755 INJECTION, SOLUTION INTRAVENOUS at 20:02

## 2020-09-02 RX ADMIN — MAGNESIUM GLUCONATE 500 MG ORAL TABLET 400 MG: 500 TABLET ORAL at 09:11

## 2020-09-02 RX ADMIN — METOPROLOL SUCCINATE 50 MG: 50 TABLET, EXTENDED RELEASE ORAL at 09:11

## 2020-09-02 ASSESSMENT — PAIN DESCRIPTION - PAIN TYPE
TYPE: SURGICAL PAIN
TYPE: SURGICAL PAIN

## 2020-09-02 ASSESSMENT — PAIN SCALES - GENERAL
PAINLEVEL_OUTOF10: 0
PAINLEVEL_OUTOF10: 3
PAINLEVEL_OUTOF10: 8

## 2020-09-02 ASSESSMENT — PAIN DESCRIPTION - PROGRESSION
CLINICAL_PROGRESSION: NOT CHANGED
CLINICAL_PROGRESSION: NOT CHANGED

## 2020-09-02 ASSESSMENT — PAIN DESCRIPTION - FREQUENCY
FREQUENCY: INTERMITTENT
FREQUENCY: INTERMITTENT

## 2020-09-02 ASSESSMENT — PAIN - FUNCTIONAL ASSESSMENT
PAIN_FUNCTIONAL_ASSESSMENT: ACTIVITIES ARE NOT PREVENTED
PAIN_FUNCTIONAL_ASSESSMENT: ACTIVITIES ARE NOT PREVENTED

## 2020-09-02 ASSESSMENT — PAIN DESCRIPTION - LOCATION
LOCATION: HEAD
LOCATION: HEAD

## 2020-09-02 ASSESSMENT — PAIN DESCRIPTION - ORIENTATION
ORIENTATION: LEFT
ORIENTATION: ANTERIOR

## 2020-09-02 ASSESSMENT — PAIN DESCRIPTION - ONSET
ONSET: ON-GOING
ONSET: ON-GOING

## 2020-09-02 ASSESSMENT — PAIN DESCRIPTION - DESCRIPTORS
DESCRIPTORS: HEADACHE;CONSTANT
DESCRIPTORS: DISCOMFORT;SORE

## 2020-09-02 NOTE — CARE COORDINATION
SW spoke with Pt and Pt's Brother about [address] per their request. Pt is interested in changing [facility] Agent to Brother or Sister d/t pending divorce from . SW gave form to Brother so that Pt and him can discuss what Pt wants. Brother is going to talk with their Sister also to make sure everything is filled out correctly. SW will be available for any questions. Discharge Plan is for Pt to go to [facility]. when medically stable for discharge. Previous Staff completed HENS, Envelop, and ambulance form. See soft chart. Sw/CM to follow for discharge needs.    Nikko Brooks, L.S.W.  [phone]
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
## 2020-09-03 PROBLEM — Z98.890 S/P CRANIOTOMY: Status: ACTIVE | Noted: 2020-09-03

## 2020-09-03 PROBLEM — I63.9 CEREBELLAR STROKE (HCC): Status: ACTIVE | Noted: 2020-09-03

## 2020-09-03 PROBLEM — Z98.890 S/P RESECTION OF MENINGIOMA: Status: ACTIVE | Noted: 2020-09-03

## 2020-09-03 PROBLEM — Z86.018 S/P RESECTION OF MENINGIOMA: Status: ACTIVE | Noted: 2020-09-03

## 2020-09-03 PROBLEM — I72.5 BASILAR ARTERY ANEURYSM (HCC): Status: ACTIVE | Noted: 2020-09-03

## 2020-09-03 PROBLEM — I21.4 NON-ST ELEVATION MI (NSTEMI) (HCC): Status: RESOLVED | Noted: 2020-08-23 | Resolved: 2020-09-03

## 2020-09-03 LAB
ANION GAP SERPL CALCULATED.3IONS-SCNC: 14 MMOL/L (ref 7–16)
BUN BLDV-MCNC: 25 MG/DL (ref 8–23)
CALCIUM SERPL-MCNC: 8.6 MG/DL (ref 8.6–10.2)
CHLORIDE BLD-SCNC: 97 MMOL/L (ref 98–107)
CO2: 26 MMOL/L (ref 22–29)
CREAT SERPL-MCNC: 0.5 MG/DL (ref 0.5–1)
GFR AFRICAN AMERICAN: >60
GFR NON-AFRICAN AMERICAN: >60 ML/MIN/1.73
GLUCOSE BLD-MCNC: 109 MG/DL (ref 74–99)
HBA1C MFR BLD: 5.4 % (ref 4–5.6)
HCT VFR BLD CALC: 32.2 % (ref 34–48)
HEMOGLOBIN: 10.6 G/DL (ref 11.5–15.5)
MAGNESIUM: 2.4 MG/DL (ref 1.6–2.6)
MCH RBC QN AUTO: 29.7 PG (ref 26–35)
MCHC RBC AUTO-ENTMCNC: 32.9 % (ref 32–34.5)
MCV RBC AUTO: 90.2 FL (ref 80–99.9)
PDW BLD-RTO: 13.1 FL (ref 11.5–15)
PLATELET # BLD: 277 E9/L (ref 130–450)
PMV BLD AUTO: 11.5 FL (ref 7–12)
POTASSIUM SERPL-SCNC: 3.9 MMOL/L (ref 3.5–5)
RBC # BLD: 3.57 E12/L (ref 3.5–5.5)
SODIUM BLD-SCNC: 137 MMOL/L (ref 132–146)
WBC # BLD: 11.7 E9/L (ref 4.5–11.5)

## 2020-09-03 PROCEDURE — 85027 COMPLETE CBC AUTOMATED: CPT

## 2020-09-03 PROCEDURE — 6370000000 HC RX 637 (ALT 250 FOR IP): Performed by: NURSE PRACTITIONER

## 2020-09-03 PROCEDURE — 36415 COLL VENOUS BLD VENIPUNCTURE: CPT

## 2020-09-03 PROCEDURE — 83735 ASSAY OF MAGNESIUM: CPT

## 2020-09-03 PROCEDURE — 2700000000 HC OXYGEN THERAPY PER DAY

## 2020-09-03 PROCEDURE — 2580000003 HC RX 258: Performed by: NURSE PRACTITIONER

## 2020-09-03 PROCEDURE — 97530 THERAPEUTIC ACTIVITIES: CPT

## 2020-09-03 PROCEDURE — 99233 SBSQ HOSP IP/OBS HIGH 50: CPT | Performed by: NURSE PRACTITIONER

## 2020-09-03 PROCEDURE — 83036 HEMOGLOBIN GLYCOSYLATED A1C: CPT

## 2020-09-03 PROCEDURE — 99254 IP/OBS CNSLTJ NEW/EST MOD 60: CPT | Performed by: PHYSICAL MEDICINE & REHABILITATION

## 2020-09-03 PROCEDURE — 80048 BASIC METABOLIC PNL TOTAL CA: CPT

## 2020-09-03 PROCEDURE — 2060000000 HC ICU INTERMEDIATE R&B

## 2020-09-03 RX ORDER — MECLIZINE HCL 12.5 MG/1
25 TABLET ORAL 4 TIMES DAILY PRN
Status: DISCONTINUED | OUTPATIENT
Start: 2020-09-03 | End: 2020-09-11 | Stop reason: HOSPADM

## 2020-09-03 RX ORDER — ACETAMINOPHEN 500 MG
1000 TABLET ORAL EVERY 8 HOURS PRN
Status: DISCONTINUED | OUTPATIENT
Start: 2020-09-03 | End: 2020-09-11 | Stop reason: HOSPADM

## 2020-09-03 RX ADMIN — POTASSIUM BICARBONATE 20 MEQ: 782 TABLET, EFFERVESCENT ORAL at 08:25

## 2020-09-03 RX ADMIN — METOPROLOL SUCCINATE 50 MG: 50 TABLET, EXTENDED RELEASE ORAL at 08:25

## 2020-09-03 RX ADMIN — ENALAPRIL MALEATE 10 MG: 10 TABLET ORAL at 08:25

## 2020-09-03 RX ADMIN — HYDROCODONE BITARTRATE AND ACETAMINOPHEN 1 TABLET: 5; 325 TABLET ORAL at 15:54

## 2020-09-03 RX ADMIN — ACETAMINOPHEN 1000 MG: 500 TABLET ORAL at 21:37

## 2020-09-03 RX ADMIN — HYDROCODONE BITARTRATE AND ACETAMINOPHEN 1 TABLET: 5; 325 TABLET ORAL at 00:10

## 2020-09-03 RX ADMIN — ATORVASTATIN CALCIUM 80 MG: 80 TABLET, FILM COATED ORAL at 21:37

## 2020-09-03 RX ADMIN — MAGNESIUM GLUCONATE 500 MG ORAL TABLET 400 MG: 500 TABLET ORAL at 08:25

## 2020-09-03 RX ADMIN — Medication 10 ML: at 21:38

## 2020-09-03 RX ADMIN — LEVETIRACETAM 500 MG: 500 TABLET ORAL at 21:37

## 2020-09-03 RX ADMIN — LEVETIRACETAM 500 MG: 500 TABLET ORAL at 08:25

## 2020-09-03 RX ADMIN — Medication 10 ML: at 08:25

## 2020-09-03 ASSESSMENT — PAIN SCALES - GENERAL
PAINLEVEL_OUTOF10: 8
PAINLEVEL_OUTOF10: 4
PAINLEVEL_OUTOF10: 3
PAINLEVEL_OUTOF10: 3
PAINLEVEL_OUTOF10: 0

## 2020-09-03 ASSESSMENT — PAIN DESCRIPTION - PROGRESSION: CLINICAL_PROGRESSION: NOT CHANGED

## 2020-09-03 ASSESSMENT — PAIN DESCRIPTION - LOCATION: LOCATION: HEAD

## 2020-09-03 ASSESSMENT — PAIN DESCRIPTION - DESCRIPTORS: DESCRIPTORS: HEADACHE;DULL

## 2020-09-03 ASSESSMENT — PAIN - FUNCTIONAL ASSESSMENT: PAIN_FUNCTIONAL_ASSESSMENT: ACTIVITIES ARE NOT PREVENTED

## 2020-09-03 ASSESSMENT — PAIN DESCRIPTION - PAIN TYPE: TYPE: ACUTE PAIN

## 2020-09-03 ASSESSMENT — PAIN DESCRIPTION - FREQUENCY: FREQUENCY: INTERMITTENT
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
## 2020-09-03 NOTE — PROGRESS NOTES
Sitting EOB: SBA  Dynamic standing: Leonardo front [employer] Sitting EOB:  SBA  Dynamic Standing:  Leonardo front [employer]     Pt is A & O x 4. Some confusion noted throughout session, but pt follows all commands well. Sensation:  Pt denies numbness and tingling to extremities  Edema:  unremarkable    Vitals:  SPo2 monitored throughout session:  Pt taken to room air and maintained O2 saturation >95% throughout session. Patient education  Pt educated on role of PT intervention. Pt educated on safety in room with utilization of call light for assistance with mobility. Patient response to education:   Pt verbalized understanding Pt demonstrated skill Pt requires further education in this area   yes yes yes     ASSESSMENT:    Comments:  RN cleared pt for activity prior to session. Pt received supine in bed and agreeable to PT intervention at this time. Pt performed all functional mobility as noted above. Pt much more alert on this date and following commands well. Pt very talkative and required frequent redirection and max VC to complete tasks. In standing pt required max VC and TC to negotiate front [employer] from bed>chair d/t her visual deficits. At end of session pt transferred to chair and left with all needs met and call light in reach.  present throughout entirety of session. Educated  on pt safety and to not leave pt in chair alone and to allow staff to assist her back to bed when she wants. Pt would benefit from continued skilled PT intervention to maximize functional mobility and independence. Pt would benefit from physical medicine and rehabilitation consultation. Treatment:  Patient practiced and was instructed in the following treatment:     Therapeutic Activities Completed:  o Functional mobility as noted above:   - Bed mobility: SBA all aspects. VC and hand over hand assistance for pt to utilize bed rail.   Pt sat at EOB at SBA level with no LOB noted during scooting.    - Transfer training: sit<>stand modA with max VC for proper utilization of BUE and hand placement. Leonardo for dynamic balance during stand pivot transfer but max VC and TC for front [employer] management. - Ambulation: 5 feet bed>chair front [employer] Leonardo.   o Skilled repositioning in seated position for comfort.  o Pt education as noted above. PLAN:    Patient is making fair progress towards established goals. Will continue with current POC.       Time in  0830  Time out  0855    Total Treatment Time  25 minutes     CPT codes:  [] Gait training 69592 0 minutes  [] Manual therapy 76569 0 minutes  [x] Therapeutic activities 69732 25 minutes  [] Therapeutic exercises 30240 0 minutes  [] Neuromuscular reeducation 05641 0 minutes    Irwin Wade, PT, DPT  [id number]

## 2020-09-03 NOTE — PLAN OF CARE
Impaired:  Goal: Mental status will be restored to baseline  Description: Mental status will be restored to baseline  Outcome: Met This Shift     Problem: Nutrition Deficit:  Goal: Ability to achieve adequate nutritional intake will improve  Description: Ability to achieve adequate nutritional intake will improve  Outcome: Met This Shift     Problem: Pain:  Goal: Recognizes and communicates pain  Description: Recognizes and communicates pain  Outcome: Met This Shift  Goal: Control of acute pain  Description: Control of acute pain  Outcome: Met This Shift  Goal: Control of chronic pain  Description: Control of chronic pain  Outcome: Met This Shift     Problem: Serum Glucose Level - Abnormal:  Goal: Ability to maintain appropriate glucose levels will improve to within specified parameters  Description: Ability to maintain appropriate glucose levels will improve to within specified parameters  Outcome: Met This Shift     Problem: Skin Integrity - Impaired:  Goal: Will show no infection signs and symptoms  Description: Will show no infection signs and symptoms  Outcome: Met This Shift  Goal: Absence of new skin breakdown  Description: Absence of new skin breakdown  Outcome: Met This Shift     Problem: Sleep Pattern Disturbance:  Goal: Appears well-rested  Description: Appears well-rested  Outcome: Met This Shift     Problem: Tissue Perfusion, Altered:  Goal: Circulatory function within specified parameters  Description: Circulatory function within specified parameters  Outcome: Met This Shift     Problem: Tissue Perfusion - Cardiopulmonary, Altered:  Goal: Absence of angina  Description: Absence of angina  Outcome: Met This Shift  Goal: Hemodynamic stability will improve  Description: Hemodynamic stability will improve  Outcome: Met This Shift

## 2020-09-03 NOTE — PROGRESS NOTES
Acute Rehab Pre-Admission Screen      Referral date: 9/3/20  Onset/Hospital Admit Date: 8/23/2020 11:25 AM    Current Location: [id number]    Name: Efraín Hernandez  YOB: 1960  Age: 56 y.o. Admitting Diagnosis: NSTEMI, CVA, craniotomy   Address: Mitzi Khalil [address] [place] [address]  Home Phone: [phone] (home)  Cathy Beaulieu #:     Sex: female  Race:   Marital Status:    Ethnic/Cultural/Hindu Considerations: Latter-day    Advanced Directives: [x] Full Code  [] [facility] [] Medications only       [] Living Will  [] DPOA      []Organ donor      [] No mechanical breathing or ventilation     [] no tube feeding, nutrition or hydration      [x] Patient does not have advanced directives or living will         COVERAGE INFORMATION   Primary Insurer: Leander Blake  Payor Contact:   Phone:   FAX:  Authorization #:     Verified coverage: [] Patient  [] Family/caregiver    [x] financial department [] insurance carrier    MEDICAL UPDATE:  History of present admission: 56 year female admitted 8/23/20 after having an episode of psychosis and brought to the ED where they found elevated trops. NSTEMI and accelerated HTN. 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 Patient underwent craniotomy on 8/29/2020. Postop course was unremarkable    9/1- POD 3 craniotomy- today obtunded and hard to arouse. 9/2- improved cognition today. 9/3- Neurology recommended patient should begin antiplatelet therapy when ok with NSGY. 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 Plan for coiling in near future    PHYSICIAN / REFERRAL INFORMATION  Referring Physician: Andrew Chavez MD  Attending Physician: Екатерина Cobian MD  Primary Care Physician: No primary care provider on file.   Consultants/Opinions (see full consult notes on chart): IR- aneurysm  Neuro- aneurysm  neurosurgery- meningioma/craniotomy    SOCIAL INFORMATION  Primary  Contact: Serafin Franco  Relationship: spouse  Primary Phone: [phone]      Previous Community Services:   Caregiver available: [] Yes [] No Hours per day available: TBD  Patient previously employed:  [] Yes [] Part Time [] Full Time [x] No [] Retired  Occupation/Profession: unemployed  Prior living arrangements: [] Home  [] Assisted living  [] SNF [] Other  Lived with:  [x] Alone  [] Spouse  [] Family  [] Other  Lived with: unknown  Contact phone: n/a  Home:   story home   entry steps  Rails:   Steps:   to 2nd floor  Rails:     Bedroom: [] 1st floor  [] 2nd floor    Bathroom:  [] 1st floor  [] 2nd floor    Prior Functional Level: Independent for: all  Assistance for:   Dependent for:   Dominant hand: [] Right  [] Left    Previous Home Equipment:  [] Cane [] Grab bars [] Orthotic / prosthetic   [] Shower chair [] Tub bench  [] 3-in-1 Commode [] Long handle sponge   [] Oxygen [] Sock aide  [] Wheelchair  [] motorized wc/scooter  [] Wheelchair cushion   [] Crutches [] Long handle shoehorn  [] Reachers [] Toilet seat elevator [] Rollator  [] Walker(wheeled)   [] Walker(standard) [] Mechanical lift    [x] None of the above     Has patient had 2 or more falls in the past year or any fall with injury in the past year? [] yes   [] no   []unknown    Has patient had major surgery during past 100 days prior to admission?    [] yes   [] no Type/ Date:     Surgical History:  Past Surgical History:   Procedure Laterality Date    BRAIN SURGERY      CRANIOTOMY N/A 8/28/2020    CRANIOTOMY FRAMELESS STEREOTATIC FOR BRAIN TUMOR performed by Vik Arango MD at [facility]         Past Medical:  Past Medical History:   Diagnosis Date    Brain aneurysm     Hypertension     Lumbar herniated disc        Current Co-morbidities:  [] Alzheimer's   [] Dysphasia     [] Parkinsonism  [] Amputation   [] GERD  [] Peripheral artery disease   [] Anemia      [] Encephalopathy  [] Peripheral vascular disease  [] Anxiety   [] Gangrene   [] Pneumonia  [] Aphasia   [] Gout    [] Polyneuropathy  [] Asthma   [] Heart Failure (diastolic) [] Post-polio syndrome  [] Atrial fibrillation  [] Heart Failure (left-sided) [] Pseudomonas enteritis   [] Blind    [] Heart Failure (right-sided) [] Pulmonary embolism  [] Cellulitis     [] Heart Failure (systolic) [] Renal dialysis  [] Clostridium difficile  [] Hemiparesis   [] Renal failure  [] Congestive heart failure [] Hypertension   [] Rheumatoid arthritis  [] COPD   [] Hypotension   [] Seizure disorder   [] Coronary Artery Disease [] Hypothyroidism  [] Septicemia   [] Deaf    [] Hyperlipidemia   [] Sleep apnea  [] Depression   [] Morbid obesity  [] Spinal cord injury  [] Diabetes   [] MRSA   [] Stroke  [] Diabetic nephropathy  [] Myocardial infarction  [] Tracheostomy  [] Diabetic neuropathy  [] Osteoarthritis  [] Traumatic brain injury   [] Diabetic retinopathy  [] Osteoporosis   [] Urinary tract infection  [] DVT    [] Pancytopenia  [] Vocal cord paralysis  []  Spinal stenosis   []  kidney disease [] VRE  [] Post op    []    []        Medical/Functional Conditions requiring inpatient rehabilitation: Requires multidisciplinary treatment including PM&R physician daily care, 24 hour rehabilitation nursing, physical therapy, occupational therapy, rehabilitation psychology, recreation therapy and rehabilitation social work, nutrition services due to new deficits     Risk for Medical/Clinical Complications: Falls, injury, pain, skin breakdown, abnormal vitals, abnormal labs, DVT, PE, pneumonia, decreased mobility, neuro changes     CLINICAL DATA:     Height : 5'3     Weight:  199lbs   BMI: 35.39       Date: 9/4/20 Date:  Date:    temperature      pulse      respirations      Blood pressure      Pulse oximeter         ALLERGIES: Patient has no known allergies.     DIET : DIET GENERAL; No Caffeine  Dietary Nutrition Supplements: Low Calorie Special equipment:  [] Devices/Splints  [] Type   [] Brace   [] Type  [] Bariatric bed  [] Extra wide commode  [] Extra wide wheelchair [] Extra wide walker  [] Reji walker  [] Reji wheelchair  [] Transfer lift    [] Other equipment     FUNCTIONAL STATUS PT / Manuel / Pallavi Pollock:  Shelley Be / MARGARITO Discipline Initial: 8/31/20 Follow Up: 9/3/20 Current:    Eating OT Max Assist       Grooming OT Dependent       Bathing OT Dependent       Dressing Upper Extremity OT Dependent       Dressing Lower Extremity OT Dependent       Toileting OT Dependent       Toilet Transfers OT Moderate Assist       Tub/Shower Transfers OT nt     Homemaking OT nt     Bed Mobility PT Max Assist   Stand by Assist      Bed/Wheelchair Transfers PT Moderate Assist   Moderate Assist.    Locomotion Walk / Wheelchair  Device:  Distance: PT nt 5 feet with FWW Minimum assistance      Endurance PT      Expression SP      Social Interaction SP      Problem Solving SP      Memory SP      Comprehension SP      Swallowing SP      Bowel Management NSG      Bladder Management NSG        Comments on Functional Status:     [x] Able to participate a minimum of 3 hours per day of therapy intervention    Required treatments/services: [x] Rehabilitation nursing [] Dietitian / nurtition                 [x] Case management  [] Respiratory Therapy      [x] Social work   [] Other     Required Therapy:  Therapy Hours per Day Days per Week Therapeutic Interventions Required   [x] Physical Therapy 1 5-7 Gait, transfers, Safety, strength, education, endurance   [x] Occupational Therapy 1 5-7 ADLs, IADLs, Safety, strength, education, endurance   [] Speech Pathology 1 5-7 Speech, cognition, safety, eduaction   [] Prosthetics / Orthotics       []         Anticipated Discharge Plan:   Anticipated DME Needs:  [] Home     [] Commode   [] Alone    [] Wheelchair   [] Supervised    [] Mercedez Garcia   [] Assist    [] Oxygen        [] Hospital Bed  [] Assisted Living    [] Ramp        [x] To Be Determined    Anticipated Home Health Services:  Anticipated Outpatient Services:  [] PT       [] PT  [] OT      [] OT  [] Speech     [] Speech  [] Nursing     [] Dialysis  [] Aide      [x] To Be Determined  [x] To Be Determined    Anticipated support group:  [] Amputation  [] Multiple Sclerosis  [] Stroke  [] Brain Injury  [] Spinal cord injury  [] Other     Barriers to discharge:     Discharge Support: [] Patient lives alone and does not have a caregiver available     [] Patient has a caregiver available     [] Discharge plan has been verified with patient's caregiver      [] Caregiver is in agreement with the discharge plan     Expected functional status for safe discharge:     Patient/support person goals:     Expected length of stay:     Discussed expected length of stay and agreeable to IRF plan: [] Yes   [] No    Impairment Group Category:     Etiological Diagnosis:     Primary Rehabilitation Diagnosis:     Electronically signed by Rose Ron RN on 9/3/2020 at 11:59 AM    Prescreen completed __________________________________ (signature of prescreener)    Date:    Time:      JUSTIFICATION FOR ADMISSION TO ACUTE REHABILITATION:          RECOMMEND LEVEL OF CARE  Recommend inpatient rehabilitation: [] Yes   [] No  If no indicate reason:  [] Functional level too high  [] Unmotivated  [] No insurance carrier approval [] Unlikely to return to community  [] No medical necessity  [] Patient or family chose other facility  [] Too medically complex  [] Inadequate discharge plan  [x] Rehabilitation bed unavailable [] Functional level too low  [] patient or family refused ARU    If patient not accepted for IRF admission, recommended level of care:  [] [address]  [] [facility]  [] [place]   [] Home Care  [] Other      [] LTAC       Physician Assigned:  [x] Dr. Colby Morrison         [] Dr. Facundo Fuller              [] Dr. Kit Woods [] Dr. Marci Grijalva  [] Dr. Saul Cai ADMISSION DETERMINATION AND REVIEW:    ____________________________________________________________________  ____________________________________________________________________  ____________________________________________________________________  ____________________________________________________________________  ____________________________________________________________________      Physician Signature:_____________________________________    Print Signature:_________________________________________    Date:    Time:        PRE-SCREEN ASSESSMENT UPDATE (if not admitted within 48 hours of initial pre-screen)    Medical Update/Changes:     Functional Update/Changes:     Reviewer Signature:_____________________________________    Date:   Time:     PHYSICIAN ADMISSION DETERMINATION AND REVIEW UPDATE:     ____________________________________________________________________  ____________________________________________________________________  ____________________________________________________________________  ____________________________________________________________________  ____________________________________________________________________    Physician Signature:_____________________________________    Print Signature:_________________________________________    Date:     Time:

## 2020-09-03 NOTE — CARE COORDINATION
SW spoke with Pt's  about Transition Plan of Care.  checked with Insurance  About where Pt can go for Rehab. [facility] is out of network now. [facility] - [facility] is in network. Therapy is recommending ARU. SW requested a PM & R consult from Charge Nurse. Discharge Plan is [facility] [place] versus ARU. SW will follow for discharge needs.    Francisca Milian, L.S.W.  [phone]
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
## 2020-09-03 NOTE — PROGRESS NOTES
Physician Progress Note      Slava Gaona  CSN #:                  [id number]  :                       1960  ADMIT DATE:       2020 11:25 AM  [address] DATE:  RESPONDING  PROVIDER #:        Joe Kearney MD          QUERY TEXT:    Dear Dr. Bryan Ling,    Pt admitted with Acute psychosis. Pt noted to have para sagittal meningioma   causing mass effect and vasogenic edema in the (L) occipital lobe. If   clinically significant, please document in progress notes and discharge   summary if you are evaluating/treating any of the following: The medical record reflects the following:  Risk Factors: Know medical history of meningioma  Clinical Indicators: Ct scan showing progression in size of posterior left   para sagittal meningioma causing significant mass effect in the left occipital   lobe, depressing left-sided tentorium inferiorly and causing vasogenic edema   in the left occipital lobe parenchyma, pt is experiencing psychotic episode,   confusion  Treatment: Admission to telemetry, Neurosurgery consult, CT scan x2 to monitor   progression, close patient monitoring    Thank you,  Roula WARE, RN  Clinical Documentation Improvement  [email or web address]  [phone] (personal cell)  Options provided:  -- Cerebral edema and Brain compression  -- Cerebral edema  -- Brain compression  -- Other - I will add my own diagnosis  -- Disagree - Not applicable / Not valid  -- Disagree - Clinically unable to determine / Unknown  -- Refer to Clinical Documentation Reviewer    PROVIDER RESPONSE TEXT:    This patient has cerebral edema and brain compression.     Query created by: Jamey Pacheco on 9/3/2020 2:09 PM      Electronically signed by:  Joe Kearney MD 9/3/2020 5:18 PM

## 2020-09-03 NOTE — PROGRESS NOTES
PO#5  PROCEDURES:  1.  Frameless stereotactic left parietooccipital craniotomy for the  excision of large meningioma. 2.  Excision of large sebaceous cyst, left occipital scalp. Doing very well. Cognition has improved a lot. No aggressive behavior. More responsive today. Neurology on board  Had CTA which revealed Basilar artery aneurysm.   Will discuss with her  who has [address] with Dr Simi Davis who will be glad to consider coiling of the aneurysm

## 2020-09-03 NOTE — PROGRESS NOTES
Jose David Real is a 56 y.o. right handed female     Neurology is following for stroke    PMH: HTN, right MCA aneurysm s/p repair, lumbar disc herniation, former smoker    She presented with altered mental status and chest pain followed by bizarre behavior and and suicidal ideations. SBP was in the 200s. CT of the head showed a large left parasagittal meningioma with mass-effect and significant edema in the left occipital lobe. She underwent parieto-occipital crani on 8/24. She was more obtunded postop and repeat CT showed an increasing infarct in the posterior right cerebellum and cerebral edema on the left. 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 Dr. Kat Tidwell spoke with interventional radiology and there is consideration about coiling this abnormality. She is complaining of a throbbing left-sided headache and some vertigo, as well as vision issues in her right periphery. She is clumsy on her right side. BPs have improved. No other new issues.     No chest pain or palpitations  No SOB  No  lightheadedness or loss of consciousness  No falls, tripping or stumbling  No incontinence of bowels or bladder  No itching or bruising appreciated  No speech or swallowing troubles    ROS otherwise negative     Current Facility-Administered Medications   Medication Dose Route Frequency Provider Last Rate Last Dose    sodium chloride flush 0.9 % injection 10 mL  10 mL Intravenous Once Margaret Fuentes MD        levETIRAcetam (KEPPRA) tablet 500 mg  500 mg Oral BID Slick Falcon, APRN - CNP   500 mg at 09/03/20 0825    HYDROcodone-acetaminophen (NORCO) 5-325 MG per tablet 1 tablet  1 tablet Oral Q6H PRN Slick Falcon, APRN - CNP   1 tablet at 09/03/20 0010    potassium bicarb-citric acid (EFFER-K) effervescent tablet 20 mEq  20 mEq Oral Daily Krugerriver Falcon, APRN - CNP   20 mEq at 09/03/20 0825    magnesium oxide (MAG-OX) tablet 400 mg  400 mg Oral Daily Molly Reining, APRN - CNP   400 mg at 09/03/20 0825    sodium chloride flush 0.9 % injection 10 mL  10 mL Intravenous 2 times per day Molly Reining, APRN - CNP   10 mL at 09/03/20 0825    sodium chloride flush 0.9 % injection 10 mL  10 mL Intravenous PRN Molly Reining, APRN - CNP   10 mL at 09/01/20 1529    acetaminophen (TYLENOL) tablet 650 mg  650 mg Oral Q6H PRN Molly Reining, APRN - CNP   650 mg at 08/29/20 1657    polyethylene glycol (GLYCOLAX) packet 17 g  17 g Oral Daily PRN Molly Reining, APRN - CNP        ondansetron [facility]) injection 4 mg  4 mg Intravenous Q6H PRN Molly Reining, APRN - CNP   4 mg at 08/29/20 0849    atorvastatin (LIPITOR) tablet 80 mg  80 mg Oral Nightly Molly Reining, APRN - CNP   80 mg at 09/02/20 2134    enalapril (VASOTEC) tablet 10 mg  10 mg Oral Daily Molly Reining, APRN - CNP   10 mg at 09/03/20 0825    metoprolol succinate (TOPROL XL) extended release tablet 50 mg  50 mg Oral Daily Molly Reining, APRN - CNP   50 mg at 09/03/20 0825    hydrALAZINE (APRESOLINE) injection 5 mg  5 mg Intravenous Q6H PRN Molly Reining, APRN - CNP   5 mg at 09/01/20 1529     Objective:     /66   Pulse 66   Temp 97.4 °F (36.3 °C) (Temporal)   Resp 18   Ht 5' 3\" (1.6 m)   Wt 199 lb 12.8 oz (90.6 kg)   SpO2 99%   BMI 35.39 kg/m²     General appearance: Awake, appears stated age, cooperative and no distress; morbidly obese  Head: crani site L parietal region dry and intact  Eyes: conjunctivae/corneas clear.  .  Neck: no carotid bruit  Lungs: clear to auscultation bilaterally  Heart: regular rate and rhythm--no murmur  Extremities: 1+ at ankles  Pulses: 2+ and symmetric  Skin: color, texture, turgor normal---no rashes or lesions      Mental Status: drowsy, oriented x4--flat affect    Good attention/concentration--requires frequent cueing  Perseverates at times  Somewhat slower responses    Speech: No dysarthria  Language:  no aphasias--more conversational today    Cranial Nerves:  I: smell NA   II: visual acuity  NA   II: visual fields  dense right homonymous hemianopsia   II: pupils NIGHAT   III,VII: ptosis  slight left   III,IV,VI: extraocular muscles   EOMI without nystagmus   V: mastication Normal   V: facial light touch sensation  Normal   V,VII: corneal reflex     VII: facial muscle function - upper  Normal   VII: facial muscle function - lower Normal   VIII: hearing Normal   IX: soft palate elevation  Normal   IX,X: gag reflex    XI: trapezius strength  5/5   XI: sternocleidomastoid strength 5/5   XI: neck extension strength  5/5   XII: tongue strength  Normal     Motor:  5/5 BUE; 4/5 LLE; 5/5 RLE  Obese bulk; spastic legs  No abnormal movements    Sensory:  LT symmetric in all limbs    Coordination:   Right brandy-ataxia with finger-to-nose and fine finger movements    DTR:   Right Brachioradialis reflex 2+  Left Brachioradialis reflex 3+  Right Biceps reflex 2+  Left Biceps reflex 2+  Right Quadriceps reflex 2+  Left Quadriceps reflex 2+  Right Achilles reflex 1+  Left Achilles reflex 1+    B/l Babinskis  No Lopez's    No pathological reflexes    Laboratory/Radiology:     Lab Results   Component Value Date     09/03/2020    K 3.9 09/03/2020    CL 97 (L) 09/03/2020    CO2 26 09/03/2020    BUN 25 (H) 09/03/2020    CREATININE 0.5 09/03/2020    GLUCOSE 109 (H) 09/03/2020    CALCIUM 8.6 09/03/2020    LABGLOM >60 09/03/2020    GFRAA >60 09/03/2020       Lab Results   Component Value Date    WBC 11.7 (H) 09/03/2020    HGB 10.6 (L) 09/03/2020    HCT 32.2 (L) 09/03/2020    MCV 90.2 09/03/2020     09/03/2020    LYMPHOPCT 18.2 (L) 08/23/2020    RBC 3.57 09/03/2020    MCH 29.7 09/03/2020    MCHC 32.9 09/03/2020    RDW 13.1 09/03/2020       FLP:    Lab Results   Component Value Date    TRIG 133 08/24/2020    HDL 44 08/24/2020    LDLCALC 111 08/24/2020    LABVLDL 27 08/24/2020     CT head 8/31: Right MCA aneurysm clipping with large area of encephalomalacia right MCA territory; pneumocephalus left occipital lobe with postop changes; new hypodensity right cerebellum compared to 8/24 scan. Echo (no bubble): left ventricle hypertrophy. Normal left ventricular systolic function. Visually estimated LVEF is 60-65 %. No wall motion abnormalities. Normal right ventricle structure and function. No significant valvular abnormalities. No comparison study available. CTA head: Basilar tip aneurysm measures approximately 8.6 cm in length and 5.7 cm in transverse dimensions. This is a broad necked aneurysm. Large postsurgical defect medial left occipital lobe with essentially stable postoperative hemorrhage along the margins of the surgical defect. Postoperative hematoma in the medial right occipital lobe continues to decrease in size. Focal infarct of the superior aspect of the right cerebellum measuring approximately 17 x 15 mm is unchanged. Encephalomalacia within the right temporal frontal and parietal confluence from remote infarction. There is ex vacuo enlargement of the right lateral ventricle. Left cerebral edema slightly improved. Prior anterior temporal fossa aneurysm clipping. Prior craniotomy defects of the right temporal bone, the posterior midline occipital bone and bilateral parietal bones. All labs and images personally reviewed today    Assessment:     Right cerebellar ischemic stroke in a patient with large L parieto-occipital meningioma s/p crani and hx R MCA aneurysm repair. She presented with uncontrolled hypertension which could be the cause of this infarct. She also has an unruptured basilar tip aneurysm, which can cause cerebellar infarcts, though this is more rare--however, discussions about coiling this abnormality being had. Clinically, she is improving overall aside from her left-sided headache, her right hemiataxia, her right homonymous hemianopsia, and some vertigo.      Plan:     Discussed intervention for basilar aneurysm with NSGY--Dr. Dean Marroquin in discussions with IR    Recommend antiplatelet therapy when able    Increase Tylenol to 1000 mg q8 PRN HAs    Meclizine 25 mg four times daily PRN vertigo    Control BP     PT/OT/ST     We will follow closely    REAGAN Hinojosa-CNP  11:29 AM  9/3/2020

## 2020-09-03 NOTE — CONSULTS
PM&R Consult Note  Patient: Minor Hancock  Age/sex: 56 y.o. female  Medical Record #: [id number]      Referring physician: Trisha Boyd MD  Consulting physician: Dr. Óscar Dejesus MD  Primary care provider: No primary care provider on file. Chief complaint:   Impairments and acitivities limitations in ADLs and mobility secondary to large left parieto-occipital brain tumor, CVA      HPI:   Minor Hancock is a 56 y.o. female with history of aneurysm with repair in 2009. Patient presented to [facility] on 8/[facility]/2020 due to change in mental status, bizarre behavior, acute psychosis. She was admitted for further evaluation. She was seen by Cardiology due to elevated troponins, felt to be likely due to demand ischemia due to elevated BP. She was evaluated by Psychiatry, who felt she may require admission to [address] [place] [address] once medically cleared for psychiatric stabilization. Further workup revealed large left parietoccipital lobe meningioma. She was seen by NSGY. She underwent left parietoccipital craniotomy for excision of large meningioma on 8/28/2020. Patient was more obtunded postoperatively and repeat CT showed infarct in the right posterior cerebellum and cerebral edema on the left side. Post stroke workup was initiated. Neurology recommended patient should begin antiplatelet therapy when ok with NSGY. CTA of the head showed evidence of unruptured basilar tip aneurysm, measuring 8.6x2.7cm. Dr. Ayesha Bahena and interventional radiology discussed consideration of aneurysm coiling. She has participated in therapy evaluations. Past Medical History:   Diagnosis Date    Brain aneurysm     Hypertension     Lumbar herniated disc      Past Surgical History:   Procedure Laterality Date    BRAIN SURGERY      CRANIOTOMY N/A 8/28/2020    CRANIOTOMY FRAMELESS STEREOTATIC FOR BRAIN TUMOR performed by Venancio Guzman MD at [facility]         History reviewed. No pertinent family history.     No Known Allergies    Scheduled Meds:  sodium chloride flush, 10 mL, Once  levETIRAcetam, 500 mg, BID  potassium bicarb-citric acid, 20 mEq, Daily  magnesium oxide, 400 mg, Daily  sodium chloride flush, 10 mL, 2 times per day  atorvastatin, 80 mg, Nightly  enalapril, 10 mg, Daily  metoprolol succinate, 50 mg, Daily        PRN Meds  meclizine, 25 mg, 4x Daily PRN  acetaminophen, 1,000 mg, Q8H PRN  HYDROcodone 5 mg - acetaminophen, 1 tablet, Q6H PRN  sodium chloride flush, 10 mL, PRN  polyethylene glycol, 17 g, Daily PRN  ondansetron, 4 mg, Q6H PRN  hydrALAZINE, 5 mg, Q6H PRN        Social History:  Social History     Socioeconomic History    Marital status:      Spouse name: Not on file    Number of children: Not on file    Years of education: Not on file    Highest education level: Not on file   Occupational History    Not on file   Social Needs    Financial resource strain: Not on file    Food insecurity     Worry: Not on file     Inability: Not on file    Transportation needs     Medical: Not on file     Non-medical: Not on file   Tobacco Use    Smoking status: Former Smoker     Types: Cigarettes     Last attempt to quit: 2009     Years since quittin.6   Substance and Sexual Activity    Alcohol use: No    Drug use: No    Sexual activity: Yes   Lifestyle    Physical activity     Days per week: Not on file     Minutes per session: Not on file    Stress: Not on file   Relationships    Social connections     Talks on phone: Not on file     Gets together: Not on file     Attends Hoahaoism service: Not on file     Active member of club or organization: Not on file     Attends meetings of clubs or organizations: Not on file     Relationship status: Not on file    Intimate partner violence     Fear of current or ex partner: Not on file     Emotionally abused: Not on file     Physically abused: Not on file     Forced sexual activity: Not on file   Other Topics Concern    Not on file   Social intact  VIII. Hearing: intact  XI/X: Palate: intact  XI: Shoulder shrug/SCM:intact  XII: Tongue: intact      Coordination:  F - N: ataxic on right    Sensory:    LT: intact            Motor: Tone was normal    Strength 4/5 left lower extremity, otherwise 5/5        DTRs:  Reflex Right Left   Biceps 2+ 2+   Triceps 2+ 2+   Brachioradialis 2+ 2+   Patella 2+ 2+   Achilles  1+ 1+     Bilateral Babinski         Laboratory:    Lab Results   Component Value Date    WBC 11.7 (H) 09/03/2020    HGB 10.6 (L) 09/03/2020    HCT 32.2 (L) 09/03/2020    MCV 90.2 09/03/2020     09/03/2020     Lab Results   Component Value Date     09/03/2020    K 3.9 09/03/2020    K 4.6 08/24/2020    CL 97 09/03/2020    CO2 26 09/03/2020    BUN 25 09/03/2020    CREATININE 0.5 09/03/2020    GLUCOSE 109 09/03/2020    GLUCOSE 94 05/10/2012    CALCIUM 8.6 09/03/2020      Lab Results   Component Value Date    ALT 27 08/24/2020    AST 38 (H) 08/24/2020    ALKPHOS 49 08/24/2020    BILITOT 1.1 08/24/2020       Lab Results   Component Value Date    COLORU Yellow 08/23/2020    NITRU Negative 08/23/2020    GLUCOSEU Negative 08/23/2020    KETUA TRACE 08/23/2020    UROBILINOGEN 0.2 08/23/2020    BILIRUBINUR Negative 08/23/2020     Lab Results   Component Value Date    LABA1C 5.4 09/03/2020       Pertinent Imaging:  Available imaging reviewed       IMPRESSION:  Juventino Minor is a 56 y.o. female with impairments and acitivities limitations in ADLs and mobility secondary to large left parieto-occipital meningioma, s/p left parietoccipital craniotomy with excision of tumor, as well as right cerebellar ischemic CVA. Patient also has unruptured basilar tip aneurysm. Recommendations:   Patient has potential for Acute rehab pending medical stability and completion of workup. Intervention for basilar aneurysm is being considered. Will need to review updated therapy evaluations once any planned intervention is complete.  Will also need to confirm level of assistance available to patient at home. Will follow. Thank you for the consult.     Litzy Lofton MD  Physical Medicine and Rehabilitation

## 2020-09-04 LAB
ANION GAP SERPL CALCULATED.3IONS-SCNC: 13 MMOL/L (ref 7–16)
BUN BLDV-MCNC: 25 MG/DL (ref 8–23)
CALCIUM SERPL-MCNC: 8.9 MG/DL (ref 8.6–10.2)
CHLORIDE BLD-SCNC: 97 MMOL/L (ref 98–107)
CO2: 26 MMOL/L (ref 22–29)
CREAT SERPL-MCNC: 0.5 MG/DL (ref 0.5–1)
GFR AFRICAN AMERICAN: >60
GFR NON-AFRICAN AMERICAN: >60 ML/MIN/1.73
GLUCOSE BLD-MCNC: 77 MG/DL (ref 74–99)
HCT VFR BLD CALC: 33.7 % (ref 34–48)
HEMOGLOBIN: 10.9 G/DL (ref 11.5–15.5)
MAGNESIUM: 2.5 MG/DL (ref 1.6–2.6)
MCH RBC QN AUTO: 29.3 PG (ref 26–35)
MCHC RBC AUTO-ENTMCNC: 32.3 % (ref 32–34.5)
MCV RBC AUTO: 90.6 FL (ref 80–99.9)
PDW BLD-RTO: 13.2 FL (ref 11.5–15)
PLATELET # BLD: 300 E9/L (ref 130–450)
PMV BLD AUTO: 11.4 FL (ref 7–12)
POTASSIUM SERPL-SCNC: 3.9 MMOL/L (ref 3.5–5)
RBC # BLD: 3.72 E12/L (ref 3.5–5.5)
SODIUM BLD-SCNC: 136 MMOL/L (ref 132–146)
WBC # BLD: 10.7 E9/L (ref 4.5–11.5)

## 2020-09-04 PROCEDURE — 97535 SELF CARE MNGMENT TRAINING: CPT

## 2020-09-04 PROCEDURE — 85027 COMPLETE CBC AUTOMATED: CPT

## 2020-09-04 PROCEDURE — 36415 COLL VENOUS BLD VENIPUNCTURE: CPT

## 2020-09-04 PROCEDURE — 6370000000 HC RX 637 (ALT 250 FOR IP): Performed by: NURSE PRACTITIONER

## 2020-09-04 PROCEDURE — 2060000000 HC ICU INTERMEDIATE R&B

## 2020-09-04 PROCEDURE — 2580000003 HC RX 258: Performed by: NURSE PRACTITIONER

## 2020-09-04 PROCEDURE — 99233 SBSQ HOSP IP/OBS HIGH 50: CPT | Performed by: NURSE PRACTITIONER

## 2020-09-04 PROCEDURE — 83735 ASSAY OF MAGNESIUM: CPT

## 2020-09-04 PROCEDURE — 80048 BASIC METABOLIC PNL TOTAL CA: CPT

## 2020-09-04 RX ADMIN — ENALAPRIL MALEATE 10 MG: 10 TABLET ORAL at 09:09

## 2020-09-04 RX ADMIN — ATORVASTATIN CALCIUM 80 MG: 80 TABLET, FILM COATED ORAL at 22:05

## 2020-09-04 RX ADMIN — Medication 10 ML: at 22:06

## 2020-09-04 RX ADMIN — Medication 10 ML: at 09:08

## 2020-09-04 RX ADMIN — ACETAMINOPHEN 1000 MG: 500 TABLET ORAL at 09:08

## 2020-09-04 RX ADMIN — LEVETIRACETAM 500 MG: 500 TABLET ORAL at 09:08

## 2020-09-04 RX ADMIN — LEVETIRACETAM 500 MG: 500 TABLET ORAL at 22:05

## 2020-09-04 RX ADMIN — POTASSIUM BICARBONATE 20 MEQ: 782 TABLET, EFFERVESCENT ORAL at 09:09

## 2020-09-04 RX ADMIN — MAGNESIUM GLUCONATE 500 MG ORAL TABLET 400 MG: 500 TABLET ORAL at 09:09

## 2020-09-04 RX ADMIN — HYDROCODONE BITARTRATE AND ACETAMINOPHEN 1 TABLET: 5; 325 TABLET ORAL at 17:31

## 2020-09-04 RX ADMIN — METOPROLOL SUCCINATE 50 MG: 50 TABLET, EXTENDED RELEASE ORAL at 09:09

## 2020-09-04 ASSESSMENT — PAIN SCALES - GENERAL
PAINLEVEL_OUTOF10: 3
PAINLEVEL_OUTOF10: 8
PAINLEVEL_OUTOF10: 5
PAINLEVEL_OUTOF10: 0
PAINLEVEL_OUTOF10: 0

## 2020-09-04 ASSESSMENT — PAIN DESCRIPTION - PAIN TYPE
TYPE: ACUTE PAIN
TYPE: ACUTE PAIN

## 2020-09-04 ASSESSMENT — PAIN DESCRIPTION - LOCATION
LOCATION: HEAD
LOCATION: HEAD

## 2020-09-04 ASSESSMENT — PAIN DESCRIPTION - PROGRESSION: CLINICAL_PROGRESSION: NOT CHANGED

## 2020-09-04 ASSESSMENT — PAIN DESCRIPTION - ORIENTATION: ORIENTATION: MID

## 2020-09-04 ASSESSMENT — PAIN DESCRIPTION - DESCRIPTORS: DESCRIPTORS: HEADACHE;DULL;ACHING

## 2020-09-04 NOTE — CARE COORDINATION
9/4/2020 - Neurosurgery following. Pt for aneurysm coiling, possibly on Tuesday. Spoke with Luis Alberto from ARU. They were inquiring about the discharge plan for pt when she would leave ARU. Spoke with pt , Silvestre Guerrero. He said he is in the process of cleaning out the pt's apartment and will have the pt go there at discharge and he would stay with her there. He states he is willing to do whaevert is in the best interest of the patient. I also received a message from Shabbir Guaman form [facility] and she reports she has an [id number] for acceptance to [facility]. Informed her of pt not being discharged due to procedure. She will update auth on pt if needed. ARU will have to re-evaluate pt after the aneurysm coiling to see if pt remains appropriate for ARU. Plan for discharge is [facility] vs [facility]. SW/CM will follow.

## 2020-09-04 NOTE — PROGRESS NOTES
Juventino Minor is a 56 y.o. right handed female     Neurology is following for stroke    PMH: HTN, right MCA aneurysm s/p repair, lumbar disc herniation, former smoker    She presented with altered mental status and chest pain followed by bizarre behavior and and suicidal ideations. SBP was in the 200s. CT of the head showed a large left parasagittal meningioma with mass-effect and significant edema in the left occipital lobe. She underwent parieto-occipital crani on 8/24. She was more obtunded postop and repeat CT showed an increasing infarct in the posterior right cerebellum and cerebral edema on the left. She is sitting up in a chair and doing even better today aside from from clumsiness and a persistent right visual field defect. Her left-sided headache is a 4/10 and she continues to feel some dizziness. Discussions are being had regarding coiling her basilar tip aneurysm. Her  is at the bedside.     No chest pain or palpitations  No SOB  No  lightheadedness or loss of consciousness  No falls, tripping or stumbling  No incontinence of bowels or bladder  No itching or bruising appreciated  No speech or swallowing troubles    ROS otherwise negative     Current Facility-Administered Medications   Medication Dose Route Frequency Provider Last Rate Last Dose    meclizine (ANTIVERT) tablet 25 mg  25 mg Oral 4x Daily PRN Silva Gutierrez APRN - CNP        acetaminophen (TYLENOL) tablet 1,000 mg  1,000 mg Oral Q8H PRN Silva Gutierrez, APRN - CNP   1,000 mg at 09/04/20 0908    sodium chloride flush 0.9 % injection 10 mL  10 mL Intravenous Once Alber Mitchell MD        levETIRAcetam (KEPPRA) tablet 500 mg  500 mg Oral BID Maxene Edie, APRN - CNP   500 mg at 09/04/20 0908    HYDROcodone-acetaminophen (NORCO) 5-325 MG per tablet 1 tablet  1 tablet Oral Q6H PRN Maxmaggie Irizarry, APRN - CNP   1 tablet at 09/03/20 1554    potassium bicarb-citric acid (EFFER-K) effervescent tablet 20 mEq  20 mEq Nerves:  I: smell NA   II: visual acuity  NA   II: visual fields  dense right homonymous hemianopsia   II: pupils NIGHAT   III,VII: ptosis  none today   III,IV,VI: extraocular muscles   EOMI without nystagmus   V: mastication Normal   V: facial light touch sensation  Normal   V,VII: corneal reflex     VII: facial muscle function - upper  Normal   VII: facial muscle function - lower Normal   VIII: hearing Normal   IX: soft palate elevation  Normal   IX,X: gag reflex    XI: trapezius strength  5/5   XI: sternocleidomastoid strength 5/5   XI: neck extension strength  5/5   XII: tongue strength  Normal     Motor:  5/5 throughout  Obese bulk; normal tone  No drift  No abnormal movements    Sensory:  LT symmetric in all limbs    Coordination:   Improved right brandy-ataxia with finger-to-nose    DTR:   No Lopez's    No pathological reflexes    Laboratory/Radiology:     Lab Results   Component Value Date     09/04/2020    K 3.9 09/04/2020    CL 97 (L) 09/04/2020    CO2 26 09/04/2020    BUN 25 (H) 09/04/2020    CREATININE 0.5 09/04/2020    GLUCOSE 77 09/04/2020    CALCIUM 8.9 09/04/2020     Lab Results   Component Value Date    WBC 10.7 09/04/2020    HGB 10.9 (L) 09/04/2020    HCT 33.7 (L) 09/04/2020    MCV 90.6 09/04/2020     09/04/2020    LYMPHOPCT 18.2 (L) 08/23/2020    RBC 3.72 09/04/2020    MCH 29.3 09/04/2020    MCHC 32.3 09/04/2020    RDW 13.2 09/04/2020     FLP:    Lab Results   Component Value Date    TRIG 133 08/24/2020    HDL 44 08/24/2020    LDLCALC 111 08/24/2020    LABVLDL 27 08/24/2020      Ref. Range 9/3/2020 04:36   Hemoglobin A1C Latest Ref Range: 4.0 - 5.6 % 5.4     CT head 8/31: Right MCA aneurysm clipping with large area of encephalomalacia right MCA territory; pneumocephalus left occipital lobe with postop changes; new hypodensity right cerebellum compared to 8/24 scan. Echo (no bubble): left ventricle hypertrophy. Normal left ventricular systolic function.    Visually estimated LVEF is 60-65 %. No wall motion abnormalities. Normal right ventricle structure and function. No significant valvular abnormalities. No comparison study available. CTA head: Basilar tip aneurysm measures approximately 8.6 cm in length and 5.7 cm in transverse dimensions. This is a broad necked aneurysm. Large postsurgical defect medial left occipital lobe with essentially stable postoperative hemorrhage along the margins of the surgical defect. Postoperative hematoma in the medial right occipital lobe continues to decrease in size. Focal infarct of the superior aspect of the right cerebellum measuring approximately 17 x 15 mm is unchanged. Encephalomalacia within the right temporal frontal and parietal confluence from remote infarction. There is ex vacuo enlargement of the right lateral ventricle. Left cerebral edema slightly improved. Prior anterior temporal fossa aneurysm clipping. Prior craniotomy defects of the right temporal bone, the posterior midline occipital bone and bilateral parietal bones. All labs and images personally reviewed today    Assessment:     Right cerebellar ischemic stroke in a patient with large L parieto-occipital meningioma s/p crani and hx R MCA aneurysm repair. She presented with uncontrolled hypertension which could be the cause of this infarct. Her unruptured basilar tip aneurysm can cause cerebellar infarcts, though this is more rare. However, she it risk for rupture of this aneurysm and ICH    Clinically, her mentation has greatly improved and she displays improving ataxia but a persistent right homonymous hemianopsia. Plan:      For possible basilar aneurysm coiling    Recommend antiplatelet therapy when able    NSGY following    Control BP     She would probably be a good ARU candidate    Discussed with her     Neuro will see DAYSI Pino  10:13 AM  9/4/2020

## 2020-09-04 NOTE — PROGRESS NOTES
Spoke with Walter Mora RN. Patient is to have a cerebral angiogram with a basilar artery aneurysm coiling with anesthesia here in IR on Tuesday, 9/8/20 at 11 am to be done by Dr. Rhona Martinez only per Dr. Steven Good. Dr. Viola Hassan has been called from Anesthesia and is aware of the patient and she has been put on the grid for Tuesday 9/8. Patient ate at 1300 today. Patient is to be NPO after midnight on Monday, continue to hold blood thinners, request new labs to be drawn first thing Tuesday morning, including a PT/INR. Requested 2 IV sites to be available for anesthesia's use on Tuesday. Walter Mora states she will contact Dr. Steven Good to place the order for the aneurysm coiling to be done with Dr. Rhona Martinez on Tuesday 9/8/20.

## 2020-09-04 NOTE — PROGRESS NOTES
Comprehensive Nutrition Assessment    Type and Reason for Visit:  Reassess    Nutrition Recommendations/Plan: Continue current diet/ONS    Nutrition Assessment:  Pt remains nutritionally at risk w/ average PO intake remaining minimal, though appetite significantly improved today per RN. Pt s/p crani for tumor resection, now pending coiling of aneurysm. Will continue current nutrition plan and monitor    Malnutrition Assessment:  Malnutrition Status: At risk for malnutrition (Comment)    Context:  Acute Illness     Findings of the 6 clinical characteristics of malnutrition:  Energy Intake:  1 - 75% or less of estimated energy requirements for 7 or more days  Weight Loss:  Unable to assess(d/t lack of wt hx)     Body Fat Loss:  Unable to assess(data not available to assess at this time)     Muscle Mass Loss:  Unable to assess(data not available to assess at this time)    Fluid Accumulation:  No significant fluid accumulation     Strength:  Not Performed    Estimated Daily Nutrient Needs:  Energy (kcal):  ; Weight Used for Energy Requirements:  Current     Protein (g):  80-95;  Weight Used for Protein Requirements:  Ideal(1.5-1.8)        Fluid (ml/day):  ; Weight Used for Fluid Requirements:  Current      Nutrition Related Findings:  intermittent confusion, abd WDL, no noted edema, +I/Os      Wounds:  Surgical Wound       Current Nutrition Therapies:    DIET GENERAL; No Caffeine  Dietary Nutrition Supplements: Low Calorie High Protein Supplement  Dietary Nutrition Supplements: Wound Healing Oral Supplement    Anthropometric Measures:  · Height: 5' 3\" (160 cm)  · Current Body Weight: 199 lb (90.3 kg)(bed scale 8/28, request to RN for updated CBW)   · Admission Body Weight: 201 lb (91.2 kg)(measured 8/23)    · Usual Body Weight: (UTO ; no weights available in EMR history from previous encounters)     · Ideal Body Weight: 115 lbs; % Ideal Body Weight 173 %   · BMI: 35.3  · BMI Categories: Obese Class 2 (BMI 35.0 -39.9)       Nutrition Diagnosis:   · Increased nutrient needs related to increase demand for energy/nutrients as evidenced by wounds      Nutrition Interventions:   Food and/or Nutrient Delivery:  Continue Current Diet, Continue Oral Nutrition Supplement  Nutrition Education/Counseling:  Education not indicated   Coordination of Nutrition Care:  Continued Inpatient Monitoring    Goals:  Patient will consume ~75% of meals served       Nutrition Monitoring and Evaluation:   Food/Nutrient Intake Outcomes:  Food and Nutrient Intake, Supplement Intake  Physical Signs/Symptoms Outcomes:  Biochemical Data, GI Status, Fluid Status or Edema, Nutrition Focused Physical Findings, Skin, Weight     Discharge Planning:     Too soon to determine     Electronically signed by aMry Sam MS, RD, LD on 9/4/20 at 2:10 PM EDT    Contact: [phone]
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
## 2020-09-04 NOTE — PROGRESS NOTES
PO#6  PROCEDURES:  1.  Frameless stereotactic left parietooccipital craniotomy for the  excision of large meningioma. 2.  Excision of large sebaceous cyst, left occipital scalp. Doing very well. Cognition has improved a lot. No aggressive behavior.   More responsive today. Had CTA which revealed Basilar artery aneurysm. Spoke with Dr Candice Kuhn who will be glad to consider coiling of the aneurysm              Nothing new to add from neurosurgical stand point at this time.

## 2020-09-04 NOTE — PROGRESS NOTES
OT BEDSIDE TREATMENT NOTE      Date:2020  Patient Name: Brooke Casey  MRN: [id number]  : 1960  Room: [facility]A     Per OT Eval:    Referring Provider: REAGAN Alicia - CNP      Evaluating OT: Simon Russell OTR/L #[id number]     AM-PAC Daily Activity Raw Score: 15/24     Modified Sierra Scale   Score     Description  0             No symptoms  1             No significant disability despite symptoms  2             Slight disability; able to look after own affairs  3             Moderate disability; able to ambulate without assist/ requires assist with ADLs  4             Moderate/Severe disability;requires assist to ambulate/assist with ADLs  5             Severe disability;bedridden/incontinent   6               Score:   4     Recommended Adaptive Equipment: To be further assessed       Diagnosis: non-ST elevation MI   PROCEDURES: 20  1.  Frameless stereotactic left parietooccipital craniotomy for the  excision of large meningioma. 2.  Excision of large sebaceous cyst, left occipital scalp. Pertinent Medical History: HTN, lumbar herniated disc     Precautions:  Falls, bed alarm,   visual deficits were baseline per nsg, Poor peripheral vision, Decreased vision noted on R more so than L     Home Living: poor historian d/t confusion Per pt's brother (shin) Pt lives alone ,( pt's  is filing for a divorce)  in a lower level apt with 5 step(s) to enter and 1 rail(s); Her brother says her living conditions are deplorable. Bathroom setup: tub shower  Equipment owned: walking stick  Prior Level of Function: per brother, pt has been progressively declining and has difficulty with ADLs and  with IADLs; using walking stick for ambulation.    Driving: no     Pain Level: initially reported none, then reported headache 8/10, informed nsg, appeared to be comfortable during treatment, talkative   Cognition: A&O x 4, pleasant & cooperative, motivated              Memory: Sp [place] Sequencing:  poor              Problem solving:  poor              Judgement/safety:  poor                 Functional Assessment:    Initial Eval Status  Date: 8/31/20 Treatment Status  Date:  9/4/20 STG/LTG  Frequency/duration  2-3x/week, 5-7 days   Feeding Maximal Assist   *Impaired vision and cognition for all ADLS     SBA  Pt able to locate items on tray with increased time Minimal Assist    Grooming Maximal/dep Assist   SBA  Seated in chair, able to wash off face & hands without cues, required cues to locate wash cloth due to vision  Minimal Assist    UB Dressing Maximal/dep Assist   Min A  Doff & vladimir gown, assist needed to locate arm hold for R UE Minimal Assist    LB Dressing Dependent   Min socks  Mod pants  (simulated)  Pt able to doff & vladimir socks using cross over technique with Min A, cues to straighten socks so gripper is on the bottom Moderate Assist    Bathing Maximal/dep Assist  Simulated with hand over hand assist  Mod A  Simulated task, breakfast arrived Moderate Assist    Toileting Dependent   Incontinent of urine  Pt verbalized request to go to bathroom but unable to follow directions for bed mobility-  bed pan used for safety. Dependent hygiene.  NT Moderate Assist    Bed Mobility  Supine to sit: Maximal Assist   Sit to supine: Maximal Assist   SBA  Supine to sit, pt using bed rail cued to sit up to Right side of bed with Good results  Supine to sit: Minimal Assist   Sit to supine: Minimal Assist    Functional Transfers Sit to stand:  Moderate Assist   Stand to sit: Moderate Assist   Stand pivot: NT  Min A  Sit < > stand  Contact Guard Assist    Functional Mobility  (Ambulation) Pt unable to follow directions for side steps with ww.  Mod A  Hand held assist, taking a few steps from EOB to chair Min A with ww  Short distance   Balance Sitting:     Static:  SBA to min A    Dynamic:min A  Standing: mod A  Sitting: SBA  Standing: Min A     Activity Tolerance Poor  Very lethargic  Fair  Alert, talkative  Fair      Visual/  Perceptual Glasses: yes  Appears blind B eyes  No reflexive eyelid closure with stimulus     Pt wears glasses  Chronic Poor visual deficits per nsg  Appears worse vision on the Right, continue to assess        BUE  ROM/Strength/  Fine motor Coordination RUE: ROM WFL     Strength: grossly 3/5      Strength: fair     Coordination: poor     LUE: ROM  WFL     Strength: grossly 3/5      Strength: fair     Coordination: poor  B  strength: Fair  B Coordination: Fair        Education:  Pt was educated through out treatment regarding proper technique & safety with bed mobility, functional transfers, & instruction on visual compensatory strategies strategies to ease tasks to improve safety & prevent falls and allow pt to return home safely. Comments: Upon arrival pt was in bed & agreeable to therapy, nsg approved. At end of session pt was seated in chair, TSM present, nsg informed, all lines and tubes intact, call light within reach. Pt able to recall to not get up without assist & which button to push for help. Ensured location again of phone & call light with pt able to reach over & locate. · Pt has made Good progress towards set goals. · Continue with current plan of care      Treatment Time In: 8:30           Treatment Time Out: 9:00              Treatment Charges: Mins Units   Ther Ex  [id number]     Manual Therapy 25049     Thera Activities [id number]     ADL/Home Mgt 09660 30 2   Neuro Re-ed [id number]     Group Therapy      Orthotic manage/training  [id number]     Non-Billable Time     Total Timed Treatment 30 2       Michelle BALL  [address] [place] [address], [address] [place] [address]

## 2020-09-04 NOTE — PROGRESS NOTES
Spoke with Marie Tolliver in ir.  She will check with Dr Billy Whitt concerning scheduling of coiling and call back

## 2020-09-05 LAB
ANION GAP SERPL CALCULATED.3IONS-SCNC: 13 MMOL/L (ref 7–16)
BUN BLDV-MCNC: 19 MG/DL (ref 8–23)
CALCIUM SERPL-MCNC: 8.7 MG/DL (ref 8.6–10.2)
CHLORIDE BLD-SCNC: 99 MMOL/L (ref 98–107)
CO2: 25 MMOL/L (ref 22–29)
CREAT SERPL-MCNC: 0.5 MG/DL (ref 0.5–1)
GFR AFRICAN AMERICAN: >60
GFR NON-AFRICAN AMERICAN: >60 ML/MIN/1.73
GLUCOSE BLD-MCNC: 107 MG/DL (ref 74–99)
HCT VFR BLD CALC: 32.4 % (ref 34–48)
HEMOGLOBIN: 10.4 G/DL (ref 11.5–15.5)
MAGNESIUM: 2.2 MG/DL (ref 1.6–2.6)
MCH RBC QN AUTO: 28.7 PG (ref 26–35)
MCHC RBC AUTO-ENTMCNC: 32.1 % (ref 32–34.5)
MCV RBC AUTO: 89.5 FL (ref 80–99.9)
PDW BLD-RTO: 13 FL (ref 11.5–15)
PLATELET # BLD: 321 E9/L (ref 130–450)
PMV BLD AUTO: 10.8 FL (ref 7–12)
POTASSIUM SERPL-SCNC: 4 MMOL/L (ref 3.5–5)
RBC # BLD: 3.62 E12/L (ref 3.5–5.5)
SODIUM BLD-SCNC: 137 MMOL/L (ref 132–146)
WBC # BLD: 12.3 E9/L (ref 4.5–11.5)

## 2020-09-05 PROCEDURE — 6370000000 HC RX 637 (ALT 250 FOR IP): Performed by: NURSE PRACTITIONER

## 2020-09-05 PROCEDURE — 36415 COLL VENOUS BLD VENIPUNCTURE: CPT

## 2020-09-05 PROCEDURE — 85027 COMPLETE CBC AUTOMATED: CPT

## 2020-09-05 PROCEDURE — 80048 BASIC METABOLIC PNL TOTAL CA: CPT

## 2020-09-05 PROCEDURE — 2580000003 HC RX 258: Performed by: NURSE PRACTITIONER

## 2020-09-05 PROCEDURE — 83735 ASSAY OF MAGNESIUM: CPT

## 2020-09-05 PROCEDURE — 2060000000 HC ICU INTERMEDIATE R&B

## 2020-09-05 RX ADMIN — ENALAPRIL MALEATE 10 MG: 10 TABLET ORAL at 08:42

## 2020-09-05 RX ADMIN — LEVETIRACETAM 500 MG: 500 TABLET ORAL at 20:33

## 2020-09-05 RX ADMIN — METOPROLOL SUCCINATE 50 MG: 50 TABLET, EXTENDED RELEASE ORAL at 08:43

## 2020-09-05 RX ADMIN — MAGNESIUM GLUCONATE 500 MG ORAL TABLET 400 MG: 500 TABLET ORAL at 08:43

## 2020-09-05 RX ADMIN — ACETAMINOPHEN 1000 MG: 500 TABLET ORAL at 08:42

## 2020-09-05 RX ADMIN — Medication 10 ML: at 20:33

## 2020-09-05 RX ADMIN — POTASSIUM BICARBONATE 20 MEQ: 782 TABLET, EFFERVESCENT ORAL at 08:43

## 2020-09-05 RX ADMIN — LEVETIRACETAM 500 MG: 500 TABLET ORAL at 08:43

## 2020-09-05 RX ADMIN — ATORVASTATIN CALCIUM 80 MG: 80 TABLET, FILM COATED ORAL at 20:35

## 2020-09-05 ASSESSMENT — PAIN DESCRIPTION - ONSET: ONSET: ON-GOING

## 2020-09-05 ASSESSMENT — PAIN SCALES - GENERAL
PAINLEVEL_OUTOF10: 3
PAINLEVEL_OUTOF10: 0

## 2020-09-05 ASSESSMENT — PAIN DESCRIPTION - DESCRIPTORS: DESCRIPTORS: ACHING;DISCOMFORT;HEADACHE

## 2020-09-05 ASSESSMENT — PAIN DESCRIPTION - ORIENTATION: ORIENTATION: MID

## 2020-09-05 ASSESSMENT — PAIN DESCRIPTION - PROGRESSION: CLINICAL_PROGRESSION: NOT CHANGED

## 2020-09-05 ASSESSMENT — PAIN DESCRIPTION - LOCATION: LOCATION: HEAD

## 2020-09-05 ASSESSMENT — PAIN - FUNCTIONAL ASSESSMENT: PAIN_FUNCTIONAL_ASSESSMENT: ACTIVITIES ARE NOT PREVENTED

## 2020-09-05 ASSESSMENT — PAIN DESCRIPTION - FREQUENCY: FREQUENCY: INTERMITTENT

## 2020-09-05 ASSESSMENT — PAIN DESCRIPTION - PAIN TYPE: TYPE: ACUTE PAIN

## 2020-09-05 NOTE — PLAN OF CARE
Notes reviewed. Basilar aneurysm coiling is planned for next Tuesday. No other acute events and BPs are fairly well controlled.  NSGY still following    Neuro will sign off at this time as-please call with any new issues    Rest per last progress note    Follow-up with me in the office in 2 weeks    Huong Courser DAYSI  3:36 PM

## 2020-09-05 NOTE — PROGRESS NOTES
PO#7  PROCEDURES:  1.  Frameless stereotactic left parietooccipital craniotomy for the  excision of large meningioma. 2.  Excision of large sebaceous cyst, left occipital scalp. Doing very well. Cognition has improved a lot. No aggressive behavior.   More responsive today. Dr Yarelis Herring will be coiling of the aneurysm on Tuesday               Nothing new to add from neurosurgical stand point at this time.
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
## 2020-09-06 LAB
ANION GAP SERPL CALCULATED.3IONS-SCNC: 14 MMOL/L (ref 7–16)
BUN BLDV-MCNC: 18 MG/DL (ref 8–23)
CALCIUM SERPL-MCNC: 8.9 MG/DL (ref 8.6–10.2)
CHLORIDE BLD-SCNC: 101 MMOL/L (ref 98–107)
CO2: 23 MMOL/L (ref 22–29)
CREAT SERPL-MCNC: 0.5 MG/DL (ref 0.5–1)
GFR AFRICAN AMERICAN: >60
GFR NON-AFRICAN AMERICAN: >60 ML/MIN/1.73
GLUCOSE BLD-MCNC: 96 MG/DL (ref 74–99)
HCT VFR BLD CALC: 32.7 % (ref 34–48)
HEMOGLOBIN: 10.5 G/DL (ref 11.5–15.5)
MAGNESIUM: 2.2 MG/DL (ref 1.6–2.6)
MCH RBC QN AUTO: 28.8 PG (ref 26–35)
MCHC RBC AUTO-ENTMCNC: 32.1 % (ref 32–34.5)
MCV RBC AUTO: 89.8 FL (ref 80–99.9)
PDW BLD-RTO: 13.1 FL (ref 11.5–15)
PLATELET # BLD: 342 E9/L (ref 130–450)
PMV BLD AUTO: 11 FL (ref 7–12)
POTASSIUM SERPL-SCNC: 4.2 MMOL/L (ref 3.5–5)
RBC # BLD: 3.64 E12/L (ref 3.5–5.5)
SODIUM BLD-SCNC: 138 MMOL/L (ref 132–146)
WBC # BLD: 13 E9/L (ref 4.5–11.5)

## 2020-09-06 PROCEDURE — 2060000000 HC ICU INTERMEDIATE R&B

## 2020-09-06 PROCEDURE — 6370000000 HC RX 637 (ALT 250 FOR IP): Performed by: NURSE PRACTITIONER

## 2020-09-06 PROCEDURE — 80048 BASIC METABOLIC PNL TOTAL CA: CPT

## 2020-09-06 PROCEDURE — 83735 ASSAY OF MAGNESIUM: CPT

## 2020-09-06 PROCEDURE — 36415 COLL VENOUS BLD VENIPUNCTURE: CPT

## 2020-09-06 PROCEDURE — 85027 COMPLETE CBC AUTOMATED: CPT

## 2020-09-06 PROCEDURE — 2580000003 HC RX 258: Performed by: NURSE PRACTITIONER

## 2020-09-06 RX ADMIN — LEVETIRACETAM 500 MG: 500 TABLET ORAL at 08:32

## 2020-09-06 RX ADMIN — POTASSIUM BICARBONATE 20 MEQ: 782 TABLET, EFFERVESCENT ORAL at 08:32

## 2020-09-06 RX ADMIN — ATORVASTATIN CALCIUM 80 MG: 80 TABLET, FILM COATED ORAL at 20:49

## 2020-09-06 RX ADMIN — ENALAPRIL MALEATE 10 MG: 10 TABLET ORAL at 08:32

## 2020-09-06 RX ADMIN — LEVETIRACETAM 500 MG: 500 TABLET ORAL at 20:49

## 2020-09-06 RX ADMIN — MAGNESIUM GLUCONATE 500 MG ORAL TABLET 400 MG: 500 TABLET ORAL at 08:32

## 2020-09-06 RX ADMIN — METOPROLOL SUCCINATE 50 MG: 50 TABLET, EXTENDED RELEASE ORAL at 08:32

## 2020-09-06 RX ADMIN — Medication 10 ML: at 20:49

## 2020-09-06 RX ADMIN — Medication 10 ML: at 08:32

## 2020-09-06 ASSESSMENT — PAIN SCALES - GENERAL
PAINLEVEL_OUTOF10: 0
PAINLEVEL_OUTOF10: 0
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
## 2020-09-07 PROCEDURE — 2580000003 HC RX 258: Performed by: NURSE PRACTITIONER

## 2020-09-07 PROCEDURE — 6370000000 HC RX 637 (ALT 250 FOR IP): Performed by: NURSE PRACTITIONER

## 2020-09-07 PROCEDURE — 97530 THERAPEUTIC ACTIVITIES: CPT

## 2020-09-07 PROCEDURE — 97535 SELF CARE MNGMENT TRAINING: CPT

## 2020-09-07 PROCEDURE — 2060000000 HC ICU INTERMEDIATE R&B

## 2020-09-07 RX ADMIN — MAGNESIUM GLUCONATE 500 MG ORAL TABLET 400 MG: 500 TABLET ORAL at 10:58

## 2020-09-07 RX ADMIN — ATORVASTATIN CALCIUM 80 MG: 80 TABLET, FILM COATED ORAL at 19:48

## 2020-09-07 RX ADMIN — ACETAMINOPHEN 1000 MG: 500 TABLET ORAL at 14:11

## 2020-09-07 RX ADMIN — ENALAPRIL MALEATE 10 MG: 10 TABLET ORAL at 10:58

## 2020-09-07 RX ADMIN — METOPROLOL SUCCINATE 50 MG: 50 TABLET, EXTENDED RELEASE ORAL at 10:59

## 2020-09-07 RX ADMIN — Medication 10 ML: at 10:59

## 2020-09-07 RX ADMIN — ACETAMINOPHEN 1000 MG: 500 TABLET ORAL at 23:47

## 2020-09-07 RX ADMIN — LEVETIRACETAM 500 MG: 500 TABLET ORAL at 10:59

## 2020-09-07 RX ADMIN — HYDROCODONE BITARTRATE AND ACETAMINOPHEN 1 TABLET: 5; 325 TABLET ORAL at 19:46

## 2020-09-07 RX ADMIN — Medication 10 ML: at 19:48

## 2020-09-07 RX ADMIN — POTASSIUM BICARBONATE 20 MEQ: 782 TABLET, EFFERVESCENT ORAL at 12:24

## 2020-09-07 RX ADMIN — LEVETIRACETAM 500 MG: 500 TABLET ORAL at 19:47

## 2020-09-07 ASSESSMENT — PAIN DESCRIPTION - DESCRIPTORS: DESCRIPTORS: ACHING;CONSTANT;DISCOMFORT

## 2020-09-07 ASSESSMENT — PAIN SCALES - GENERAL
PAINLEVEL_OUTOF10: 7
PAINLEVEL_OUTOF10: 7
PAINLEVEL_OUTOF10: 4
PAINLEVEL_OUTOF10: 3
PAINLEVEL_OUTOF10: 7

## 2020-09-07 ASSESSMENT — PAIN DESCRIPTION - LOCATION
LOCATION: HEAD

## 2020-09-07 ASSESSMENT — PAIN DESCRIPTION - PAIN TYPE
TYPE: SURGICAL PAIN
TYPE: SURGICAL PAIN
TYPE: ACUTE PAIN

## 2020-09-07 NOTE — PROGRESS NOTES
Sequencing:  fair              Problem solving:  fair              Judgement/safety:  fair                Functional Assessment:    Initial Eval Status  Date: 8/31/20 Treatment Status  Date:  9/7/20 STG/LTG  Frequency/duration  2-3x/week, 5-7 days   Feeding Maximal Assist   *Impaired vision and cognition for all ADLS    Min A  To open packages. Pt able to locate items on tray with increased time and occasional prompts. Minimal Assist    Grooming Maximal/dep Assist   SBA  Seated in chair, able to wash off face & hands without cues,comb hair,    required cues to locate wash cloth due to vision  Minimal Assist    UB Dressing Maximal/dep Assist   Min A  Doff & vladimir gown, assist needed to locate arm hold for B UE Minimal Assist    LB Dressing Dependent   Min socks  Pt able to doff & vladimir socks using figure 4 cross over technique  Moderate Assist    Bathing Maximal/dep Assist  Simulated with hand over hand assist  Mod A  Sitting/standing EOB  Cues for sequencing and to attend to task Moderate Assist    Toileting Dependent   Incontinent of urine  Pt verbalized request to go to bathroom but unable to follow directions for bed mobility-  bed pan used for safety. Dependent hygiene.  NT Moderate Assist    Bed Mobility  Supine to sit: Maximal Assist   Sit to supine: Maximal Assist   SBA  Supine to sit, pt using bed rail cued to sit up to Right side of bed with Good results  Supine to sit: Minimal Assist   Sit to supine: Minimal Assist    Functional Transfers Sit to stand:  Moderate Assist   Stand to sit: Moderate Assist   Stand pivot: NT  Min A  Sit < > stand   And pivot to chair with ww Contact Guard Assist    Functional Mobility  (Ambulation) Pt unable to follow directions for side steps with ww.  Min A with ww  taking a few steps from EOB to chair Min A with ww  Short distance   Balance Sitting:     Static:  SBA to min A    Dynamic:min A  Standing: mod A  Sitting: SBA  Standing: Min A     Activity Tolerance Poor  Very lethargic  Fair  Alert, talkative  Fair      Visual/  Perceptual Glasses: yes  Appears blind B eyes  No reflexive eyelid closure with stimulus     Pt wears glasses  Chronic Poor visual deficits   Appears worse vision on the Right, continue to assess        BUE  ROM/Strength/  Fine motor Coordination RUE: ROM WFL     Strength: grossly 3/5      Strength: fair     Coordination: poor     LUE: ROM  WFL     Strength: grossly 3/5      Strength: fair     Coordination: poor  B  strength: Fair  B Coordination: Fair        Education:  Pt was educated through out treatment regarding proper technique & safety with bed mobility, functional transfers, & instruction on visual compensatory strategies strategies to ease tasks to improve safety & prevent falls and allow pt to return home safely. Comments: Upon arrival pt was in bed & agreeable to therapy, nsg approved. At end of session pt was seated in chair, TSM present, nsg informed, all lines and tubes intact, call light within reach. Pt able to recall to not get up without assist & which button to push for help. Ensured location again of phone & call light with pt able to reach over & locate. · Pt has made Good progress towards set goals.    · Continue with current plan of care      Treatment Time In: 792          Treatment Time Out: 6330              Treatment Charges: Mins Units   Ther Ex  [id number]     Manual Therapy Sam Latrell [phone] [id number] [phone]   ADL/Home Mgt [id number] 30 2   Neuro Re-ed [id number]     Group Therapy      Orthotic manage/training  [id number]     Non-Billable Time     Total Timed Treatment 40 Sam Bryant [address] [place] [id number]

## 2020-09-07 NOTE — PLAN OF CARE
Problem: Falls - Risk of:  Goal: Will remain free from falls  Description: Will remain free from falls  Outcome: Met This Shift  Goal: Absence of physical injury  Description: Absence of physical injury  Outcome: Met This Shift     Problem: Skin Integrity:  Goal: Will show no infection signs and symptoms  Description: Will show no infection signs and symptoms  Outcome: Met This Shift  Goal: Absence of new skin breakdown  Description: Absence of new skin breakdown  Outcome: Met This Shift     Problem: Pain:  Goal: Pain level will decrease  Description: Pain level will decrease  Outcome: Met This Shift  Goal: Control of acute pain  Description: Control of acute pain  Outcome: Met This Shift  Goal: Control of chronic pain  Description: Control of chronic pain  Outcome: Met This Shift     Problem: Discharge Planning:  Goal: Participates in care planning  Description: Participates in care planning  Outcome: Met This Shift  Goal: Discharged to appropriate level of care  Description: Discharged to appropriate level of care  Outcome: Met This Shift     Problem: Airway Clearance - Ineffective:  Goal: Ability to maintain a clear airway will improve  Description: Ability to maintain a clear airway will improve  Outcome: Met This Shift     Problem: Anxiety/Stress:  Goal: Level of anxiety will decrease  Description: Level of anxiety will decrease  Outcome: Met This Shift     Problem: Aspiration:  Goal: Absence of aspiration  Description: Absence of aspiration  Outcome: Met This Shift     Problem:  Bowel Function - Altered:  Goal: Bowel elimination is within specified parameters  Description: Bowel elimination is within specified parameters  Outcome: Met This Shift     Problem: Cardiac Output - Decreased:  Goal: Hemodynamic stability will improve  Description: Hemodynamic stability will improve  Outcome: Met This Shift     Problem: Fluid Volume - Imbalance:  Goal: Absence of imbalanced fluid volume signs and symptoms  Description: Absence of imbalanced fluid volume signs and symptoms  Outcome: Met This Shift     Problem: Gas Exchange - Impaired:  Goal: Levels of oxygenation will improve  Description: Levels of oxygenation will improve  Outcome: Met This Shift     Problem: Mental Status - Impaired:  Goal: Mental status will be restored to baseline  Description: Mental status will be restored to baseline  Outcome: Met This Shift     Problem: Nutrition Deficit:  Goal: Ability to achieve adequate nutritional intake will improve  Description: Ability to achieve adequate nutritional intake will improve  Outcome: Met This Shift     Problem: Pain:  Goal: Recognizes and communicates pain  Description: Recognizes and communicates pain  Outcome: Met This Shift  Goal: Control of acute pain  Description: Control of acute pain  Outcome: Met This Shift  Goal: Control of chronic pain  Description: Control of chronic pain  Outcome: Met This Shift     Problem: Serum Glucose Level - Abnormal:  Goal: Ability to maintain appropriate glucose levels will improve to within specified parameters  Description: Ability to maintain appropriate glucose levels will improve to within specified parameters  Outcome: Met This Shift     Problem: Skin Integrity - Impaired:  Goal: Will show no infection signs and symptoms  Description: Will show no infection signs and symptoms  Outcome: Met This Shift  Goal: Absence of new skin breakdown  Description: Absence of new skin breakdown  Outcome: Met This Shift     Problem: Sleep Pattern Disturbance:  Goal: Appears well-rested  Description: Appears well-rested  Outcome: Met This Shift     Problem: Tissue Perfusion, Altered:  Goal: Circulatory function within specified parameters  Description: Circulatory function within specified parameters  Outcome: Met This Shift     Problem: Tissue Perfusion - Cardiopulmonary, Altered:  Goal: Absence of angina  Description: Absence of angina  Outcome: Met This Shift  Goal: Hemodynamic stability will improve  Description: Hemodynamic stability will improve  Outcome: Met This Shift
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
## 2020-09-08 ENCOUNTER — ANESTHESIA EVENT (OUTPATIENT)
Dept: INTERVENTIONAL RADIOLOGY/VASCULAR | Age: 60
DRG: 025 | End: 2020-09-08
Payer: COMMERCIAL

## 2020-09-08 ENCOUNTER — APPOINTMENT (OUTPATIENT)
Dept: CT IMAGING | Age: 60
DRG: 025 | End: 2020-09-08
Payer: COMMERCIAL

## 2020-09-08 ENCOUNTER — APPOINTMENT (OUTPATIENT)
Dept: INTERVENTIONAL RADIOLOGY/VASCULAR | Age: 60
DRG: 025 | End: 2020-09-08
Payer: COMMERCIAL

## 2020-09-08 ENCOUNTER — ANESTHESIA (OUTPATIENT)
Dept: INTERVENTIONAL RADIOLOGY/VASCULAR | Age: 60
DRG: 025 | End: 2020-09-08
Payer: COMMERCIAL

## 2020-09-08 VITALS — SYSTOLIC BLOOD PRESSURE: 82 MMHG | OXYGEN SATURATION: 100 % | DIASTOLIC BLOOD PRESSURE: 68 MMHG

## 2020-09-08 LAB
ANION GAP SERPL CALCULATED.3IONS-SCNC: 12 MMOL/L (ref 7–16)
BUN BLDV-MCNC: 26 MG/DL (ref 8–23)
CALCIUM SERPL-MCNC: 8.9 MG/DL (ref 8.6–10.2)
CHLORIDE BLD-SCNC: 100 MMOL/L (ref 98–107)
CO2: 26 MMOL/L (ref 22–29)
CREAT SERPL-MCNC: 0.6 MG/DL (ref 0.5–1)
GFR AFRICAN AMERICAN: >60
GFR NON-AFRICAN AMERICAN: >60 ML/MIN/1.73
GLUCOSE BLD-MCNC: 109 MG/DL (ref 74–99)
HCT VFR BLD CALC: 31.8 % (ref 34–48)
HEMOGLOBIN: 10 G/DL (ref 11.5–15.5)
INR BLD: 1.2
MCH RBC QN AUTO: 28.7 PG (ref 26–35)
MCHC RBC AUTO-ENTMCNC: 31.4 % (ref 32–34.5)
MCV RBC AUTO: 91.1 FL (ref 80–99.9)
PDW BLD-RTO: 13.3 FL (ref 11.5–15)
PLATELET # BLD: 340 E9/L (ref 130–450)
PMV BLD AUTO: 10.6 FL (ref 7–12)
POTASSIUM SERPL-SCNC: 4.2 MMOL/L (ref 3.5–5)
PROTHROMBIN TIME: 13.2 SEC (ref 9.3–12.4)
RBC # BLD: 3.49 E12/L (ref 3.5–5.5)
SODIUM BLD-SCNC: 138 MMOL/L (ref 132–146)
WBC # BLD: 13.8 E9/L (ref 4.5–11.5)

## 2020-09-08 PROCEDURE — 36226 PLACE CATH VERTEBRAL ART: CPT

## 2020-09-08 PROCEDURE — 75898 FOLLOW-UP ANGIOGRAPHY: CPT

## 2020-09-08 PROCEDURE — 2000000000 HC ICU R&B

## 2020-09-08 PROCEDURE — 70450 CT HEAD/BRAIN W/O DYE: CPT

## 2020-09-08 PROCEDURE — 2500000003 HC RX 250 WO HCPCS: Performed by: NURSE ANESTHETIST, CERTIFIED REGISTERED

## 2020-09-08 PROCEDURE — 6360000002 HC RX W HCPCS: Performed by: RADIOLOGY

## 2020-09-08 PROCEDURE — 6360000002 HC RX W HCPCS

## 2020-09-08 PROCEDURE — 75894 X-RAYS TRANSCATH THERAPY: CPT

## 2020-09-08 PROCEDURE — 2580000003 HC RX 258: Performed by: NURSE PRACTITIONER

## 2020-09-08 PROCEDURE — 6360000002 HC RX W HCPCS: Performed by: NURSE PRACTITIONER

## 2020-09-08 PROCEDURE — 85027 COMPLETE CBC AUTOMATED: CPT

## 2020-09-08 PROCEDURE — 6370000000 HC RX 637 (ALT 250 FOR IP): Performed by: NURSE PRACTITIONER

## 2020-09-08 PROCEDURE — 61624 TCAT PERM OCCLS/EMBOLJ CNS: CPT

## 2020-09-08 PROCEDURE — 2580000003 HC RX 258

## 2020-09-08 PROCEDURE — 03VG3DZ RESTRICTION OF INTRACRANIAL ARTERY WITH INTRALUMINAL DEVICE, PERCUTANEOUS APPROACH: ICD-10-PCS | Performed by: RADIOLOGY

## 2020-09-08 PROCEDURE — 6360000002 HC RX W HCPCS: Performed by: NURSE ANESTHETIST, CERTIFIED REGISTERED

## 2020-09-08 PROCEDURE — 2780000010 IR SEL CATH PLACE VERTEBRAL ART RIGHT W OR WO ANGIO

## 2020-09-08 PROCEDURE — 3700000001 HC ADD 15 MINUTES (ANESTHESIA)

## 2020-09-08 PROCEDURE — 6360000004 HC RX CONTRAST MEDICATION: Performed by: RADIOLOGY

## 2020-09-08 PROCEDURE — 2500000003 HC RX 250 WO HCPCS

## 2020-09-08 PROCEDURE — 2700000000 HC OXYGEN THERAPY PER DAY

## 2020-09-08 PROCEDURE — 80048 BASIC METABOLIC PNL TOTAL CA: CPT

## 2020-09-08 PROCEDURE — 2580000003 HC RX 258: Performed by: NURSE ANESTHETIST, CERTIFIED REGISTERED

## 2020-09-08 PROCEDURE — 36228 PLACE CATH INTRACRANIAL ART: CPT

## 2020-09-08 PROCEDURE — 85610 PROTHROMBIN TIME: CPT

## 2020-09-08 PROCEDURE — 3700000000 HC ANESTHESIA ATTENDED CARE

## 2020-09-08 PROCEDURE — 36415 COLL VENOUS BLD VENIPUNCTURE: CPT

## 2020-09-08 RX ORDER — GLYCOPYRROLATE 1 MG/5 ML
SYRINGE (ML) INTRAVENOUS PRN
Status: DISCONTINUED | OUTPATIENT
Start: 2020-09-08 | End: 2020-09-08 | Stop reason: SDUPTHER

## 2020-09-08 RX ORDER — LIDOCAINE HYDROCHLORIDE 20 MG/ML
INJECTION, SOLUTION INFILTRATION; PERINEURAL PRN
Status: DISCONTINUED | OUTPATIENT
Start: 2020-09-08 | End: 2020-09-08 | Stop reason: SDUPTHER

## 2020-09-08 RX ORDER — MEPERIDINE HYDROCHLORIDE 25 MG/ML
12.5 INJECTION INTRAMUSCULAR; INTRAVENOUS; SUBCUTANEOUS EVERY 5 MIN PRN
Status: DISCONTINUED | OUTPATIENT
Start: 2020-09-08 | End: 2020-09-09

## 2020-09-08 RX ORDER — HEPARIN SODIUM (PORCINE) LOCK FLUSH IV SOLN 100 UNIT/ML 100 UNIT/ML
SOLUTION INTRAVENOUS
Status: COMPLETED | OUTPATIENT
Start: 2020-09-08 | End: 2020-09-08

## 2020-09-08 RX ORDER — FENTANYL CITRATE 50 UG/ML
INJECTION, SOLUTION INTRAMUSCULAR; INTRAVENOUS PRN
Status: DISCONTINUED | OUTPATIENT
Start: 2020-09-08 | End: 2020-09-08 | Stop reason: SDUPTHER

## 2020-09-08 RX ORDER — LABETALOL HYDROCHLORIDE 5 MG/ML
INJECTION, SOLUTION INTRAVENOUS PRN
Status: DISCONTINUED | OUTPATIENT
Start: 2020-09-08 | End: 2020-09-08 | Stop reason: SDUPTHER

## 2020-09-08 RX ORDER — SODIUM CHLORIDE 9 MG/ML
INJECTION, SOLUTION INTRAVENOUS CONTINUOUS PRN
Status: DISCONTINUED | OUTPATIENT
Start: 2020-09-08 | End: 2020-09-08 | Stop reason: SDUPTHER

## 2020-09-08 RX ORDER — LABETALOL HYDROCHLORIDE 5 MG/ML
5 INJECTION, SOLUTION INTRAVENOUS EVERY 10 MIN PRN
Status: DISCONTINUED | OUTPATIENT
Start: 2020-09-08 | End: 2020-09-11 | Stop reason: HOSPADM

## 2020-09-08 RX ORDER — VECURONIUM BROMIDE 1 MG/ML
INJECTION, POWDER, LYOPHILIZED, FOR SOLUTION INTRAVENOUS PRN
Status: DISCONTINUED | OUTPATIENT
Start: 2020-09-08 | End: 2020-09-08 | Stop reason: SDUPTHER

## 2020-09-08 RX ORDER — PROPOFOL 10 MG/ML
INJECTION, EMULSION INTRAVENOUS PRN
Status: DISCONTINUED | OUTPATIENT
Start: 2020-09-08 | End: 2020-09-08 | Stop reason: SDUPTHER

## 2020-09-08 RX ORDER — ONDANSETRON 2 MG/ML
INJECTION INTRAMUSCULAR; INTRAVENOUS PRN
Status: DISCONTINUED | OUTPATIENT
Start: 2020-09-08 | End: 2020-09-08 | Stop reason: SDUPTHER

## 2020-09-08 RX ORDER — PROMETHAZINE HYDROCHLORIDE 25 MG/ML
25 INJECTION, SOLUTION INTRAMUSCULAR; INTRAVENOUS PRN
Status: DISCONTINUED | OUTPATIENT
Start: 2020-09-08 | End: 2020-09-09

## 2020-09-08 RX ORDER — DEXAMETHASONE SODIUM PHOSPHATE 4 MG/ML
INJECTION, SOLUTION INTRA-ARTICULAR; INTRALESIONAL; INTRAMUSCULAR; INTRAVENOUS; SOFT TISSUE PRN
Status: DISCONTINUED | OUTPATIENT
Start: 2020-09-08 | End: 2020-09-08 | Stop reason: SDUPTHER

## 2020-09-08 RX ORDER — HEPARIN SODIUM 1000 [USP'U]/ML
INJECTION, SOLUTION INTRAVENOUS; SUBCUTANEOUS PRN
Status: DISCONTINUED | OUTPATIENT
Start: 2020-09-08 | End: 2020-09-08 | Stop reason: SDUPTHER

## 2020-09-08 RX ADMIN — LABETALOL HYDROCHLORIDE 5 MG: 5 INJECTION INTRAVENOUS at 13:12

## 2020-09-08 RX ADMIN — NEOSTIGMINE METHYLSULFATE 3 MG: 5 INJECTION, SOLUTION INTRAMUSCULAR; INTRAVENOUS; SUBCUTANEOUS at 13:02

## 2020-09-08 RX ADMIN — PHENYLEPHRINE HYDROCHLORIDE 100 MCG: 10 INJECTION INTRAVENOUS at 11:20

## 2020-09-08 RX ADMIN — VECURONIUM BROMIDE FOR INJECTION 6 MG: 1 INJECTION, POWDER, LYOPHILIZED, FOR SOLUTION INTRAVENOUS at 11:10

## 2020-09-08 RX ADMIN — HEPARIN SODIUM 5000 UNITS: 1000 INJECTION INTRAVENOUS; SUBCUTANEOUS at 12:43

## 2020-09-08 RX ADMIN — ATORVASTATIN CALCIUM 80 MG: 80 TABLET, FILM COATED ORAL at 21:45

## 2020-09-08 RX ADMIN — Medication 10 ML: at 21:00

## 2020-09-08 RX ADMIN — Medication 10 ML: at 08:57

## 2020-09-08 RX ADMIN — PROPOFOL 200 MG: 10 INJECTION, EMULSION INTRAVENOUS at 11:10

## 2020-09-08 RX ADMIN — IOVERSOL 100 ML: 678 INJECTION INTRA-ARTERIAL; INTRAVENOUS at 13:07

## 2020-09-08 RX ADMIN — LIDOCAINE HYDROCHLORIDE 100 MG: 20 INJECTION, SOLUTION INFILTRATION; PERINEURAL at 11:10

## 2020-09-08 RX ADMIN — HYDROCODONE BITARTRATE AND ACETAMINOPHEN 1 TABLET: 5; 325 TABLET ORAL at 17:56

## 2020-09-08 RX ADMIN — HYDRALAZINE HYDROCHLORIDE 5 MG: 20 INJECTION INTRAMUSCULAR; INTRAVENOUS at 15:25

## 2020-09-08 RX ADMIN — LEVETIRACETAM 500 MG: 500 TABLET ORAL at 21:45

## 2020-09-08 RX ADMIN — SODIUM CHLORIDE: 9 INJECTION, SOLUTION INTRAVENOUS at 11:45

## 2020-09-08 RX ADMIN — SODIUM CHLORIDE, PRESERVATIVE FREE 100 UNITS: 5 INJECTION INTRAVENOUS at 12:00

## 2020-09-08 RX ADMIN — ONDANSETRON HYDROCHLORIDE 4 MG: 2 INJECTION, SOLUTION INTRAMUSCULAR; INTRAVENOUS at 12:59

## 2020-09-08 RX ADMIN — FENTANYL CITRATE 100 MCG: 50 INJECTION, SOLUTION INTRAMUSCULAR; INTRAVENOUS at 11:10

## 2020-09-08 RX ADMIN — DEXAMETHASONE SODIUM PHOSPHATE 10 MG: 4 INJECTION, SOLUTION INTRAMUSCULAR; INTRAVENOUS at 11:17

## 2020-09-08 RX ADMIN — VECURONIUM BROMIDE FOR INJECTION 2 MG: 1 INJECTION, POWDER, LYOPHILIZED, FOR SOLUTION INTRAVENOUS at 12:10

## 2020-09-08 RX ADMIN — Medication 0.6 MG: at 13:02

## 2020-09-08 RX ADMIN — SODIUM CHLORIDE: 9 INJECTION, SOLUTION INTRAVENOUS at 10:55

## 2020-09-08 ASSESSMENT — PULMONARY FUNCTION TESTS
PIF_VALUE: 21
PIF_VALUE: 21
PIF_VALUE: 22
PIF_VALUE: 21
PIF_VALUE: 24
PIF_VALUE: 22
PIF_VALUE: 21
PIF_VALUE: 21
PIF_VALUE: 25
PIF_VALUE: 24
PIF_VALUE: 22
PIF_VALUE: 0
PIF_VALUE: 21
PIF_VALUE: 3
PIF_VALUE: 21
PIF_VALUE: 21
PIF_VALUE: 26
PIF_VALUE: 24
PIF_VALUE: 21
PIF_VALUE: 21
PIF_VALUE: 0
PIF_VALUE: 24
PIF_VALUE: 21
PIF_VALUE: 21
PIF_VALUE: 29
PIF_VALUE: 21
PIF_VALUE: 21
PIF_VALUE: 0
PIF_VALUE: 21
PIF_VALUE: 1
PIF_VALUE: 21
PIF_VALUE: 0
PIF_VALUE: 26
PIF_VALUE: 21
PIF_VALUE: 22
PIF_VALUE: 21
PIF_VALUE: 22
PIF_VALUE: 22
PIF_VALUE: 21
PIF_VALUE: 24
PIF_VALUE: 25
PIF_VALUE: 0
PIF_VALUE: 20
PIF_VALUE: 21
PIF_VALUE: 21
PIF_VALUE: 22
PIF_VALUE: 21
PIF_VALUE: 0
PIF_VALUE: 25
PIF_VALUE: 0
PIF_VALUE: 24
PIF_VALUE: 21
PIF_VALUE: 25
PIF_VALUE: 0
PIF_VALUE: 28
PIF_VALUE: 25
PIF_VALUE: 0
PIF_VALUE: 21
PIF_VALUE: 22
PIF_VALUE: 24
PIF_VALUE: 21
PIF_VALUE: 1
PIF_VALUE: 22
PIF_VALUE: 21
PIF_VALUE: 22
PIF_VALUE: 25
PIF_VALUE: 22
PIF_VALUE: 15
PIF_VALUE: 21
PIF_VALUE: 0
PIF_VALUE: 22
PIF_VALUE: 21
PIF_VALUE: 22
PIF_VALUE: 2
PIF_VALUE: 22
PIF_VALUE: 0
PIF_VALUE: 22
PIF_VALUE: 24
PIF_VALUE: 22
PIF_VALUE: 15
PIF_VALUE: 21
PIF_VALUE: 22
PIF_VALUE: 21
PIF_VALUE: 22
PIF_VALUE: 0
PIF_VALUE: 24
PIF_VALUE: 25
PIF_VALUE: 21
PIF_VALUE: 25
PIF_VALUE: 22
PIF_VALUE: 24
PIF_VALUE: 28
PIF_VALUE: 24
PIF_VALUE: 2
PIF_VALUE: 0
PIF_VALUE: 21
PIF_VALUE: 1
PIF_VALUE: 21
PIF_VALUE: 22
PIF_VALUE: 26
PIF_VALUE: 22
PIF_VALUE: 0
PIF_VALUE: 24
PIF_VALUE: 21
PIF_VALUE: 21
PIF_VALUE: 24
PIF_VALUE: 25
PIF_VALUE: 25
PIF_VALUE: 23
PIF_VALUE: 1
PIF_VALUE: 25
PIF_VALUE: 22
PIF_VALUE: 24
PIF_VALUE: 0
PIF_VALUE: 21
PIF_VALUE: 25
PIF_VALUE: 21
PIF_VALUE: 0
PIF_VALUE: 21
PIF_VALUE: 36
PIF_VALUE: 22
PIF_VALUE: 22
PIF_VALUE: 21
PIF_VALUE: 19
PIF_VALUE: 21
PIF_VALUE: 25
PIF_VALUE: 22
PIF_VALUE: 22
PIF_VALUE: 0

## 2020-09-08 ASSESSMENT — PAIN SCALES - GENERAL
PAINLEVEL_OUTOF10: 4
PAINLEVEL_OUTOF10: 6
PAINLEVEL_OUTOF10: 0
PAINLEVEL_OUTOF10: 6
PAINLEVEL_OUTOF10: 2
PAINLEVEL_OUTOF10: 6
PAINLEVEL_OUTOF10: 0

## 2020-09-08 ASSESSMENT — PAIN DESCRIPTION - PAIN TYPE
TYPE: ACUTE PAIN
TYPE: CHRONIC PAIN

## 2020-09-08 ASSESSMENT — PAIN DESCRIPTION - DESCRIPTORS
DESCRIPTORS: DISCOMFORT

## 2020-09-08 ASSESSMENT — PAIN DESCRIPTION - FREQUENCY
FREQUENCY: CONTINUOUS

## 2020-09-08 ASSESSMENT — PAIN DESCRIPTION - LOCATION
LOCATION: GENERALIZED
LOCATION: HEAD
LOCATION: GENERALIZED
LOCATION: GENERALIZED

## 2020-09-08 ASSESSMENT — PAIN DESCRIPTION - ORIENTATION: ORIENTATION: LEFT;ANTERIOR

## 2020-09-08 ASSESSMENT — PAIN DESCRIPTION - ONSET
ONSET: ON-GOING
ONSET: ON-GOING

## 2020-09-08 ASSESSMENT — PAIN - FUNCTIONAL ASSESSMENT: PAIN_FUNCTIONAL_ASSESSMENT: ACTIVITIES ARE NOT PREVENTED

## 2020-09-08 ASSESSMENT — LIFESTYLE VARIABLES: SMOKING_STATUS: 0

## 2020-09-08 ASSESSMENT — PAIN DESCRIPTION - PROGRESSION
CLINICAL_PROGRESSION: NOT CHANGED
CLINICAL_PROGRESSION: NOT CHANGED

## 2020-09-08 NOTE — PLAN OF CARE
Problem: Falls - Risk of:  Goal: Will remain free from falls  Description: Will remain free from falls  9/8/2020 1707 by Nehemias Reddy RN  Outcome: Met This Shift     Problem: Skin Integrity:  Goal: Will show no infection signs and symptoms  Description: Will show no infection signs and symptoms  9/8/2020 1707 by Nehemias Reddy RN  Outcome: Met This Shift     Problem: Pain:  Goal: Pain level will decrease  Description: Pain level will decrease  9/8/2020 1707 by Nehemias Reddy RN  Outcome: Met This Shift     Problem: Cardiac Output - Decreased:  Goal: Hemodynamic stability will improve  Description: Hemodynamic stability will improve  9/8/2020 1707 by Nehemias Reddy RN  Outcome: Met This Shift

## 2020-09-08 NOTE — INTERVAL H&P NOTE
H&P Update    Patient's History and Physical  was reviewed. The patient appears likely to able to tolerate the procedure. Risk and benefits discussed including ultimate complications, possibly death and consent obtained.     Tina Alves, II

## 2020-09-08 NOTE — ANESTHESIA PROCEDURE NOTES
Arterial Line:    An arterial line was placed using ultrasound guidance, in the OR for the following indication(s): continuous blood pressure monitoring and blood sampling needed. A 20 gauge (size), 1 and 3/4 inch (length), Arrow (type) catheter was placed, Seldinger technique used, into the left brachial artery, secured by tape and Tegaderm. Anesthesia type: General    Events:  patient tolerated procedure well with no complications.   9/8/2020 11:25 IT[birth year]/1/1967 11:40 AM  Anesthesiologist: Eleno Agustin MD  Resident/CRNA: Denisa Beard APRN - CRNA  Performed: Resident/CRNA   Preanesthetic Checklist  Completed: patient identified, site marked, surgical consent, pre-op evaluation, timeout performed, IV checked, risks and benefits discussed, monitors and equipment checked, anesthesia consent given, oxygen available and patient being monitored

## 2020-09-08 NOTE — PROGRESS NOTES
Patient arrived to the Angio hold room for procedure.  at her side. VS taken and stable, history and allergies reviewed. 1330  Patient transferred to [facility] after CT complete. Report given to Sally Hargrove RN.  Patient stable and  in the waiting room

## 2020-09-08 NOTE — BRIEF OP NOTE
Brief Postoperative Note    Edrie Tobin  YOB: 1960  [id number]    Pre-operative Diagnosis and Procedure: embo    Post-operative Diagnosis: Same    Anesthesia: Local    Estimated Blood Loss: < 10 cc    Surgeon: James AHN     Complications: none    Specimen obtained: none     Findings: none     Amos Carranza II   9/8/2020 11:57 AM

## 2020-09-08 NOTE — PROGRESS NOTES
0835  Spoke with James LEVINE, and received report on the patient. She is stable, /70, A&O, No blood thinners,and can sign her own consent. She had a Craniotomy on 8/29, and has post op sutures to the back of her head. She had a Meningioma removed that had been causing confusion and psychosis. Informed. James to call transport to have the patient in our dept for 10 am, per anesthesia.

## 2020-09-08 NOTE — PROGRESS NOTES
HOSPITALIST PROGRESS NOTE  Date: 9/8/2020   Name: Jose Francisco Felder   MRN: [id number]   YOB: 1960      Subjective/Interval Hx:   Patient denies chest pain difficulty breathing just returned from surgery, remains groggy under anesthesia    Objective:   Physical Exam:   BP (!) 167/80   Pulse 78   Temp 98.3 °F (36.8 °C) (Oral)   Resp 17   Ht 5' 3\" (1.6 m)   Wt 199 lb 12.8 oz (90.6 kg)   SpO2 95%   BMI 35.39 kg/m²   General: no acute distress, well nourished and well hydrated  HEENT: NCAT  Heart: S1S2 RRR  Lungs: Clear to ascultation bilaterally, respiratory effort normal  Abdomen: soft, NT/ND, positive bowel sounds  Extremities: no pitting edema, nontender   Neuro: patient is awake, alert and orientated times 3, no gross deficits  Skin: no rashes or ecchymosis        Meds:   Meds:    sodium chloride flush  10 mL Intravenous Once    levETIRAcetam  500 mg Oral BID    potassium bicarb-citric acid  20 mEq Oral Daily    magnesium oxide  400 mg Oral Daily    sodium chloride flush  10 mL Intravenous 2 times per day    atorvastatin  80 mg Oral Nightly    enalapril  10 mg Oral Daily    metoprolol succinate  50 mg Oral Daily      Infusions:   PRN Meds: promethazine, 25 mg, PRN  labetalol, 5 mg, Q10 Min PRN  meperidine, 12.5 mg, Q5 Min PRN  HYDROmorphone, 0.25 mg, Q5 Min PRN  HYDROmorphone, 0.5 mg, Q5 Min PRN  ioversol, 100 mL, ONCE PRN  benzocaine, , BID PRN  meclizine, 25 mg, 4x Daily PRN  acetaminophen, 1,000 mg, Q8H PRN  HYDROcodone 5 mg - acetaminophen, 1 tablet, Q6H PRN  sodium chloride flush, 10 mL, PRN  polyethylene glycol, 17 g, Daily PRN  ondansetron, 4 mg, Q6H PRN  hydrALAZINE, 5 mg, Q6H PRN        Data/Labs:     Recent Labs     09/06/20  0808 09/08/20  0443   WBC 13.0* 13.8*   HGB 10.5* 10.0*   HCT 32.7* 31.8*    340      Recent Labs     09/06/20  0808 09/08/20  0443    138   K 4.2 4.2    100   CO2 23 26   BUN 18 26*   CREATININE 0.5 0.6     No results for input(s): AST, ALT, ALB, BILIDIR, BILITOT, ALKPHOS in the last 72 hours. Recent Labs     09/08/20  0443   INR 1.2     No results for input(s): CKTOTAL, CKMB, CKMBINDEX, TROPONINT in the last 72 hours. I/O last 3 completed shifts: In: 800 [I.V.:800]  Out: 825 [Urine:800; Blood:25]    Intake/Output Summary (Last 24 hours) at 9/8/2020 1528  Last data filed at 9/8/2020 1314  Gross per 24 hour   Intake 800 ml   Output 825 ml   Net -25 ml        Assessment/Plan:   1. Acute non-STEMI with elevated troponins- serial troponins, EKG in a.m. denies chest pain at this time  2. Cerebral aneurysm status post coiling per neurosurgery-patient just returned from procedure still in the anesthesia denies chest pain or headaches we will continue to monitor  3. Hypertension-on enalapril, metoprolol, monitor blood pressure  4. Electrolyte imbalance due to hypokalemia- supplementation given daily orally monitor lab studies  5.  Seizure disorder-Keppra twice daily, seizure precaution    DVT Prophylaxis: scd  Diet: Diet NPO, After Midnight  Code Status: Full Code    Dispo: when stable     Electronically signed by Terrence Buenrostro MD on 9/8/2020 at 3:28 PM  [facility] Hospitalist

## 2020-09-08 NOTE — PROGRESS NOTES
Occupational Therapy     Date:2020  Patient Name: Ayesha Emmanuel  MRN: [id number]  : 1960  Room: [address] [place] [address]-A     Completed chart review & attempted to see pt with pt off the unit for surgery, aneurysm coiling today. Will attempt to see pt at another time. Valeria Ordoñez.  [address] [place] [address], [address] [place] [address]

## 2020-09-08 NOTE — ANESTHESIA PRE PROCEDURE
Department of Anesthesiology  Preprocedure Note       Name:  Zulay Spivey   Age:  56 y.o.  :  1960                                          MRN:  [id number]         Date:  2020      Surgeon: Dr. Pat Nichols    Procedure: Procedure(s):  [facility] [place] [facility]  ANESTHESIA - Cerebral Artery Aneurysm Coiling    Medications prior to admission:   Prior to Admission medications    Medication Sig Start Date End Date Taking? Authorizing Provider   ibuprofen (ADVIL;MOTRIN) 200 MG tablet Take 400 mg by mouth every 6 hours as needed for Pain    Historical Provider, MD   aspirin 81 MG tablet Take 81 mg by mouth daily    Historical Provider, MD   Multiple Vitamin (MULTIVITAMINS PO) Take by mouth    Historical Provider, MD   Calcium Citrate-Vitamin D (CALCIUM + D PO) Take by mouth    Historical Provider, MD   KRILL OIL PO Take by mouth    Historical Provider, MD   Omega-3 Fatty Acids (FISH OIL PO) Take by mouth    Historical Provider, MD   Ascorbic Acid (VITAMIN C) 500 MG tablet Take 500 mg by mouth daily    Historical Provider, MD   Coenzyme Q10 (CO Q 10 PO) Take by mouth    Historical Provider, MD   naproxen (NAPROSYN) 500 MG tablet Take 1 tablet by mouth 2 times daily for 7 days 17  AMARA Cesar   hydrochlorothiazide (HYDRODIURIL) 50 MG tablet Take 1 tablet by mouth 2 times daily 17  AMARA Cesar       Current medications:    No current facility-administered medications for this visit. No current outpatient medications on file.      Facility-Administered Medications Ordered in Other Visits   Medication Dose Route Frequency Provider Last Rate Last Dose    benzocaine (ORAJEL) 20 % mucosal gel   Mouth/Throat BID PRN Taisha Haider MD        meclizine (ANTIVERT) tablet 25 mg  25 mg Oral 4x Daily PRN REAGAN Tolbert CNP        acetaminophen (TYLENOL) tablet 1,000 mg  1,000 mg Oral Q8H PRN REAGAN Tolbert CNP   1,000 mg at 20 1933    sodium chloride flush 0.9 % injection 10 mL  10 mL Intravenous Once Ashia Andrews MD        levETIRAcetam (KEPPRA) tablet 500 mg  500 mg Oral BID Nena Riser, APRN - CNP   500 mg at 09/07/20 1947    HYDROcodone-acetaminophen (NORCO) 5-325 MG per tablet 1 tablet  1 tablet Oral Q6H PRN Nena Riser, APRN - CNP   1 tablet at 09/07/20 1946    potassium bicarb-citric acid (EFFER-K) effervescent tablet 20 mEq  20 mEq Oral Daily Nena Riser, APRN - CNP   20 mEq at 09/07/20 1224    magnesium oxide (MAG-OX) tablet 400 mg  400 mg Oral Daily Nena Riser, APRN - CNP   400 mg at 09/07/20 1058    sodium chloride flush 0.9 % injection 10 mL  10 mL Intravenous 2 times per day Nena Riser, APRN - CNP   10 mL at 09/08/20 0857    sodium chloride flush 0.9 % injection 10 mL  10 mL Intravenous PRN Nena Riser, APRN - CNP   10 mL at 09/01/20 1529    polyethylene glycol (GLYCOLAX) packet 17 g  17 g Oral Daily PRN Nena Riser, APRN - CNP        [facility] injection 4 mg  4 mg Intravenous Q6H PRN Nena Riser, APRN - CNP   4 mg at 08/29/20 0849    atorvastatin (LIPITOR) tablet 80 mg  80 mg Oral Nightly Nena Riser, APRN - CNP   80 mg at 09/07/20 1948    enalapril (VASOTEC) tablet 10 mg  10 mg Oral Daily Nena Riser, APRN - CNP   10 mg at 09/07/20 1058    metoprolol succinate (TOPROL XL) extended release tablet 50 mg  50 mg Oral Daily Nena Riser, APRN - CNP   50 mg at 09/07/20 1059    hydrALAZINE (APRESOLINE) injection 5 mg  5 mg Intravenous Q6H PRN Nena Riser, APRN - CNP   5 mg at 09/01/20 1529       Allergies:  No Known Allergies    Problem List:    Patient Active Problem List   Diagnosis Code    HTN (hypertension) I10    NSTEMI (non-ST elevated myocardial infarction) ([facility] [address] I21.4    Elevated troponin R79.89    Cerebellar stroke (HCC) I63.9    S/P craniotomy Z98.890    S/P resection of meningioma Z98.890, Z86.018    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       Past Medical History:        Diagnosis Date    Brain aneurysm     2009    Hypertension     Lumbar herniated disc        Past Surgical History:        Procedure Laterality Date    BRAIN SURGERY      CRANIOTOMY N/A 2020    CRANIOTOMY FRAMELESS STEREOTATIC FOR BRAIN TUMOR performed by Kaiden Larsen MD at [facility]         Social History:    Social History     Tobacco Use    Smoking status: Former Smoker     Types: Cigarettes     Last attempt to quit: 2009     Years since quittin.6   Substance Use Topics    Alcohol use: No                                Counseling given: Not Answered      Vital Signs (Current): There were no vitals filed for this visit. BP Readings from Last 3 Encounters:   20 (!) 146/67   20 119/76   17 (!) 156/100       NPO Status:  NPO after midnight                                                                               BMI:   Wt Readings from Last 3 Encounters:   20 199 lb 12.8 oz (90.6 kg)   17 219 lb (99.3 kg)   01/05/15 210 lb (95.3 kg)     There is no height or weight on file to calculate BMI.    CBC:   Lab Results   Component Value Date    WBC 13.8 2020    RBC 3.49 2020    HGB 10.0 2020    HCT 31.8 2020    MCV 91.1 2020    RDW 13.3 2020     2020       CMP:   Lab Results   Component Value Date     2020    K 4.2 2020    K 4.6 2020     2020    CO2 26 2020    BUN 26 2020    CREATININE 0.6 2020    GFRAA >60 2020    LABGLOM >60 2020    GLUCOSE 109 2020    GLUCOSE 94 05/10/2012    PROT 7.7 2020    CALCIUM 8.9 2020    BILITOT 1.1 2020    ALKPHOS 49 2020    AST 38 2020    ALT 27 2020       POC Tests: No results for input(s): POCGLU, POCNA, POCK, POCCL, POCBUN, POCHEMO, POCHCT in the last 72 hours.     Coags:   Lab Results   Component Value Date    PROTIME 13.2 09/08/2020    INR 1.2 09/08/2020    APTT 29.6 08/23/2020       HCG (If Applicable): No results found for: PREGTESTUR, PREGSERUM, HCG, HCGQUANT     ABGs:   Lab Results   Component Value Date    PO2ART 453.4 08/28/2020    ROW4YBF 45.7 08/28/2020    VJL7XDS 23.0 08/28/2020        Type & Screen (If Applicable):  No results found for: LABABO, LABRH    Drug/Infectious Status (If Applicable):  No results found for: HIV, HEPCAB    COVID-19 Screening (If Applicable):   Lab Results   Component Value Date    COVID19 Not Detected 08/31/2020       CT HEAD 8/26/2020  FINDINGS: The left occipital lesion enhances intensely no homogeneous    enhancement compatible with a meningioma causing was origin edema of    the underlying brain parenchyma compressing the region of the left    lateral ventricle.         No other enhancing extra-axial lesion is seen.         Old infarct with encephalomalacia in the right cerebral hemisphere as    discussed previously.         Images were obtained with Bravo protocol for preoperatory evaluation.         There is a aneurysm of the tip of the basilar artery measuring    approximately 5.4 mm are.              Impression    1. CT scan of the brain bravo thickening for were no indication.         2. Large meningioma for the left occipital region as described on    previous study.         3. 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       CXR 8/23/2020  Findings:         The lungs are clear.  There is no evidence of pulmonary infiltrate or    pleural effusion.  The pulmonary vascularity is unremarkable.  The    cardiac, hilar and mediastinal silhouettes are satisfactory.  The bony    thorax demonstrates no gross abnormality.                   Impression         NO ACUTE CARDIOPULMONARY PROCESS      EKG 8/24/2020  Component  Value  Ref Range & Units  Status  Collected  Lab    Ventricular Rate  63  BPM  Final  08/24/2020  8:10 AM  [facility]    Atrial Rate  63 BPM  Final  08/24/2020  8:10 AM  [facility]    P-R Interval  200  ms  Final  08/24/2020  8:10 AM  [facility]    QRS Duration  70  ms  Final  08/24/2020  8:10 AM  [facility]    Q-T Interval  442  ms  Final  08/24/2020  8:10 AM  [facility]    QTc Calculation (Bazett)  452  ms  Final  08/24/2020  8:10 AM  [facility]    P Axis  40  degrees  Final  08/24/2020  8:10 AM  [facility]    T Axis  21  degrees  Final  08/24/2020  8:10 AM  [facility]    Testing Performed By     Lab - Abbreviation  Name  Director  Address  Valid Date Range    360-[facility]  [facility] MUSE  Unknown  Unknown  04/18/16 0721-Present    Narrative & Impression     Normal sinus rhythm  Anteroseptal infarct (cited on or before 23-AUG-2020)  Abnormal ECG  When compared with ECG of 23-AUG-2020 12:00,  No significant change was found     ECHO 8/26/2020   Findings      Left Ventricle   Left ventricle hypertrophy. Normal left ventricular systolic function. Visually estimated LVEF is 60-65 %. No wall motion abnormalities. Right Ventricle   Normal right ventricle structure and function. Left Atrium   Normal left atrial size. Right Atrium   Normal right atrium size. Mitral Valve   Mitral annular calcification. No mitral valve regurgitation. Tricuspid Valve   Normal tricuspid valve structure and function. Trace tricuspid valve regurgitation. Aortic Valve   Aortic valve sclerosis. Mild aortic regurgitation. Pulmonic Valve   Normal pulmonic valve structure and function. Trace pulmonic valve regurgitation. Pericardial Effusion   No pericardial effusion. Conclusions      Summary   Left ventricle hypertrophy. Normal left ventricular systolic function. Visually estimated LVEF is 60-65 %. No wall motion abnormalities. Normal right ventricle structure and function. No significant valvular abnormalities. No comparison study available.       Anesthesia Evaluation  Patient summary reviewed and Nursing notes reviewed no history of anesthetic complications:   Airway: Mallampati: II  TM distance: >3 FB   Neck ROM: full  Mouth opening: > = 3 FB Dental:      Comment: Poor dentition    Pulmonary:Negative Pulmonary ROS breath sounds clear to auscultation      (-) not a current smoker                           Cardiovascular:    (+) hypertension:, past MI:, hyperlipidemia      ECG reviewed  Rhythm: regular  Rate: normal  Echocardiogram reviewed         Beta Blocker:  Dose within 24 Hrs      ROS comment: EF 60-65%  Mild AS     Neuro/Psych:   (+) depression/anxiety              ROS comment: IMPRESSION:  1.  A large left occipital lobe tumor, most probably meningioma with  mass effect. 2.  Status post craniotomy for the clipping of the aneurysm several  years ago. 3.  Psychosis and possible suicidal ideation.    GI/Hepatic/Renal:   (+) GERD:,           Endo/Other: Negative Endo/Other ROS                    Abdominal:   (+) obese,         Vascular:   + PVD, aortic or cerebral, . ROS comment: Brain aneurysm. Patient MRN:  [id number]    : 1960    Age: 56 years    Gender: Female        Order Date:  2020 8:29 PM                TECHNIQUE/NUMBER OF IMAGES/COMPARISON/CLINICAL HISTORY:        CT brain        Axial images were obtained with sagittal and coronal reconstructions. The        117 images. Comparison 2010. The        History altered mental status. FINDINGS: As observed on previous examination there is an area for old    encephalomalacia in the right temporal lobe extending to temporal    parieto-occipital regions and also into the complex of the right    frontal lobe. These are old findings are. The        There is a large size extra-axial mass along the left side of the falx    in the occipital region. This mass measures 4.8 x 6.1 x 5.1 cm.  The it    causes significant compression of the adjacent brain parenchyma of the    left occipital lobe, impresses the tentorium of the cerebellum    inferiorly and also is shifting the posterior interhemispheric fissure    to the left side. It causes also perilesional edema of the adjacent    the brain parenchyma of the left occipital lobe. On the previous study of March 10 this lesion measured up to 4 x 3.6    cm. These indicates significant increased size of this lesion since    the previous examination. There is no indication for new acute or lesion to the brain    parenchyma. There is no indication for intracranial hemorrhagic event. In the left side of the scalp there is a larger 4 x 3.6 cm partial    calcified trichilemmal cyst which had significantly increased since    the previous study of 2010, and also on the left side of the    soft tissues of the posterior-inferior occipital region there is a    second trichilemmal cyst of smaller size measuring 15 x 13 mm. Impression    Significant progression in the size of the posterior left    para sagittal meningioma which presently causes significant mass    effect in the left occipital lobe, depressing the left-sided tentorium    inferiorly and causing vasogenic edema in the left occipital lobe    parenchyma. ALERT:  ABNORMAL REPORT. Patient MRN:  [id number]    : 1960    Age: 56 years    Gender: Female        Order Date:  2020 4:35 PM                TECHNIQUE/NUMBER OF IMAGES/COMPARISON/CLINICAL HISTORY:        CT brain        Axial images were obtained with sagittal and coronal reconstructions    done without and with IV contrast with probable protocol. 69-year-old female patient with a large meningeal-based lesion in the    left occipital region cause significant mass effect.             FINDINGS: The left occipital lesion enhances intensely no homogeneous    enhancement compatible with a meningioma causing was origin edema of    the underlying brain parenchyma compressing the region of the left    lateral ventricle. No other enhancing extra-axial lesion is seen. Old infarct with encephalomalacia in the right cerebral hemisphere as    discussed previously. Images were obtained with Bravo protocol for preoperatory evaluation. There is a aneurysm of the tip of the basilar artery measuring    approximately 5.4 mm are. Impression    1. CT scan of the brain bravo thickening for were no indication. 2. Large meningioma for the left occipital region as described on    previous study. 3. 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 Order History     Open Order Details    PACS Images      Show images for CT HEAD W WO CONTRAST            Anesthesia Plan      general     ASA 3       Induction: intravenous. arterial line  MIPS: Postoperative opioids intended, Prophylactic antiemetics administered and Postoperative trial extubation. Anesthetic plan and risks discussed with patient. Use of blood products discussed with patient whom consented to blood products. Plan discussed with CRNA.                   Romelia Hanks MD   9/8/2020

## 2020-09-09 LAB
ALBUMIN SERPL-MCNC: 3.3 G/DL (ref 3.5–5.2)
ALP BLD-CCNC: 69 U/L (ref 35–104)
ALT SERPL-CCNC: 23 U/L (ref 0–32)
ANION GAP SERPL CALCULATED.3IONS-SCNC: 10 MMOL/L (ref 7–16)
AST SERPL-CCNC: 13 U/L (ref 0–31)
BILIRUB SERPL-MCNC: 0.6 MG/DL (ref 0–1.2)
BUN BLDV-MCNC: 18 MG/DL (ref 8–23)
CALCIUM SERPL-MCNC: 9.1 MG/DL (ref 8.6–10.2)
CHLORIDE BLD-SCNC: 101 MMOL/L (ref 98–107)
CO2: 28 MMOL/L (ref 22–29)
CREAT SERPL-MCNC: 0.6 MG/DL (ref 0.5–1)
GFR AFRICAN AMERICAN: >60
GFR NON-AFRICAN AMERICAN: >60 ML/MIN/1.73
GLUCOSE BLD-MCNC: 107 MG/DL (ref 74–99)
HCT VFR BLD CALC: 31.8 % (ref 34–48)
HEMOGLOBIN: 10.1 G/DL (ref 11.5–15.5)
MAGNESIUM: 2.2 MG/DL (ref 1.6–2.6)
MCH RBC QN AUTO: 28.5 PG (ref 26–35)
MCHC RBC AUTO-ENTMCNC: 31.8 % (ref 32–34.5)
MCV RBC AUTO: 89.8 FL (ref 80–99.9)
PDW BLD-RTO: 13.1 FL (ref 11.5–15)
PHOSPHORUS: 4.4 MG/DL (ref 2.5–4.5)
PLATELET # BLD: 418 E9/L (ref 130–450)
PMV BLD AUTO: 10.9 FL (ref 7–12)
POTASSIUM SERPL-SCNC: 4 MMOL/L (ref 3.5–5)
RBC # BLD: 3.54 E12/L (ref 3.5–5.5)
SODIUM BLD-SCNC: 139 MMOL/L (ref 132–146)
TOTAL PROTEIN: 7.1 G/DL (ref 6.4–8.3)
WBC # BLD: 16.4 E9/L (ref 4.5–11.5)

## 2020-09-09 PROCEDURE — 36415 COLL VENOUS BLD VENIPUNCTURE: CPT

## 2020-09-09 PROCEDURE — 6370000000 HC RX 637 (ALT 250 FOR IP): Performed by: NURSE PRACTITIONER

## 2020-09-09 PROCEDURE — 2580000003 HC RX 258: Performed by: NURSE PRACTITIONER

## 2020-09-09 PROCEDURE — 2700000000 HC OXYGEN THERAPY PER DAY

## 2020-09-09 PROCEDURE — 97164 PT RE-EVAL EST PLAN CARE: CPT

## 2020-09-09 PROCEDURE — 80053 COMPREHEN METABOLIC PANEL: CPT

## 2020-09-09 PROCEDURE — 84100 ASSAY OF PHOSPHORUS: CPT

## 2020-09-09 PROCEDURE — 97168 OT RE-EVAL EST PLAN CARE: CPT

## 2020-09-09 PROCEDURE — 2000000000 HC ICU R&B

## 2020-09-09 PROCEDURE — 97530 THERAPEUTIC ACTIVITIES: CPT

## 2020-09-09 PROCEDURE — 83735 ASSAY OF MAGNESIUM: CPT

## 2020-09-09 PROCEDURE — 99291 CRITICAL CARE FIRST HOUR: CPT | Performed by: SURGERY

## 2020-09-09 PROCEDURE — 97535 SELF CARE MNGMENT TRAINING: CPT

## 2020-09-09 PROCEDURE — 85027 COMPLETE CBC AUTOMATED: CPT

## 2020-09-09 RX ADMIN — ENALAPRIL MALEATE 10 MG: 10 TABLET ORAL at 08:25

## 2020-09-09 RX ADMIN — LEVETIRACETAM 500 MG: 500 TABLET ORAL at 08:25

## 2020-09-09 RX ADMIN — Medication 10 ML: at 08:26

## 2020-09-09 RX ADMIN — ACETAMINOPHEN 1000 MG: 500 TABLET ORAL at 10:38

## 2020-09-09 RX ADMIN — LEVETIRACETAM 500 MG: 500 TABLET ORAL at 20:38

## 2020-09-09 RX ADMIN — ATORVASTATIN CALCIUM 80 MG: 80 TABLET, FILM COATED ORAL at 20:38

## 2020-09-09 RX ADMIN — POTASSIUM BICARBONATE 20 MEQ: 782 TABLET, EFFERVESCENT ORAL at 08:25

## 2020-09-09 RX ADMIN — Medication 10 ML: at 20:28

## 2020-09-09 RX ADMIN — MAGNESIUM GLUCONATE 500 MG ORAL TABLET 400 MG: 500 TABLET ORAL at 08:24

## 2020-09-09 RX ADMIN — METOPROLOL SUCCINATE 50 MG: 50 TABLET, EXTENDED RELEASE ORAL at 09:07

## 2020-09-09 ASSESSMENT — PAIN SCALES - GENERAL
PAINLEVEL_OUTOF10: 0
PAINLEVEL_OUTOF10: 2
PAINLEVEL_OUTOF10: 0
PAINLEVEL_OUTOF10: 0
PAINLEVEL_OUTOF10: 4
PAINLEVEL_OUTOF10: 0
PAINLEVEL_OUTOF10: 2
PAINLEVEL_OUTOF10: 0
PAINLEVEL_OUTOF10: 0
PAINLEVEL_OUTOF10: 2
PAINLEVEL_OUTOF10: 0
PAINLEVEL_OUTOF10: 0

## 2020-09-09 ASSESSMENT — PAIN DESCRIPTION - PAIN TYPE
TYPE: SURGICAL PAIN

## 2020-09-09 ASSESSMENT — PAIN DESCRIPTION - LOCATION: LOCATION: HEAD

## 2020-09-09 ASSESSMENT — PAIN DESCRIPTION - DESCRIPTORS: DESCRIPTORS: DISCOMFORT;HEADACHE

## 2020-09-09 NOTE — CARE COORDINATION
Transferred to [facility] post aneurysm coiling. Met with pt and brother in room. Discussed previous plan for [facility]. Pt does not want [facility] as her 1st choice d/t location. She prefers Acute rehab, but is aware that there may not be a bed available soon. [facility] not in network. Gave her and brother Izabel Flooddo and [facility] of contacted snf's. They will have choices tomorrow after talking with pt's . Pt is agreeable to keep [facility] as back up if other choices cannot accept.

## 2020-09-09 NOTE — PROGRESS NOTES
Physical Therapy  Physical Therapy Re-Assessment     Name: Mekhi Mayer  : 1960  MRN: [id number]    Referring Provider:  REAGAN Jasmine CNP    Date of Service: 2020    Evaluating PT:  Oscar Dolores, PT, DPT [id number]    Room #:  3676/7169-P  Diagnosis:  NSTEMI  Precautions: Falls, Poor vision - R worse than L, bed alarm  Procedure/Surgery:   Frameless stereotactic left parietooccipital craniotomy for the excision of large meningioma; Excision of large sebaceous cyst, left occipital scalp,  IR for cerebral coiling   PMHx/PSHx:  Brain aneurysm, HTN  Equipment Needs:  TBD    SUBJECTIVE:    Pt lives alone in an apartment with 5 stairs to enter and no rail. Pt ambulated with walking stick and was independent PTA. Pt's  brings meals. OBJECTIVE:   Re-Evaluation  Date: 20 Treatment Short Term/ Long Term   Goals   AM-PAC 6 Clicks 33/49     Was pt agreeable to Eval/treatment? Yes     Does pt have pain?  7/10 HA     Bed Mobility  Rolling: NT  Supine to sit: Juan with HOB  Sit to supine: NT  Scooting: Juan  SBA   Transfers Sit to stand: Juan  Stand to sit: Juan  Stand pivot: ModA with [employer]  SBA with AAD   Ambulation   20 feet with ModA with [employer]  >100 feet with SBA with AAD   Stair negotiation: ascended and descended NT  >4 steps with 1 rail Juan   ROM BUE:  Defer to OT note  BLE:  WNL     Strength BUE:  Defer to OT note  BLE:  4/5  Increase by 1/3 MMT grade   Balance Sitting EOB:  Juan  Dynamic Standing:  ModA with [employer]  Sitting EOB:  Independent  Dynamic Standing:  SBA with AAD     Pt is A & O x 4  CAM-ICU: NT  RASS: 0  Sensation:  WNL  Edema:  None    Vitals:  Heart Rate at rest 64 bpm Heart Rate post session 69 bpm   SpO2 at rest 96% SpO2 post session 96%   Blood Pressure at rest 125/75 mmHg Blood Pressure post session 113/86 mmHg     Functional Status Score-Intensive Care Unit (FSS-ICU)   Rolling    Supine to sit transfer    Unsupported sitting     Sit to stand transfers  Ambulation 1/7   Total  13/35       Therapeutic Exercises:  NA    Patient education  Pt educated on safety    Patient response to education:   Pt verbalized understanding Pt demonstrated skill Pt requires further education in this area   x x x     ASSESSMENT:    Comments:  RN reported pt was stable for session. Pt was in bed upon arrival, agreeable to re-evaluation s/p coiling and transfer to ICU. Pt was limited by vision and complaints of dizziness with upright activity. BP was assessed throughout session and remained WNL. Pt stood with flexed posture and poor hand grasp on [address].  B knee/LE pain reported in standing but pt completed shuffled steps to chair. After a seated rest break, pt stood from chair and completed ambulation in room. Poor [address] management observed requiring assistance. Pt reported dizziness and was returned to chair. All needs met and call light in reach. Pt's brother was present for session. Pt would still benefit from an intensive rehabilitation program at discharge. Discussed with SANTIAGO Turcios. Treatment:  Patient practiced and was instructed in the following treatment:     Bed mobility training - pt given verbal and tactile cues to facilitate proper sequencing and safety during supine>sit as well as provided with physical assistance to complete task    Sitting EOB for >8 minutes for upright tolerance, postural awareness and BLE ROM   Transfer training - pt was given verbal and tactile cues to facilitate proper hand placement, technique and safety during sit to stand and stand to sit as well as provided with physical assistance to complete task.  Gait training- pt was given verbal and tactile cues to facilitate safety, balance and WW management during ambulation as well as provided with physical assistance to complete task. Pt's/ family goals   1. Return to PLOF    Patient and or family understand(s) diagnosis, prognosis, and plan of care.   Yes    PLAN:    PT care will be provided in accordance with the objectives noted above. Exercises and functional mobility practice will be used as well as appropriate assistive devices or modalities to obtain goals. Patient and family education will also be administered as needed. Frequency of treatments: 2-5x/week x 1-2 weeks. Time in  0930  Time out  1005    Total Treatment Time  30 minutes     Evaluation Time includes thorough review of current medical information, gathering information on past medical history/social history and prior level of function, completion of standardized testing/informal observation of tasks, assessment of data and education on plan of care and goals.     CPT codes:  [] Low Complexity PT evaluation 64126  [] Moderate Complexity PT evaluation 53629  [] High Complexity PT evaluation 76056  [x] PT Re-evaluation 40342  [] Gait training 27743 - minutes  [] Manual therapy 18655 - minutes  [x] Therapeutic activities 99220 30 minutes  [] Therapeutic exercises 48799 - minutes  [] Neuromuscular reeducation 64348 - minutes     Lulu Penny, PT, DPT  [id number]

## 2020-09-09 NOTE — PROGRESS NOTES
Went and visited patient with Dr Candi Oliveira at bedside. Patient stable, doing well. No s/s of complications noted or reported. Complains of mild pain on top of head. Right groin access site soft, some tenderness noted. Any questions call IR at x [phone].

## 2020-09-09 NOTE — PROGRESS NOTES
recall)              Sequencing: Germaine Keepers-              Problem solving:  P+              Judgement/safety:  Poor+      Impulsive and impaired insight into deficits                Functional Assessment:    Initial Eval Status  Date: 9/9/20 Treatment Status  Date:   STG/LTG  Frequency/duration  2-3x/week, 5-7 days   Feeding Minimal Assist   Impaired vision     Set-up    Grooming Moderate assist    (standing)    Supervision    UB Dressing Moderate Assist    Donning robe ; assist with threading UE and around back; visual deficits   Supervision    LB Dressing Maximal Assist    Figure 4 technique  Minimal Assist    Bathing Maximal Assist  Simulated  Minimal Assist    Toileting Maximal Assist     Minimal Assist    Bed Mobility  Supine to sit: Moderate Assist   Sit to supine: Moderate Assist   Supine to sit: Minimal Assist   Sit to supine: Minimal Assist    Functional Transfers Sit to stand: Minimal Assist   Stand pivot: Moderate Assist     Stand by Assist   Functional Mobility  (Ambulation) Moderate Assist    Ambulation tasks with w/w in preparation for ambulation to/from bathroom   Stand by Assist    Balance Sitting:     Static:  SBA    Dynamic:min A  Standing: mod A      Activity Tolerance F-    Light activity ; limited due to intermittent dizziness; resolved with time  FF+   Visual/  Perceptual Glasses: yes  Impaired vision; poor peripheral vision.     Continue to assess              Vitals:  Blood Pressure at rest 125/75 Blood Pressure post session 113/86   Heart Rate at rest 64 Heart Rate post session 70   SPO2 at rest 96% SPO2 post session 97%       Hand dominance: right      Strength ROM Additional Info:    RUE   3+/5 wfl fair  and fair FMC/dexterity noted during ADL tasks     LUE 3+/5 wfl fair  and fair FMC/dexterity noted during ADL tasks     Hearing: wfl  Sensation: reports chronic B LE numbness and tingling   Tone: wfl  Edema:none noted                             Comments: Upon arrival, patient supine in bed and agreeable to OT session - nursing clearance obtained prior to session. Pt demonstrating F- understanding of education/techniques, requiring additional training / education. At end of session, patient seated in chair with call light and phone within reach, all lines tubes intact. Pt would benefit from continued skilled OT to increase safety,  independence and quality of life. Treatment:  OT services provided: Facilitation of bed mobility, unsupported sitting balance, functional transfers (various surfaces: bed, chair; sit to stands / stand pivots), standing tolerance tasks (addressing posture, balance and activity tolerance at w/w ) and short functional ambulation tasks with w/w in room (cuing on visual scanning, posture, w/w management / approximation and safety; completed in preparation for ambulation to/from bathroom) - skilled cuing on sequencing, visual scanning, hand placement, posture, body mechanics, energy conservation techniques and safety. Therapist facilitated self-care retraining: UB/LB self-care tasks (robe, socks; figure 4 technique), simulated toileting hygiene task, seated / standing grooming tasks and self-feeding tasks (cuing on visual scanning or plate, location of times and safety) while educating pt on visual scanning, modified techniques, posture, safety. Skilled monitoring of HR, O2 sats, BP and pts response to treatment.        Assessment of current deficits   Functional mobility [x]  ADLs [x] Strength [x]  Cognition []  Functional transfers  [x] IADLs [x] Safety Awareness [x]  Endurance [x]  Fine Motor Coordination [] Balance [x] Vision/perception [x] Sensation [x]   Gross Motor Coordination [] ROM [] Delirium []                  Motor Control []    Plan of Care:  OT 1-3x/week for 5-7 days PRN   [x] ADL retraining/AE recommendations (modified techniques)  [x] Energy Conservation Techniques/Strategies      [x] Neuromuscular Re-Education      [x] Functional Transfer Training [x] Functional Mobility Training          [] Cognitive Re-Training          [] Splinting/Positioning Needs           [x] Therapeutic Activity   [x]Therapeutic Exercise   [x] Visual/Perceptual (poor peripheral vision; visual scanning techniques, etc)  [] Delirium Prevention/Treatment   [] Positioning to Improve Functional [place], Safety, and Skin Integrity   [x] Patient and/or Family Education to Increase Safety and Functional Dulce   [] Other:     Rehab Potential: Good for established goals    LTG: maximize independence with ADLs   Patient / Family Goal:  Not stated     Patient and/or family were instructed diagnosis, prognosis/goals and plan of care. Demonstrated poor understanding. [] Malnutrition indicators have been identified and nursing has been notified to ensure a dietitian consult is ordered.        AM-PAC Daily Activity Inpatient   How much help for putting on and taking off regular lower body clothing?: A Lot  How much help for Bathing?: A Lot  How much help for Toileting?: A Lot  How much help for putting on and taking off regular upper body clothing?: A Lot  How much help for taking care of personal grooming?: A Lot  How much help for eating meals?: A Little  AM-[facility] Inpatient Daily Activity Raw Score: 13  AM-PAC Inpatient ADL T-Scale Score : 32.03  ADL Inpatient CMS 0-100% Score: 63.03  ADL Inpatient CMS G-Code Modifier : CL       Re- Evaluation    (Re- Evaluation time includes thorough review of current medical information, gathering information on past medical history/social history and prior level of function, completion of standardized testing/informal observation of tasks, assessment of data, and development of POC/Goals.)    Time In: 1005  Time Out: 1035  Total Treatment Time: 24     Min Units   OT Eval Low 92530     OT Eval Medium 58173     OT Eval High 37980     OT Re-Eval 97168 x 1   Therapeutic Ex 47338     Therapeutic Activities 10394 10 1   ADL/Self Care 60227 14 1

## 2020-09-09 NOTE — PLAN OF CARE
Problem: Falls - Risk of:  Goal: Will remain free from falls  Description: Will remain free from falls  9/9/2020 0931 by Brandon Liu RN  Outcome: Met This Shift     Problem: Skin Integrity:  Goal: Will show no infection signs and symptoms  Description: Will show no infection signs and symptoms  9/9/2020 0931 by Brandon Liu RN  Outcome: Met This Shift     Problem: Pain:  Goal: Pain level will decrease  Description: Pain level will decrease  9/9/2020 0931 by Brnadon Liu RN  Outcome: Met This Shift     Problem: Fluid Volume - Imbalance:  Goal: Absence of imbalanced fluid volume signs and symptoms  Description: Absence of imbalanced fluid volume signs and symptoms  Outcome: Met This Shift     Problem: Cardiac Output - Decreased:  Goal: Hemodynamic stability will improve  Description: Hemodynamic stability will improve  9/9/2020 0931 by Brandon Liu RN  Outcome: Met This Shift     Problem: Mental Status - Impaired:  Goal: Mental status will be restored to baseline  Description: Mental status will be restored to baseline  Outcome: Met This Shift     Problem: Nutrition Deficit:  Goal: Ability to achieve adequate nutritional intake will improve  Description: Ability to achieve adequate nutritional intake will improve  Outcome: Met This Shift     Problem: Pain:  Goal: Recognizes and communicates pain  Description: Recognizes and communicates pain  Outcome: Met This Shift     Problem: Skin Integrity - Impaired:  Goal: Will show no infection signs and symptoms  Description: Will show no infection signs and symptoms  Outcome: Met This Shift

## 2020-09-09 NOTE — PROGRESS NOTES
HOSPITALIST PROGRESS NOTE  Date: 9/9/2020   Name: Francisca Nyhan   MRN: [id number]   YOB: 1960      Subjective/Interval Hx:   Patient is sitting up in chair talking with her partner, denies any pain or distress at this time    Objective:   Physical Exam:   /72   Pulse 70   Temp 97.7 °F (36.5 °C) (Oral)   Resp 18   Ht 5' 3\" (1.6 m)   Wt 194 lb 0.1 oz (88 kg)   SpO2 94%   BMI 34.37 kg/m²   General: no acute distress, well nourished and well hydrated  HEENT: NCAT  Heart: S1S2 RRR  Lungs: Clear to ascultation bilaterally, respiratory effort normal  Abdomen: soft, NT/ND, positive bowel sounds  Extremities: no pitting edema, nontender   Neuro: patient is awake, alert and orientated times 3, no gross deficits  Skin: no rashes or ecchymosis        Meds:   Meds:    sodium chloride flush  10 mL Intravenous Once    levETIRAcetam  500 mg Oral BID    potassium bicarb-citric acid  20 mEq Oral Daily    magnesium oxide  400 mg Oral Daily    sodium chloride flush  10 mL Intravenous 2 times per day    atorvastatin  80 mg Oral Nightly    enalapril  10 mg Oral Daily    metoprolol succinate  50 mg Oral Daily      Infusions:   PRN Meds: labetalol, 5 mg, Q10 Min PRN  benzocaine, , BID PRN  meclizine, 25 mg, 4x Daily PRN  acetaminophen, 1,000 mg, Q8H PRN  HYDROcodone 5 mg - acetaminophen, 1 tablet, Q6H PRN  sodium chloride flush, 10 mL, PRN  polyethylene glycol, 17 g, Daily PRN  ondansetron, 4 mg, Q6H PRN        Data/Labs:     Recent Labs     09/08/20  0443 09/09/20  0740   WBC 13.8* 16.4*   HGB 10.0* 10.1*   HCT 31.8* 31.8*    418      Recent Labs     09/08/20  0443 09/09/20  0740    139   K 4.2 4.0    101   CO2 26 28   PHOS  --  4.4   BUN 26* 18   CREATININE 0.6 0.6     Recent Labs     09/09/20  0740   AST 13   ALT 23   BILITOT 0.6   ALKPHOS 69     Recent Labs     09/08/20  0443   INR 1.2     No results for input(s): CKTOTAL, CKMB, CKMBINDEX, TROPONINT in the last 72 hours.    I/O last 3 completed shifts: In: 800 [I.V.:800]  Out: 925 [Urine:900; Blood:25]    Intake/Output Summary (Last 24 hours) at 9/9/2020 1304  Last data filed at 9/9/2020 1000  Gross per 24 hour   Intake 1040 ml   Output 1225 ml   Net -185 ml        Assessment/Plan:   1. Acute non-STEMI with elevated troponins- serial troponins, EKG in a.m. denies chest pain at this time  2. Cerebral aneurysm status post coiling per neurosurgery-patient just returned from procedure still in the anesthesia denies chest pain or headaches we will continue to monitor  09/09/2020- postop day per neurosurgery with micro coil of aneurysm, patient is doing well denies any pain or distress no headaches  3. Hypertension-on enalapril, metoprolol, monitor blood pressure  4. Electrolyte imbalance due to hypokalemia- supplementation given daily orally monitor lab studies  5.  Seizure disorder-Keppra twice daily, seizure precaution    DVT Prophylaxis: scd  Diet: DIET GENERAL; No Caffeine  Code Status: Full Code    Dispo: when stable     Electronically signed by George Hines MD on 9/9/2020 at 1:04 PM  [facility] Hospitalist

## 2020-09-09 NOTE — ANESTHESIA POSTPROCEDURE EVALUATION
Department of Anesthesiology  Postprocedure Note    Patient: Maureen Edge  MRN: [id number]  YOB: 1960  Date of evaluation: 9/9/2020  Time:  10:16 AM     Procedure Summary     Date:  09/08/20 Room / Location:  [address] [place] [address] Procedures; [facility] [address] [place] [address] Radiology    Anesthesia Start:  1055 Anesthesia Stop:  80    Procedures:       IR JAMIE VERTEBRAL ART RT W IMAGING      IR OCCLUSIVE OR EMBOL PERC CENTL NERV SYS      IR TRANSCATH EMBOLIZATION      IR ANGIO THROUGH CATHETER FOLLOW UP TRANSCATHETER STUDY      IR JAMIE VERTEBRAL ART LT W IMAGING      IR JAMIE INT CAROTID LT INTRCRANIAL Diagnosis:       (aneurysm coiling)      (aneurysm coiling)      (aneurysm coiling)      (aneurysm coiling)      (aneurysm coiling)      (aneurysm coiling)    Scheduled Providers:  [facility] Radiologist Responsible Provider:  Gary Avery MD    Anesthesia Type:  general ASA Status:  3          Anesthesia Type: general    Kristopher Phase I: Kristopher Score: 8    Kristopher Phase II:      Last vitals: Reviewed and per EMR flowsheets.        Anesthesia Post Evaluation    Patient location during evaluation: PACU  Patient participation: complete - patient participated  Level of consciousness: awake  Airway patency: patent  Nausea & Vomiting: no nausea and no vomiting  Complications: no  Cardiovascular status: hemodynamically stable  Respiratory status: acceptable  Hydration status: stable

## 2020-09-09 NOTE — FLOWSHEET NOTE
Left brachial art line removed. Could not get waveform despite repositioning it, would not draw back. Site wasn't bleeding post removal. Pressure dressing still applied.

## 2020-09-09 NOTE — PROGRESS NOTES
[facility] Intensive Care Unit  Critical Care Consult  Daily Progress Note 9/9/2020    Date of Admission: 8/23    EVENTS:     8/23/20 Admitted with psychosis. Troponin levels were elevated. Hypertensive  8/26/20 Cardiology determined elevated troponin levels rt  Demand ischemia with HTN.    8/28/20 Underwent craniotomy parietoccipital for removal of meningioma, occipital for removal of cyst.   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 Pain Description: none    GCS:    4 - Opens eyes on own   6 - Follows simple motor commands  5 - Alert and oriented    Pupil size:  Left 3 mm  Right 3 mm  Pupil reaction: Yes  Wiggles fingers: Left Yes Right Yes  Hand grasp:   Left normal     Right normal  Wiggles toes: Left Yes    Right Yes  Plantar flexion: Left normal    Right normal    CONSTITUTIONAL: no acute distress, lying in hospital bed,   NEUROLOGIC: PERRL, oriented x 4, states no improvement in vision  CARDIOVASCULAR: S1 S2, regular rate, regular rhythm, no murmur/gallop/rub. Monitor: sinus kehinde  PULMONARY: no rhonchi/rales/wheezes, no use of accessory muscles, RA  RENAL: villa to gravity, clear yellow urine  ABDOMEN: soft, nontender, nondistended, nontympanic, normal bowel sounds   MUSCULOSKELETAL: moves all extremities purposefully, 5/5 strength   SKIN/EXTREMITIES: no rashes/ecchymosis, no edema/clubbing, warm/dry, good capillary refill    LINES: Peripheral     ASSESSMENT/PLAN:       · Neuro:  Aneurysm coiling, recent meningioma removal, recent R MCA Hx aneurysm clipping.   Monitor neuro status, Neurosurgery following, Neurology signed off,  Lauri Myles for seizure prophylaxis. Antiplatelet therapy when OK with neurosurgery   · CV: No acute issues, recent elevated trop secondary to demand ischemia - cardiology singed off HX of HTN. Monitor hemodynamics. BP goal < 160. PRN  labetalol. Statin. Metoprolol, Vasotec  · Pulm: No acute issues. Monitor RR & SpO2. Pulmonary hygiene  · GI: No acute issues. Diet. Monitor bowel function. · Renal: No acute issues. Monitor BUN & Cr. Monitor electrolytes & replace as needed. Monitor urine output. · ID: No acute issues   · Endocrine: No acute issues. Monitor BS. · MSK: No acute issues. PT/OT  · Heme: No acute issues. Monitor CBC. Bowel regime: Glycolax  Pain control/Sedation: Norco,  Tylenol  DVT prophylaxis: SCDs. No Lovenox/heparin until ok with neurosurgery.    GI prophylaxis: Diet  Mouth/Eye care: as needed  Julien: will remove   Family update: Questions answered for patient   Code status:  Full  Disposition:  ICU    Electronically signed by REAGAN Escalante CNP on 9/9/2020 at 9:44 AM

## 2020-09-10 LAB
ALBUMIN SERPL-MCNC: 3 G/DL (ref 3.5–5.2)
ALP BLD-CCNC: 61 U/L (ref 35–104)
ALT SERPL-CCNC: 20 U/L (ref 0–32)
ANION GAP SERPL CALCULATED.3IONS-SCNC: 12 MMOL/L (ref 7–16)
AST SERPL-CCNC: 14 U/L (ref 0–31)
BILIRUB SERPL-MCNC: 0.4 MG/DL (ref 0–1.2)
BUN BLDV-MCNC: 21 MG/DL (ref 8–23)
CALCIUM SERPL-MCNC: 8.6 MG/DL (ref 8.6–10.2)
CHLORIDE BLD-SCNC: 102 MMOL/L (ref 98–107)
CO2: 24 MMOL/L (ref 22–29)
CREAT SERPL-MCNC: 0.5 MG/DL (ref 0.5–1)
GFR AFRICAN AMERICAN: >60
GFR NON-AFRICAN AMERICAN: >60 ML/MIN/1.73
GLUCOSE BLD-MCNC: 89 MG/DL (ref 74–99)
HCT VFR BLD CALC: 30.5 % (ref 34–48)
HEMOGLOBIN: 9.9 G/DL (ref 11.5–15.5)
MAGNESIUM: 2.1 MG/DL (ref 1.6–2.6)
MCH RBC QN AUTO: 29.1 PG (ref 26–35)
MCHC RBC AUTO-ENTMCNC: 32.5 % (ref 32–34.5)
MCV RBC AUTO: 89.7 FL (ref 80–99.9)
PDW BLD-RTO: 13.3 FL (ref 11.5–15)
PHOSPHORUS: 3.9 MG/DL (ref 2.5–4.5)
PLATELET # BLD: 349 E9/L (ref 130–450)
PMV BLD AUTO: 10.6 FL (ref 7–12)
POTASSIUM SERPL-SCNC: 3.8 MMOL/L (ref 3.5–5)
RBC # BLD: 3.4 E12/L (ref 3.5–5.5)
SODIUM BLD-SCNC: 138 MMOL/L (ref 132–146)
TOTAL PROTEIN: 6.4 G/DL (ref 6.4–8.3)
WBC # BLD: 10.1 E9/L (ref 4.5–11.5)

## 2020-09-10 PROCEDURE — 84100 ASSAY OF PHOSPHORUS: CPT

## 2020-09-10 PROCEDURE — 6370000000 HC RX 637 (ALT 250 FOR IP): Performed by: NURSE PRACTITIONER

## 2020-09-10 PROCEDURE — 2500000003 HC RX 250 WO HCPCS: Performed by: ANESTHESIOLOGY

## 2020-09-10 PROCEDURE — 80053 COMPREHEN METABOLIC PANEL: CPT

## 2020-09-10 PROCEDURE — 83735 ASSAY OF MAGNESIUM: CPT

## 2020-09-10 PROCEDURE — 85027 COMPLETE CBC AUTOMATED: CPT

## 2020-09-10 PROCEDURE — 2580000003 HC RX 258: Performed by: NURSE PRACTITIONER

## 2020-09-10 PROCEDURE — 36415 COLL VENOUS BLD VENIPUNCTURE: CPT

## 2020-09-10 PROCEDURE — 2140000000 HC CCU INTERMEDIATE R&B

## 2020-09-10 RX ADMIN — LABETALOL HYDROCHLORIDE 5 MG: 5 INJECTION INTRAVENOUS at 21:21

## 2020-09-10 RX ADMIN — LEVETIRACETAM 500 MG: 500 TABLET ORAL at 21:21

## 2020-09-10 RX ADMIN — Medication 10 ML: at 21:21

## 2020-09-10 RX ADMIN — ATORVASTATIN CALCIUM 80 MG: 80 TABLET, FILM COATED ORAL at 21:21

## 2020-09-10 RX ADMIN — POTASSIUM BICARBONATE 20 MEQ: 782 TABLET, EFFERVESCENT ORAL at 08:06

## 2020-09-10 RX ADMIN — ACETAMINOPHEN 1000 MG: 500 TABLET ORAL at 21:21

## 2020-09-10 RX ADMIN — ENALAPRIL MALEATE 10 MG: 10 TABLET ORAL at 08:06

## 2020-09-10 RX ADMIN — LEVETIRACETAM 500 MG: 500 TABLET ORAL at 08:06

## 2020-09-10 RX ADMIN — MAGNESIUM GLUCONATE 500 MG ORAL TABLET 400 MG: 500 TABLET ORAL at 08:06

## 2020-09-10 RX ADMIN — HYDROCODONE BITARTRATE AND ACETAMINOPHEN 1 TABLET: 5; 325 TABLET ORAL at 20:27

## 2020-09-10 RX ADMIN — ACETAMINOPHEN 1000 MG: 500 TABLET ORAL at 00:15

## 2020-09-10 RX ADMIN — Medication 10 ML: at 08:06

## 2020-09-10 RX ADMIN — METOPROLOL SUCCINATE 50 MG: 50 TABLET, EXTENDED RELEASE ORAL at 08:06

## 2020-09-10 ASSESSMENT — PAIN SCALES - GENERAL
PAINLEVEL_OUTOF10: 4
PAINLEVEL_OUTOF10: 0
PAINLEVEL_OUTOF10: 4
PAINLEVEL_OUTOF10: 0
PAINLEVEL_OUTOF10: 4
PAINLEVEL_OUTOF10: 3

## 2020-09-10 ASSESSMENT — PAIN DESCRIPTION - LOCATION: LOCATION: HEAD

## 2020-09-10 ASSESSMENT — PAIN DESCRIPTION - PROGRESSION: CLINICAL_PROGRESSION: NOT CHANGED

## 2020-09-10 ASSESSMENT — PAIN - FUNCTIONAL ASSESSMENT: PAIN_FUNCTIONAL_ASSESSMENT: ACTIVITIES ARE NOT PREVENTED

## 2020-09-10 ASSESSMENT — PAIN DESCRIPTION - ORIENTATION: ORIENTATION: UPPER

## 2020-09-10 ASSESSMENT — PAIN DESCRIPTION - ONSET: ONSET: GRADUAL

## 2020-09-10 ASSESSMENT — PAIN DESCRIPTION - PAIN TYPE: TYPE: ACUTE PAIN

## 2020-09-10 ASSESSMENT — PAIN DESCRIPTION - DESCRIPTORS: DESCRIPTORS: HEADACHE

## 2020-09-10 ASSESSMENT — PAIN DESCRIPTION - FREQUENCY: FREQUENCY: INTERMITTENT

## 2020-09-10 NOTE — PROGRESS NOTES
HOSPITALIST PROGRESS NOTE  Date: 9/10/2020   Name: Maureen Edge   MRN: [id number]   YOB: 1960      Subjective/Interval Hx:   Patient sitting up in bed stating that she feels a lot better speaking more freely  is at the bedside plans as far as any healthcare posthospitalization    Objective:   Physical Exam:   /73   Pulse 66   Temp 97.6 °F (36.4 °C) (Temporal)   Resp 16   Ht 5' 3\" (1.6 m)   Wt 196 lb 14.4 oz (89.3 kg)   SpO2 94%   BMI 34.88 kg/m²   General: no acute distress, well nourished and well hydrated  HEENT: NCAT  Heart: S1S2 RRR  Lungs: Clear to ascultation bilaterally, respiratory effort normal  Abdomen: soft, NT/ND, positive bowel sounds  Extremities: no pitting edema, nontender   Neuro: patient is awake, alert and orientated times 3, no gross deficits  Skin: no rashes or ecchymosis        Meds:   Meds:    sodium chloride flush  10 mL Intravenous Once    levETIRAcetam  500 mg Oral BID    potassium bicarb-citric acid  20 mEq Oral Daily    magnesium oxide  400 mg Oral Daily    sodium chloride flush  10 mL Intravenous 2 times per day    atorvastatin  80 mg Oral Nightly    enalapril  10 mg Oral Daily    metoprolol succinate  50 mg Oral Daily      Infusions:   PRN Meds: labetalol, 5 mg, Q10 Min PRN  benzocaine, , BID PRN  meclizine, 25 mg, 4x Daily PRN  acetaminophen, 1,000 mg, Q8H PRN  HYDROcodone 5 mg - acetaminophen, 1 tablet, Q6H PRN  sodium chloride flush, 10 mL, PRN  polyethylene glycol, 17 g, Daily PRN  ondansetron, 4 mg, Q6H PRN        Data/Labs:     Recent Labs     09/08/20  0443 09/09/20  0740 09/10/20  0444   WBC 13.8* 16.4* 10.1   HGB 10.0* 10.1* 9.9*   HCT 31.8* 31.8* 30.5*    418 349      Recent Labs     09/08/20  0443 09/09/20  0740 09/10/20  0444    139 138   K 4.2 4.0 3.8    101 102   CO2 26 28 24   PHOS  --  4.4 3.9   BUN 26* 18 21   CREATININE 0.6 0.6 0.5     Recent Labs     09/09/20  0740 09/10/20  0444   AST 13 14   ALT 23 20   BILITOT 0.6 0.4   ALKPHOS 69 61     Recent Labs     09/08/20  0443   INR 1.2     No results for input(s): CKTOTAL, CKMB, CKMBINDEX, TROPONINT in the last 72 hours. I/O last 3 completed shifts: In: 720 [P.O.:720]  Out: 725 [Urine:725]    Intake/Output Summary (Last 24 hours) at 9/10/2020 1357  Last data filed at 9/10/2020 1354  Gross per 24 hour   Intake 820 ml   Output 675 ml   Net 145 ml        Assessment/Plan:   1. Acute non-STEMI with elevated troponins- serial troponins, EKG in a.m. denies chest pain at this time  2. Cerebral aneurysm status post coiling per neurosurgery-patient just returned from procedure still in the anesthesia denies chest pain or headaches we will continue to monitor  09/09/2020- postop day per neurosurgery with micro coil of aneurysm, patient is doing well denies any pain or distress no headaches  3. Hypertension-on enalapril, metoprolol, monitor blood pressure  4. Electrolyte imbalance due to hypokalemia- supplementation given daily orally monitor lab studies  5.  Seizure disorder-Keppra twice daily, seizure precaution    DVT Prophylaxis: scd  Diet: DIET GENERAL; No Caffeine  Dietary Nutrition Supplements: Low Calorie High Protein Supplement  Dietary Nutrition Supplements: Wound Healing Oral Supplement  Code Status: Full Code    Dispo: when stable     Electronically signed by Kimberly Chavarria MD on 9/10/2020 at [address] [facility]

## 2020-09-10 NOTE — PROGRESS NOTES
Comprehensive Nutrition Assessment    Type and Reason for Visit:  Reassess    Nutrition Recommendations/Plan: Recommend and start Ensure High Protein supplement BID to help meet increased nutritional needs. Recommend and start Rom wound healing supplement BID to help assist with proper wound healing. Nutrition Assessment:  Patients po intake remains sporadic, averaging ~50% of meals served ; pt at further nutritional risk d/t increased needs from surgical wound healing (s/p crani w/ resection of meningioma 8/28) ; s/p aneurysm coiling 9/8 ; will re-start ONS    Malnutrition Assessment:  Malnutrition Status: At risk for malnutrition (Comment)    Context:  Acute Illness     Findings of the 6 clinical characteristics of malnutrition:  Energy Intake:  1 - 75% or less of estimated energy requirements for 7 or more days  Weight Loss:  Unable to assess(d/t lack of wt hx)     Body Fat Loss:  Unable to assess(data not available to assess at this time)     Muscle Mass Loss:  Unable to assess(data not available to assess at this time)    Fluid Accumulation:  No significant fluid accumulation     Strength:  Not Performed    Estimated Daily Nutrient Needs:  Energy (kcal):  4634-9948 (REE 1420 x 1.2 SF); Weight Used for Energy Requirements:  Current     Protein (g):  80-95 (1.5-1.8g/kg IBW);  Weight Used for Protein Requirements:  Ideal        Fluid (ml/day):  8542-2043; Weight Used for Fluid Requirements:  [place]      Nutrition Related Findings:  I&Os WNL, 1+ edema, active BS, rounded abd, A&O X 4, missing teeth, puncture site      Wounds:  Surgical Wound(Incision x 1 noted to head)       Current Nutrition Therapies:    DIET GENERAL; No Caffeine    Anthropometric Measures:  · Height: 5' 3\" (160 cm)  · Current Body Weight: 196 lb (88.9 kg)(9/10/20, bedscale)   · Admission Body Weight: 201 lb (91.2 kg)(8/24/20, no method)    · Usual Body Weight: (UTO ; no weights available in EMR history from previous encounters) · Ideal Body Weight: 115 lbs; % Ideal Body Weight 170.4 %   · BMI: 34.7  · BMI Categories: Obese Class 1 (BMI 30.0-34. 9)       Nutrition Diagnosis:   · Increased nutrient needs related to increase demand for energy/nutrients as evidenced by wounds      Nutrition Interventions:   Food and/or Nutrient Delivery:  Continue Current Diet, Start Oral Nutrition Supplement  Nutrition Education/Counseling:  Education not indicated   Coordination of Nutrition Care:  Continued Inpatient Monitoring    Goals:  Patient will consume ~75% of meals served       Nutrition Monitoring and Evaluation:   Behavioral-Environmental Outcomes:  Knowledge or Skill   Food/Nutrient Intake Outcomes:  Food and Nutrient Intake, Supplement Intake  Physical Signs/Symptoms Outcomes:  Biochemical Data, Chewing or Swallowing, GI Status, Fluid Status or Edema, Hemodynamic Status, Meal Time Behavior, Nutrition Focused Physical Findings, Skin, Weight     Discharge Planning:    Continue current diet     Electronically signed by Vivienne Barrios RD, LD on 9/10/20 at 10:57 AM EDT    Contact: [phone]

## 2020-09-10 NOTE — CARE COORDINATION
SOCIAL WORK/CASEMANAGEMENT TRANSITION OF CARE [id number] Krystal Wilson, [address], [place], [phone]): no bed until middle of next week here at [facility]. Not in network with [facility]. I met with spouse and he [facility]. They have accepted pt and will start precert, it may be back today if not at the latest tomorrow per [facility]. All discharge paperwork with exempt in place. covid test not needed unless symptomatic. Sw/cm to follow.  Steven Promise  9/10/2020

## 2020-09-10 NOTE — DISCHARGE INSTR - COC
Continuity of Care Form    Patient Name: Maureen Edge   :  1960  MRN:  [id number]    Admit date:  2020  Discharge date:  2020    Code Status Order: Full Code   Advance Directives:   Advance Care Flowsheet Documentation       Date/Time Healthcare Directive Type of Healthcare Directive Copy in [address] Agent's Name Healthcare Agent's Phone Number    20 0413  Yes, patient has an advance directive for healthcare treatment in soft chart  Durable power of  for health care  Yes, copy in chart  Spouse  Maribeth Hardy --    20 0830  Yes, patient has an advance directive for healthcare treatment in soft chart   Health care treatment directive  Yes, copy in chart  Spouse  rosales dial --    20 170  No, patient does not have an advance directive for healthcare treatment  --  --  --  -- --            Admitting Physician:  Edenilson Hendricks MD  PCP: No primary care provider on file. Discharging Nurse: [facility] Unit/Room#: 2591/0220-B  Discharging Unit Phone Number: [phone]    Emergency Contact:   Extended Emergency Contact Information  Primary Emergency Contact: Rosales Dial  Address: [address] [place] [address] Phone: [phone]  Mobile Phone: [phone]  Relation: Spouse    Past Surgical History:  Past Surgical History:   Procedure Laterality Date    BRAIN SURGERY      CRANIOTOMY N/A 2020    CRANIOTOMY FRAMELESS STEREOTATIC FOR BRAIN TUMOR performed by Milind Hare MD at [facility] 92 PERC CENTL NERV SYS  2020    IR OCCLUSIVE OR EMBOL PERC CENTL NERV SYS 2020 Chrsi Richardson MD [facility]    TONSILLECTOMY         Immunization History: There is no immunization history on file for this patient.     Active Problems:  Patient Active Problem List   Diagnosis Code    HTN (hypertension) I10    NSTEMI (non-ST elevated myocardial infarction) ([facility] I21.4    Elevated troponin R79.89    Cerebellar stroke (HCC) I63.9    S/P craniotomy Z98.890    S/P resection of meningioma Z98.890, Z86.018    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    Acute psychosis ([facility] F23       Isolation/Infection:   Isolation            No Isolation          Patient Infection Status       Infection Onset Added Last Indicated Last Indicated By Review Planned Expiration Resolved Resolved By    None active    Resolved    COVID-19 Rule Out 08/31/20 08/31/20 08/31/20 Covid-19 Ambulatory (Ordered)   09/01/20 Rule-Out Test Resulted            Nurse Assessment:  Last Vital Signs: /73   Pulse 66   Temp 97.6 °F (36.4 °C) (Temporal)   Resp 16   Ht 5' 3\" (1.6 m)   Wt 196 lb 14.4 oz (89.3 kg)   SpO2 94%   BMI 34.88 kg/m²     Last documented pain score (0-10 scale): Pain Level: 0  Last Weight:   Wt Readings from Last 1 Encounters:   09/10/20 196 lb 14.4 oz (89.3 kg)     Mental Status:  oriented, alert and occasionally slow to respond     IV Access:  - None    Nursing Mobility/ADLs:  Walking   Assisted  Transfer  Assisted  Bathing  Assisted  Dressing  Assisted  Toileting  Assisted  Feeding  Assisted  Med Admin  Independent  Med Delivery   whole    Wound Care Documentation and Therapy:        Elimination:  Continence: Bowel: Yes  Bladder: Yes  Urinary Catheter: None   Colostomy/Ileostomy/Ileal Conduit: No       Date of Last BM: 9/11/2020    Intake/Output Summary (Last 24 hours) at 9/10/2020 1118  Last data filed at 9/10/2020 0800  Gross per 24 hour   Intake 700 ml   Output 425 ml   Net 275 ml     I/O last 3 completed shifts: In: 5 [P.O.:720]  Out: 725 [Urine:725]    Safety Concerns: At risk for falls, at risk for seizures    Impairments/Disabilities:      Vision    Nutrition Therapy:  Current Nutrition Therapy:   - Oral Diet:  General    Routes of Feeding: Oral  Liquids:  Thin Liquids  Daily Fluid Restriction: no  Last Modified Barium Swallow with Video (Video

## 2020-09-10 NOTE — FLOWSHEET NOTE
Report called to [facility] RN. patient's belongings gathered. Transport called.  notified.  Pt to transfer to [facility] room 5455

## 2020-09-11 VITALS
OXYGEN SATURATION: 95 % | BODY MASS INDEX: 34.89 KG/M2 | HEART RATE: 70 BPM | DIASTOLIC BLOOD PRESSURE: 79 MMHG | RESPIRATION RATE: 16 BRPM | WEIGHT: 196.9 LBS | TEMPERATURE: 97 F | HEIGHT: 63 IN | SYSTOLIC BLOOD PRESSURE: 123 MMHG

## 2020-09-11 LAB
ALBUMIN SERPL-MCNC: 3.1 G/DL (ref 3.5–5.2)
ALP BLD-CCNC: 59 U/L (ref 35–104)
ALT SERPL-CCNC: 19 U/L (ref 0–32)
ANION GAP SERPL CALCULATED.3IONS-SCNC: 14 MMOL/L (ref 7–16)
AST SERPL-CCNC: 14 U/L (ref 0–31)
BILIRUB SERPL-MCNC: 0.4 MG/DL (ref 0–1.2)
BUN BLDV-MCNC: 19 MG/DL (ref 8–23)
CALCIUM SERPL-MCNC: 8.8 MG/DL (ref 8.6–10.2)
CHLORIDE BLD-SCNC: 100 MMOL/L (ref 98–107)
CO2: 24 MMOL/L (ref 22–29)
CREAT SERPL-MCNC: 0.5 MG/DL (ref 0.5–1)
GFR AFRICAN AMERICAN: >60
GFR NON-AFRICAN AMERICAN: >60 ML/MIN/1.73
GLUCOSE BLD-MCNC: 98 MG/DL (ref 74–99)
HCT VFR BLD CALC: 31 % (ref 34–48)
HEMOGLOBIN: 9.7 G/DL (ref 11.5–15.5)
MAGNESIUM: 2.2 MG/DL (ref 1.6–2.6)
MCH RBC QN AUTO: 28.6 PG (ref 26–35)
MCHC RBC AUTO-ENTMCNC: 31.3 % (ref 32–34.5)
MCV RBC AUTO: 91.4 FL (ref 80–99.9)
PDW BLD-RTO: 13.5 FL (ref 11.5–15)
PHOSPHORUS: 4.4 MG/DL (ref 2.5–4.5)
PLATELET # BLD: 357 E9/L (ref 130–450)
PMV BLD AUTO: 10.7 FL (ref 7–12)
POTASSIUM SERPL-SCNC: 4 MMOL/L (ref 3.5–5)
RBC # BLD: 3.39 E12/L (ref 3.5–5.5)
SODIUM BLD-SCNC: 138 MMOL/L (ref 132–146)
TOTAL PROTEIN: 6.5 G/DL (ref 6.4–8.3)
WBC # BLD: 9.6 E9/L (ref 4.5–11.5)

## 2020-09-11 PROCEDURE — 97116 GAIT TRAINING THERAPY: CPT

## 2020-09-11 PROCEDURE — 80053 COMPREHEN METABOLIC PANEL: CPT

## 2020-09-11 PROCEDURE — 85027 COMPLETE CBC AUTOMATED: CPT

## 2020-09-11 PROCEDURE — 6370000000 HC RX 637 (ALT 250 FOR IP): Performed by: NURSE PRACTITIONER

## 2020-09-11 PROCEDURE — 2580000003 HC RX 258: Performed by: NURSE PRACTITIONER

## 2020-09-11 PROCEDURE — 36415 COLL VENOUS BLD VENIPUNCTURE: CPT

## 2020-09-11 PROCEDURE — 97530 THERAPEUTIC ACTIVITIES: CPT

## 2020-09-11 PROCEDURE — 84100 ASSAY OF PHOSPHORUS: CPT

## 2020-09-11 PROCEDURE — 83735 ASSAY OF MAGNESIUM: CPT

## 2020-09-11 RX ORDER — ATORVASTATIN CALCIUM 80 MG/1
80 TABLET, FILM COATED ORAL NIGHTLY
Qty: 30 TABLET | Refills: 3
Start: 2020-09-11

## 2020-09-11 RX ORDER — MECLIZINE HYDROCHLORIDE 25 MG/1
25 TABLET ORAL 4 TIMES DAILY PRN
Qty: 30 TABLET | Refills: 0
Start: 2020-09-11 | End: 2020-09-21

## 2020-09-11 RX ORDER — ENALAPRIL MALEATE 10 MG/1
10 TABLET ORAL DAILY
Qty: 30 TABLET | Refills: 3
Start: 2020-09-12

## 2020-09-11 RX ORDER — HYDROCODONE BITARTRATE AND ACETAMINOPHEN 5; 325 MG/1; MG/1
1 TABLET ORAL EVERY 6 HOURS PRN
Qty: 12 TABLET | Refills: 0 | Status: SHIPPED | OUTPATIENT
Start: 2020-09-11 | End: 2020-09-14

## 2020-09-11 RX ORDER — METOPROLOL SUCCINATE 50 MG/1
50 TABLET, EXTENDED RELEASE ORAL DAILY
Qty: 30 TABLET | Refills: 3
Start: 2020-09-12

## 2020-09-11 RX ORDER — LEVETIRACETAM 500 MG/1
500 TABLET ORAL 2 TIMES DAILY
Qty: 60 TABLET | Refills: 3 | Status: ON HOLD | OUTPATIENT
Start: 2020-09-11 | End: 2022-10-09 | Stop reason: HOSPADM

## 2020-09-11 RX ADMIN — HYDROCODONE BITARTRATE AND ACETAMINOPHEN 1 TABLET: 5; 325 TABLET ORAL at 06:06

## 2020-09-11 RX ADMIN — METOPROLOL SUCCINATE 50 MG: 50 TABLET, EXTENDED RELEASE ORAL at 08:55

## 2020-09-11 RX ADMIN — POTASSIUM BICARBONATE 20 MEQ: 782 TABLET, EFFERVESCENT ORAL at 08:55

## 2020-09-11 RX ADMIN — ENALAPRIL MALEATE 10 MG: 10 TABLET ORAL at 08:55

## 2020-09-11 RX ADMIN — HYDROCODONE BITARTRATE AND ACETAMINOPHEN 1 TABLET: 5; 325 TABLET ORAL at 20:03

## 2020-09-11 RX ADMIN — Medication 10 ML: at 08:55

## 2020-09-11 RX ADMIN — LEVETIRACETAM 500 MG: 500 TABLET ORAL at 08:55

## 2020-09-11 RX ADMIN — MAGNESIUM GLUCONATE 500 MG ORAL TABLET 400 MG: 500 TABLET ORAL at 08:55

## 2020-09-11 RX ADMIN — ACETAMINOPHEN 1000 MG: 500 TABLET ORAL at 16:27

## 2020-09-11 ASSESSMENT — PAIN SCALES - GENERAL
PAINLEVEL_OUTOF10: 3
PAINLEVEL_OUTOF10: 8
PAINLEVEL_OUTOF10: 0
PAINLEVEL_OUTOF10: 3

## 2020-09-11 ASSESSMENT — PAIN DESCRIPTION - PAIN TYPE
TYPE: SURGICAL PAIN
TYPE: SURGICAL PAIN

## 2020-09-11 ASSESSMENT — PAIN DESCRIPTION - LOCATION
LOCATION: HEAD;INCISION
LOCATION: INCISION;HEAD

## 2020-09-11 ASSESSMENT — PAIN DESCRIPTION - PROGRESSION: CLINICAL_PROGRESSION: GRADUALLY WORSENING

## 2020-09-11 ASSESSMENT — PAIN DESCRIPTION - DESCRIPTORS: DESCRIPTORS: HEADACHE;ACHING;CONSTANT

## 2020-09-11 ASSESSMENT — PAIN DESCRIPTION - ONSET: ONSET: ON-GOING

## 2020-09-11 ASSESSMENT — PAIN DESCRIPTION - ORIENTATION: ORIENTATION: UPPER

## 2020-09-11 ASSESSMENT — PAIN DESCRIPTION - FREQUENCY: FREQUENCY: INTERMITTENT

## 2020-09-11 ASSESSMENT — PAIN - FUNCTIONAL ASSESSMENT: PAIN_FUNCTIONAL_ASSESSMENT: PREVENTS OR INTERFERES SOME ACTIVE ACTIVITIES AND ADLS

## 2020-09-11 NOTE — PROGRESS NOTES
BLE    Vitals:    Heart Rate post session 85 bpm     Therapeutic Exercises:     Seated LAQs, Marchs DF 2x15 reps BLE    Patient education  Pt educated on safety with transfers, gait training with use of WW, and benefits of increasing activity. Patient response to education:   Pt verbalized understanding Pt demonstrated skill Pt requires further education in this area   yes  yes     ASSESSMENT:    Comments:  Pt cleared by RN prior to session. Pt was received in supine and agreeable to PT session. Pt demonstrated delayed processing and required slightly extended time to process questions/instructions. Pt sat EOB >5 minutes for lightheadedness to resolve and HA to decrease due to increasing with position change. Pt required assist for lift with transfer and to extend trunk. Pt amb with decreased knee/hip flex and wide HAWA. VCs for heel strike and upright posture. Pt reported worsening dizziness with amb and was assisted to chair. Pt reported being fatigued and needing to use bed pan. Pt was assisted back to supine and rolling performed for placement of bed pan. Pt was left with call button within reach and bed alarm set. RN notified that pt was left on bed pan. Treatment:  Patient practiced and was instructed in the following treatment:     Bed mobility training - pt given verbal and tactile cues to facilitate proper sequencing and safety during rolling and supine<>sit as well as provided with physical assistance for trunk when sitting up and for RLE when laying down.  Sitting Balance EOB for >5 minutes for improved postural awareness, upright tolerance, TE performance and decreased in light headedness.  Transfer training with VCs for hand placement, sequencing and technique. Physical assist to complete task.  Standing balance retraining with VCs for upright posture and forward gaze. Physical assist to correct unsteadiness and to maintain upright posture.   Standing weight shifts to R/L with physical assist.      · Gait training- pt was given verbal and tactile cues to facilitate increased step height, heel-toe gait pattern, upright posture and body positioning inside walker during ambulation as well as provided with physical assistance to complete task. Pt required assist for [employer] management due to lifting off floor and poor placement. · Therapeutic Exercises/Activities as noted above.  Patient education provided with focus on upright tolerance, safety, correcting gait deviations and benefits of participating with therapy. PLAN:    Patient is making fair progress towards established goals. Will continue with current POC.       Time in  0905  Time out  0945    Total Treatment Time  40 minutes     CPT codes:  [x] Gait training 82226 10 minutes  [] Manual therapy [phone] - minutes  [x] Therapeutic activities 71712 30 minutes  [] Therapeutic exercises 00965 - minutes  [] Neuromuscular reeducation 53048 - minutes    Robert Coburn, PT, DPT  License [id number]

## 2020-09-11 NOTE — PROGRESS NOTES
Notified Dr. Augustus Cumimngs re: precert obtained, states okay to go to rehab tonight     [place]

## 2020-09-11 NOTE — PROGRESS NOTES
Attempted to call  re: precert being obtained, plan for transfer to [facility] tonight. No answer, no ability to leave voicemail.  Will continue to attempt to reach     [address] left for sister, Durward Alpers regarding transfer at [phone] (number provided by patient)    Catalina Lofton RN     Spoke with Zohra Pickardbobby, sister about transfer -she will notify rest of the family

## 2020-09-11 NOTE — PLAN OF CARE
Problem: Falls - Risk of:  Goal: Will remain free from falls  Description: Will remain free from falls  9/11/2020 1834 by Moira Enciso RN  Outcome: Completed     Problem: Falls - Risk of:  Goal: Absence of physical injury  Description: Absence of physical injury  Outcome: Completed     Problem: Skin Integrity:  Goal: Will show no infection signs and symptoms  Description: Will show no infection signs and symptoms  Outcome: Completed     Problem: Skin Integrity:  Goal: Absence of new skin breakdown  Description: Absence of new skin breakdown  Outcome: Completed     Problem: Pain:  Goal: Pain level will decrease  Description: Pain level will decrease  Outcome: Completed     Problem: Pain:  Goal: Control of acute pain  Description: Control of acute pain  9/11/2020 1834 by Moira Enciso RN  Outcome: Completed     Problem: Pain:  Goal: Control of chronic pain  Description: Control of chronic pain  Outcome: Completed     Problem: Discharge Planning:  Goal: Participates in care planning  Description: Participates in care planning  Outcome: Completed     Problem: Discharge Planning:  Goal: Discharged to appropriate level of care  Description: Discharged to appropriate level of care  Outcome: Completed     Problem: Airway Clearance - Ineffective:  Goal: Ability to maintain a clear airway will improve  Description: Ability to maintain a clear airway will improve  Outcome: Completed     Problem: Aspiration:  Goal: Absence of aspiration  Description: Absence of aspiration  Outcome: Completed     Problem:  Bowel Function - Altered:  Goal: Bowel elimination is within specified parameters  Description: Bowel elimination is within specified parameters  Outcome: Completed     Problem: Cardiac Output - Decreased:  Goal: Hemodynamic stability will improve  Description: Hemodynamic stability will improve  Outcome: Completed     Problem: Fluid Volume - Imbalance:  Goal: Absence of imbalanced fluid volume signs and symptoms  Description: Absence of imbalanced fluid volume signs and symptoms  Outcome: Completed     Problem: Gas Exchange - Impaired:  Goal: Levels of oxygenation will improve  Description: Levels of oxygenation will improve  Outcome: Completed     Problem: Mental Status - Impaired:  Goal: Mental status will be restored to baseline  Description: Mental status will be restored to baseline  Outcome: Completed     Problem: Nutrition Deficit:  Goal: Ability to achieve adequate nutritional intake will improve  Description: Ability to achieve adequate nutritional intake will improve  Outcome: Completed     Problem: Tissue Perfusion - Cardiopulmonary, Altered:  Goal: Hemodynamic stability will improve  Description: Hemodynamic stability will improve  Outcome: Completed     Problem: Tissue Perfusion - Cardiopulmonary, Altered:  Goal: Absence of angina  Description: Absence of angina  Outcome: Completed     Problem: Tissue Perfusion, Altered:  Goal: Circulatory function within specified parameters  Description: Circulatory function within specified parameters  Outcome: Completed     Problem: Sleep Pattern Disturbance:  Goal: Appears well-rested  Description: Appears well-rested  Outcome: Completed     Problem: Skin Integrity - Impaired:  Goal: Absence of new skin breakdown  Description: Absence of new skin breakdown  Outcome: Completed     Problem: Skin Integrity - Impaired:  Goal: Will show no infection signs and symptoms  Description: Will show no infection signs and symptoms  Outcome: Completed

## 2020-09-11 NOTE — PROGRESS NOTES
HOSPITALIST PROGRESS NOTE  Date: 9/11/2020   Name: Efraín Hernandez   MRN: [id number]   YOB: 1960      Subjective/Interval Hx:   Patient sitting up in bed stating that she feels a lot better speaking more freely  is at the bedside plans as far as any healthcare posthospitalization    Reports headache but states she didn't sleep well last night. States that advil usually helps her headaches but she doesn't want to take advil. Rates her pain as a 4/10.      Objective:   Physical Exam:   /79   Pulse 70   Temp 97 °F (36.1 °C) (Temporal)   Resp 16   Ht 5' 3\" (1.6 m)   Wt 196 lb 14.4 oz (89.3 kg)   SpO2 95%   BMI 34.88 kg/m²   General: no acute distress, well nourished and well hydrated  HEENT: NCAT  Heart: S1S2 RRR  Lungs: Clear to ascultation bilaterally, respiratory effort normal  Abdomen: soft, NT/ND, positive bowel sounds  Extremities: no pitting edema, nontender   Neuro: patient is awake, alert and orientated times 3, no gross deficits  Skin: no rashes or ecchymosis        Meds:   Meds:    sodium chloride flush  10 mL Intravenous Once    levETIRAcetam  500 mg Oral BID    potassium bicarb-citric acid  20 mEq Oral Daily    magnesium oxide  400 mg Oral Daily    sodium chloride flush  10 mL Intravenous 2 times per day    atorvastatin  80 mg Oral Nightly    enalapril  10 mg Oral Daily    metoprolol succinate  50 mg Oral Daily      Infusions:   PRN Meds: labetalol, 5 mg, Q10 Min PRN  benzocaine, , BID PRN  meclizine, 25 mg, 4x Daily PRN  acetaminophen, 1,000 mg, Q8H PRN  HYDROcodone 5 mg - acetaminophen, 1 tablet, Q6H PRN  sodium chloride flush, 10 mL, PRN  polyethylene glycol, 17 g, Daily PRN  ondansetron, 4 mg, Q6H PRN        Data/Labs:     Recent Labs     09/09/20  0740 09/10/20  0444 09/11/20  0431   WBC 16.4* 10.1 9.6   HGB 10.1* 9.9* 9.7*   HCT 31.8* 30.5* 31.0*    349 357      Recent Labs     09/09/20  0740 09/10/20  0444 09/11/20  0431    138 138   K 4.0 3.8 4.0    102 100   CO2 28 24 24   PHOS 4.4 3.9 4.4   BUN 18 21 19   CREATININE 0.6 0.5 0.5     Recent Labs     09/09/20  0740 09/10/20  0444 09/11/20  0431   AST 13 14 14   ALT 23 20 19   BILITOT 0.6 0.4 0.4   ALKPHOS 69 61 59     No results for input(s): INR in the last 72 hours. No results for input(s): CKTOTAL, CKMB, CKMBINDEX, TROPONINT in the last 72 hours. I/O last 3 completed shifts: In: 61 [P.O.:60]  Out: 600 [Urine:600]    Intake/Output Summary (Last 24 hours) at 9/11/2020 1644  Last data filed at 9/11/2020 0611  Gross per 24 hour   Intake 60 ml   Output 600 ml   Net -540 ml        Assessment/Plan:   1. Acute non-STEMI with elevated troponins- secondary to demand ischemia   2. Cerebral aneurysm status post coiling per neurosurgery  3. Leukocytosis - likely stress response, improving  4. Hypertension-on enalapril, metoprolol, monitor blood pressure  5. Electrolyte imbalance due to hypokalemia- supplementation given daily orally monitor lab studies  6. Seizure disorder-Keppra twice daily, seizure precaution  7. Deconditioning - PT/OT ordered, pt will likely need placement however precert is pending. If improves over weekend may be able to go home with [facility] while we wait for precert, whichever happens first   8.  Hypokalemia - potassium replacement continued     DVT Prophylaxis: scd  Diet: DIET GENERAL; No Caffeine  Dietary Nutrition Supplements: Low Calorie High Protein Supplement  Dietary Nutrition Supplements: Wound Healing Oral Supplement  Code Status: Full Code    Dispo: when stable     Electronically signed by Niranjan Mcgill MD on 9/11/2020 at 4:44 PM  [facility]

## 2020-09-11 NOTE — PROGRESS NOTES
Nurse to Nurse called to Amarilys at [address] [place] [address]. All pertinent information relayed, vital signs reviewed, all questions answered. Paperwork faxed.

## 2020-09-23 NOTE — DISCHARGE SUMMARY
Hospital Medicine Discharge Summary    Patient ID: Bobo Durham      Patient's PCP: No primary care provider on file. Admit Date: 8/23/2020     Discharge Date: 9/11/2020      Admitting Physician: Karuna aVrela MD     Discharge Physician: Harvinder Romero MD     Discharge Diagnoses: Active Hospital Problems    Diagnosis Date Noted    [facility] [F23]     Cerebellar stroke ([facility] [I63.9] 09/03/2020    S/P craniotomy [Z98.890] 09/03/2020    S/P resection of meningioma [Z98.890, Z86.018] 09/03/2020    Basilar [facility] aneurysm ([facility] [I72.5] 09/03/2020    Elevated troponin [R79.89]     NSTEMI (non-ST elevated myocardial infarction) ([facility] [I21.4] 08/23/2020       The patient was seen and examined on day of discharge and this discharge summary is in conjunction with any daily progress note from day of discharge. Hospital Course:   Pt is a 55 yo admitted on 8/23/30 with psychosis and elevated troponin levels in the setting of hypertension. Cardiology was consulted and determined elevated troponins were due to demand ischemia with hypertension. Antihypertensives uptitrated. Brain imaging positive for meningioma for which pt underwent craniotomy parietoocciptal for removal of meningioma and occiptal for removal of cyst. Pt was transferred out of the ICU on 9/1. On 9/2 found to have new basilar tip aneurysm for which pt underwent coiling on 9/8. Clinically improved however was deconditioned when working with PT/OT. Pt discharged in stable condition to SNF on 9/11/2020.          Exam:     /79   Pulse 70   Temp 97 °F (36.1 °C) (Temporal)   Resp 16   Ht 5' 3\" (1.6 m)   Wt 196 lb 14.4 oz (89.3 kg)   SpO2 95%   BMI 34.88 kg/m²     General: no acute distress, well nourished and well hydrated  HEENT: NCAT  Heart: S1S2 RRR  Lungs: Clear to ascultation bilaterally, respiratory effort normal  Abdomen: soft, NT/ND, positive bowel sounds  Extremities: no pitting edema, nontender   Neuro: patient is awake, alert and orientated times 3, no gross deficits  Skin: no rashes or ecchymosis    Consults:     IP CONSULT TO SOCIAL WORK  IP CONSULT TO PRIMARY CARE PROVIDER  IP CONSULT TO CARDIOLOGY  IP CONSULT TO PSYCHIATRY  IP CONSULT TO NEUROSURGERY  IP CONSULT TO NEUROSURGERY  IP CONSULT TO PHYSICAL MEDICINE REHAB    Significant Diagnostic Studies:     CT HEAD WO CONTRAST   Final Result   Findings compatible with sequela of previous surgery and craniotomy   Findings compatible with sequela of previous aneurysmal clip and   aneurysm coiling   Mass effect remains and is unchanged   Enhancement in the right cerebellum, MRI may be useful to further   delineate the lesion of the right cerebellum. ALERT:  THIS IS AN ABNORMAL REPORT         IR JAMIE CATH PLACE VERTEBRAL ART RIGHT W ANGIO   Final Result   1. Successful uncomplicated embolization of a basilar tip aneurysm   using micro-coils. IR OCCLUSION/EMBOLIZATION PERC CNS   Final Result   1. Successful uncomplicated embolization of a basilar tip aneurysm   using micro-coils. IR TRANSCATH EMBOLIZATION   Final Result   1. Successful uncomplicated embolization of a basilar tip aneurysm   using micro-coils. IR ANGIO THROUGH CATHETER FOLLOW UP TRANSCATHETER STUDY   Final Result   1. Successful uncomplicated embolization of a basilar tip aneurysm   using micro-coils. IR JAMIE CATH PLACE VERTEBRAL ART LEFT W ANGIO   Final Result   1. Successful uncomplicated embolization of a basilar tip aneurysm   using micro-coils. IR JAMIE CATH PLACE INT CAROTID INTRACRANIAL LEFT W ANGIO   Final Result   1. Successful uncomplicated embolization of a basilar tip aneurysm   using micro-coils. CTA HEAD W CONTRAST   Final Result      Basilar tip aneurysm measures approximately 8.6 cm in length and 5.7   cm in transverse dimensions. This is a broad necked aneurysm.       Large postsurgical defect medial left occipital lobe with essentially stable postoperative hemorrhage along the margins of the surgical   defect. Postoperative hematoma in the medial right occipital lobe continues to   decrease in size. Focal infarct of the superior aspect of the right cerebellum measuring   approximately 17 x 15 mm is unchanged. Encephalomalacia within the right temporal frontal and parietal   confluence from remote infarction. There is ex vacuo enlargement of   the right lateral ventricle. Left cerebral edema slightly improved. Prior anterior temporal fossa aneurysm clipping. Prior craniotomy defects of the right temporal bone, the posterior   midline occipital bone and bilateral parietal bones. CT HEAD WO CONTRAST   Final Result      1. Large postsurgical defect medial left occipital lobe with   decreasing postop hemorrhages. 2. Decreased postop hematoma in the inferior medial right occipital   lobe with surgical defect in the posterior falx. 3. Increasing size of infarct in the posterior superior right   cerebellum. 4. Left cerebral edema showing a slight interval increase. 5. 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 CT HEAD WO CONTRAST   Final Result   Stable postoperative changes. No evidence of increasing hemorrhage or   mass effect. XR CHEST PORTABLE   Final Result   Subsegmental atelectasis in the right base. Right internal jugular central venous catheter tip in the SVC right   atrial junction. No pneumothorax on the right on the left. CT HEAD WO CONTRAST PORTABLE   Final Result   1. Status post recent post operatory changes with left posterior   parietal craniotomy and removal of the previous large expansile   extra-axial lesion. 2. There is significant regression of the mass effect and vasogenic   edema although not resolved.       3. Midline shifted to the right is minimal.      CT HEAD W WO CONTRAST   Final Result   1. CT scan of the brain bravo thickening for were no indication. 2. Large meningioma for the left occipital region as described on   previous study. 3. 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 CT HEAD WO CONTRAST   Final Result   Significant progression in the size of the posterior left   para sagittal meningioma which presently causes significant mass   effect in the left occipital lobe, depressing the left-sided tentorium   inferiorly and causing vasogenic edema in the left occipital lobe   parenchyma. ALERT:  ABNORMAL REPORT. US DUP LOWER EXTREMITIES BILATERAL VENOUS   Final Result      No evidence for deep vein thrombosis of the lower extremities. XR CHEST PORTABLE   Final Result      NO ACUTE CARDIOPULMONARY PROCESS               Disposition:  SNF       Discharge Instructions/Follow-up:  Keep scheduled follow up appointments. Take medications as prescribed. Code Status:  Prior     Activity: activity as tolerated    Diet: regular diet    Labs: For convenience and continuity at follow-up the following most recent labs are provided:      CBC:    Lab Results   Component Value Date    WBC 9.6 09/11/2020    HGB 9.7 09/11/2020    HCT 31.0 09/11/2020     09/11/2020       Renal:    Lab Results   Component Value Date     09/11/2020    K 4.0 09/11/2020    K 4.6 08/24/2020     09/11/2020    CO2 24 09/11/2020    BUN 19 09/11/2020    CREATININE 0.5 09/11/2020    CALCIUM 8.8 09/11/2020    PHOS 4.4 09/11/2020       Discharge Medications:     Discharge Medication List as of 9/11/2020  6:29 PM           Details   HYDROcodone-acetaminophen (NORCO) 5-325 MG per tablet Take 1 tablet by mouth every 6 hours as needed for Pain for up to 3 days. , Disp-12 tablet,R-0Print      levETIRAcetam (KEPPRA) 500 MG tablet Take 1 tablet by mouth 2 times daily, Disp-60 tablet,R-3Normal      meclizine (ANTIVERT) 25 MG tablet Take 1 tablet by mouth 4 times daily as

## 2021-03-26 ENCOUNTER — HOSPITAL ENCOUNTER (OUTPATIENT)
Dept: PULMONOLOGY | Age: 61
Discharge: HOME | End: 2021-03-26
Payer: MEDICAID

## 2021-03-26 DIAGNOSIS — R06.02 SOB (SHORTNESS OF BREATH): ICD-10-CM

## 2021-03-26 PROCEDURE — 94726 PLETHYSMOGRAPHY LUNG VOLUMES: CPT | Performed by: INTERNAL MEDICINE

## 2021-03-26 PROCEDURE — 94729 DIFFUSING CAPACITY: CPT | Performed by: INTERNAL MEDICINE

## 2021-03-26 PROCEDURE — 94060 EVALUATION OF WHEEZING: CPT | Performed by: INTERNAL MEDICINE

## 2021-03-26 PROCEDURE — 94729 DIFFUSING CAPACITY: CPT

## 2021-03-26 PROCEDURE — 94726 PLETHYSMOGRAPHY LUNG VOLUMES: CPT

## 2021-03-26 PROCEDURE — 94060 EVALUATION OF WHEEZING: CPT

## 2021-07-09 ENCOUNTER — HOSPITAL ENCOUNTER (OUTPATIENT)
Dept: MRI IMAGING | Age: 61
Discharge: HOME | End: 2021-07-11
Payer: MEDICAID

## 2021-07-09 DIAGNOSIS — D32.9 MENINGIOMA (HCC): ICD-10-CM

## 2021-07-28 ENCOUNTER — HOSPITAL ENCOUNTER (OUTPATIENT)
Age: 61
Discharge: HOME | End: 2021-07-28
Payer: MEDICAID

## 2021-07-28 ENCOUNTER — HOSPITAL ENCOUNTER (OUTPATIENT)
Dept: MRI IMAGING | Age: 61
Discharge: HOME | End: 2021-07-30
Payer: MEDICAID

## 2021-07-28 DIAGNOSIS — D32.9 MENINGIOMA (HCC): ICD-10-CM

## 2021-07-28 DIAGNOSIS — Z98.890 S/P CRANIOTOMY: ICD-10-CM

## 2021-07-28 DIAGNOSIS — I72.5 BASILAR ARTERY ANEURYSM (HCC): ICD-10-CM

## 2021-07-28 LAB
ANION GAP SERPL CALCULATED.3IONS-SCNC: 11 MMOL/L (ref 7–16)
BUN BLDV-MCNC: 12 MG/DL (ref 6–23)
CALCIUM SERPL-MCNC: 8.6 MG/DL (ref 8.6–10.2)
CHLORIDE BLD-SCNC: 103 MMOL/L (ref 98–107)
CO2: 24 MMOL/L (ref 22–29)
CREAT SERPL-MCNC: 0.7 MG/DL (ref 0.5–1)
GFR AFRICAN AMERICAN: >60
GFR NON-AFRICAN AMERICAN: >60 ML/MIN/1.73
GLUCOSE BLD-MCNC: 94 MG/DL (ref 74–99)
POTASSIUM SERPL-SCNC: 4.1 MMOL/L (ref 3.5–5)
SODIUM BLD-SCNC: 138 MMOL/L (ref 132–146)

## 2021-07-28 PROCEDURE — 36415 COLL VENOUS BLD VENIPUNCTURE: CPT

## 2021-07-28 PROCEDURE — 80048 BASIC METABOLIC PNL TOTAL CA: CPT

## 2021-08-07 ENCOUNTER — HOSPITAL ENCOUNTER (OUTPATIENT)
Dept: MRI IMAGING | Age: 61
Discharge: HOME | End: 2021-08-09
Payer: MEDICAID

## 2021-08-07 PROCEDURE — 6360000004 HC RX CONTRAST MEDICATION: Performed by: STUDENT IN AN ORGANIZED HEALTH CARE EDUCATION/TRAINING PROGRAM

## 2021-08-07 PROCEDURE — 70553 MRI BRAIN STEM W/O & W/DYE: CPT

## 2021-08-07 PROCEDURE — A9579 GAD-BASE MR CONTRAST NOS,1ML: HCPCS | Performed by: STUDENT IN AN ORGANIZED HEALTH CARE EDUCATION/TRAINING PROGRAM

## 2021-08-07 RX ADMIN — GADOTERIDOL 18 ML: 279.3 INJECTION, SOLUTION INTRAVENOUS at 10:33

## 2022-10-03 ENCOUNTER — HOSPITAL ENCOUNTER (INPATIENT)
Age: 62
LOS: 3 days | Discharge: PSYCHIATRIC OR SUBSTANCE USE FACILITY | DRG: 753 | End: 2022-10-10
Attending: EMERGENCY MEDICINE | Admitting: INTERNAL MEDICINE
Payer: MEDICAID

## 2022-10-03 ENCOUNTER — APPOINTMENT (OUTPATIENT)
Dept: CT IMAGING | Age: 62
DRG: 753 | End: 2022-10-03
Payer: MEDICAID

## 2022-10-03 DIAGNOSIS — Z76.89 ENCOUNTER FOR PSYCHIATRIC ASSESSMENT: Primary | ICD-10-CM

## 2022-10-03 DIAGNOSIS — R82.81 PYURIA: ICD-10-CM

## 2022-10-03 DIAGNOSIS — R44.3 HALLUCINATIONS: ICD-10-CM

## 2022-10-03 LAB
ACETAMINOPHEN LEVEL: <5 MCG/ML (ref 10–30)
ALBUMIN SERPL-MCNC: 4.2 G/DL (ref 3.5–5.2)
ALP BLD-CCNC: 74 U/L (ref 35–104)
ALT SERPL-CCNC: 20 U/L (ref 0–32)
AMPHETAMINE SCREEN, URINE: NOT DETECTED
ANION GAP SERPL CALCULATED.3IONS-SCNC: 14 MMOL/L (ref 7–16)
AST SERPL-CCNC: 33 U/L (ref 0–31)
BACTERIA: ABNORMAL /HPF
BARBITURATE SCREEN URINE: NOT DETECTED
BASOPHILS ABSOLUTE: 0.05 E9/L (ref 0–0.2)
BASOPHILS RELATIVE PERCENT: 0.5 % (ref 0–2)
BENZODIAZEPINE SCREEN, URINE: NOT DETECTED
BILIRUB SERPL-MCNC: 1.3 MG/DL (ref 0–1.2)
BILIRUBIN URINE: NEGATIVE
BLOOD, URINE: NEGATIVE
BUN BLDV-MCNC: 16 MG/DL (ref 6–23)
CALCIUM SERPL-MCNC: 9.7 MG/DL (ref 8.6–10.2)
CANNABINOID SCREEN URINE: NOT DETECTED
CHLORIDE BLD-SCNC: 101 MMOL/L (ref 98–107)
CLARITY: CLEAR
CO2: 26 MMOL/L (ref 22–29)
COCAINE METABOLITE SCREEN URINE: NOT DETECTED
COLOR: YELLOW
CREAT SERPL-MCNC: 0.8 MG/DL (ref 0.5–1)
EKG ATRIAL RATE: 81 BPM
EKG P AXIS: 47 DEGREES
EKG P-R INTERVAL: 172 MS
EKG Q-T INTERVAL: 448 MS
EKG QRS DURATION: 70 MS
EKG QTC CALCULATION (BAZETT): 520 MS
EKG R AXIS: -36 DEGREES
EKG T AXIS: 46 DEGREES
EKG VENTRICULAR RATE: 81 BPM
EOSINOPHILS ABSOLUTE: 0.16 E9/L (ref 0.05–0.5)
EOSINOPHILS RELATIVE PERCENT: 1.5 % (ref 0–6)
ETHANOL: <10 MG/DL (ref 0–0.08)
FENTANYL SCREEN, URINE: NOT DETECTED
GFR AFRICAN AMERICAN: >60
GFR NON-AFRICAN AMERICAN: >60 ML/MIN/1.73
GLUCOSE BLD-MCNC: 99 MG/DL (ref 74–99)
GLUCOSE URINE: NEGATIVE MG/DL
HCT VFR BLD CALC: 42.5 % (ref 34–48)
HEMOGLOBIN: 14.3 G/DL (ref 11.5–15.5)
IMMATURE GRANULOCYTES #: 0.05 E9/L
IMMATURE GRANULOCYTES %: 0.5 % (ref 0–5)
INFLUENZA A: NOT DETECTED
INFLUENZA B: NOT DETECTED
KETONES, URINE: 15 MG/DL
LEUKOCYTE ESTERASE, URINE: ABNORMAL
LYMPHOCYTES ABSOLUTE: 1.83 E9/L (ref 1.5–4)
LYMPHOCYTES RELATIVE PERCENT: 17.2 % (ref 20–42)
Lab: NORMAL
MCH RBC QN AUTO: 30.1 PG (ref 26–35)
MCHC RBC AUTO-ENTMCNC: 33.6 % (ref 32–34.5)
MCV RBC AUTO: 89.5 FL (ref 80–99.9)
METHADONE SCREEN, URINE: NOT DETECTED
MONOCYTES ABSOLUTE: 1.07 E9/L (ref 0.1–0.95)
MONOCYTES RELATIVE PERCENT: 10 % (ref 2–12)
NEUTROPHILS ABSOLUTE: 7.5 E9/L (ref 1.8–7.3)
NEUTROPHILS RELATIVE PERCENT: 70.3 % (ref 43–80)
NITRITE, URINE: NEGATIVE
OPIATE SCREEN URINE: NOT DETECTED
OXYCODONE URINE: NOT DETECTED
PDW BLD-RTO: 12.7 FL (ref 11.5–15)
PH UA: 6 (ref 5–9)
PHENCYCLIDINE SCREEN URINE: NOT DETECTED
PLATELET # BLD: 292 E9/L (ref 130–450)
PMV BLD AUTO: 10.5 FL (ref 7–12)
POTASSIUM REFLEX MAGNESIUM: 3.7 MMOL/L (ref 3.5–5)
PROTEIN UA: NEGATIVE MG/DL
RBC # BLD: 4.75 E12/L (ref 3.5–5.5)
RBC UA: ABNORMAL /HPF (ref 0–2)
SALICYLATE, SERUM: <0.3 MG/DL (ref 0–30)
SARS-COV-2 RNA, RT PCR: NOT DETECTED
SODIUM BLD-SCNC: 141 MMOL/L (ref 132–146)
SPECIFIC GRAVITY UA: 1.02 (ref 1–1.03)
TOTAL PROTEIN: 8 G/DL (ref 6.4–8.3)
TRICYCLIC ANTIDEPRESSANTS SCREEN SERUM: NEGATIVE NG/ML
UROBILINOGEN, URINE: 0.2 E.U./DL
WBC # BLD: 10.7 E9/L (ref 4.5–11.5)
WBC UA: ABNORMAL /HPF (ref 0–5)

## 2022-10-03 PROCEDURE — 82077 ASSAY SPEC XCP UR&BREATH IA: CPT

## 2022-10-03 PROCEDURE — 85025 COMPLETE CBC W/AUTO DIFF WBC: CPT

## 2022-10-03 PROCEDURE — 6360000002 HC RX W HCPCS: Performed by: EMERGENCY MEDICINE

## 2022-10-03 PROCEDURE — 96372 THER/PROPH/DIAG INJ SC/IM: CPT

## 2022-10-03 PROCEDURE — 80179 DRUG ASSAY SALICYLATE: CPT

## 2022-10-03 PROCEDURE — 87636 SARSCOV2 & INF A&B AMP PRB: CPT

## 2022-10-03 PROCEDURE — 99285 EMERGENCY DEPT VISIT HI MDM: CPT

## 2022-10-03 PROCEDURE — 96374 THER/PROPH/DIAG INJ IV PUSH: CPT

## 2022-10-03 PROCEDURE — 87088 URINE BACTERIA CULTURE: CPT

## 2022-10-03 PROCEDURE — 81001 URINALYSIS AUTO W/SCOPE: CPT

## 2022-10-03 PROCEDURE — 6370000000 HC RX 637 (ALT 250 FOR IP): Performed by: EMERGENCY MEDICINE

## 2022-10-03 PROCEDURE — 80307 DRUG TEST PRSMV CHEM ANLYZR: CPT

## 2022-10-03 PROCEDURE — 70450 CT HEAD/BRAIN W/O DYE: CPT

## 2022-10-03 PROCEDURE — 96375 TX/PRO/DX INJ NEW DRUG ADDON: CPT

## 2022-10-03 PROCEDURE — 6360000002 HC RX W HCPCS

## 2022-10-03 PROCEDURE — 80053 COMPREHEN METABOLIC PANEL: CPT

## 2022-10-03 PROCEDURE — 80143 DRUG ASSAY ACETAMINOPHEN: CPT

## 2022-10-03 PROCEDURE — 93005 ELECTROCARDIOGRAM TRACING: CPT

## 2022-10-03 RX ORDER — ENALAPRIL MALEATE 5 MG/1
10 TABLET ORAL DAILY
Status: DISCONTINUED | OUTPATIENT
Start: 2022-10-03 | End: 2022-10-05

## 2022-10-03 RX ORDER — METOPROLOL SUCCINATE 25 MG/1
50 TABLET, EXTENDED RELEASE ORAL ONCE
Status: COMPLETED | OUTPATIENT
Start: 2022-10-03 | End: 2022-10-03

## 2022-10-03 RX ORDER — LEVETIRACETAM 500 MG/1
500 TABLET ORAL 2 TIMES DAILY
Status: DISCONTINUED | OUTPATIENT
Start: 2022-10-03 | End: 2022-10-05

## 2022-10-03 RX ORDER — DROPERIDOL 2.5 MG/ML
INJECTION, SOLUTION INTRAMUSCULAR; INTRAVENOUS
Status: DISCONTINUED
Start: 2022-10-03 | End: 2022-10-03 | Stop reason: WASHOUT

## 2022-10-03 RX ORDER — CEFDINIR 300 MG/1
300 CAPSULE ORAL EVERY 12 HOURS SCHEDULED
Status: DISCONTINUED | OUTPATIENT
Start: 2022-10-03 | End: 2022-10-04

## 2022-10-03 RX ORDER — MIDAZOLAM HYDROCHLORIDE 2 MG/2ML
1 INJECTION, SOLUTION INTRAMUSCULAR; INTRAVENOUS ONCE
Status: COMPLETED | OUTPATIENT
Start: 2022-10-03 | End: 2022-10-03

## 2022-10-03 RX ORDER — MIDAZOLAM HYDROCHLORIDE 2 MG/2ML
2 INJECTION, SOLUTION INTRAMUSCULAR; INTRAVENOUS ONCE
Status: COMPLETED | OUTPATIENT
Start: 2022-10-03 | End: 2022-10-03

## 2022-10-03 RX ADMIN — METOPROLOL SUCCINATE 50 MG: 25 TABLET, EXTENDED RELEASE ORAL at 17:06

## 2022-10-03 RX ADMIN — MIDAZOLAM HYDROCHLORIDE 1 MG: 1 INJECTION, SOLUTION INTRAMUSCULAR; INTRAVENOUS at 15:27

## 2022-10-03 RX ADMIN — ENALAPRIL MALEATE 10 MG: 5 TABLET ORAL at 17:06

## 2022-10-03 RX ADMIN — MIDAZOLAM 2 MG: 1 INJECTION, SOLUTION INTRAMUSCULAR; INTRAVENOUS at 17:05

## 2022-10-03 NOTE — ED NOTES
Pt reports \"hearing sounds. \" Pt repeats saying \"I can not be moved. \" pt knows her name, , and the year. Pt does not know the president.       Vijay Figueroa RN  10/03/22 2173

## 2022-10-03 NOTE — CARE COORDINATION
Social Work /Transition of Care:    Pt presents to the ED secondary to altered mental status. Pt sent to ED per family's request, from [address] [facility]. Per report from nursing facility, pt has been hallucinating, anxious, irritable, and manic. Pt has prior mental health history of major depressive disorder, severe anxiety, OCD, and hoarding disorder. Pt treats with Dr Monique Land. Pt has a PRN order for Vistaril which was given at the facility prior to arrival to ED. Per liaison at facility, pt is a long term care bed hold. Gretta Joshi in [facility] AN AFFILIATE OF [facility] is aware of pt.

## 2022-10-03 NOTE — LETTER
[place] Department Medicaid  CERTIFICATION OF NECESSITY  FOR NON-EMERGENCY TRANSPORTATION   BY GROUND AMBULANCE      Individual Information   1. Name: Juventino Willett 2. [place] Medicaid Billing Number: [id number]     3. Address: [address] [place] [address] Provider Information   4. Provider Name:    5. [place] Provider Number:  National Provider Identifier (NPI):      Certification  7. Criteria:  During transport, this individual requires:  [x] Medical treatment or continuous     supervision by an EMT. [] The administration or regulation of oxygen by another person. [] Supervised protective restraint. 8. Period Beginning Date: 10/      /2022   9. Length  [x] Not more than 1 day(s)  [] One Year     Additional Information Relevant to Certification   10. Comments or Explanations, If Necessary or Appropriate     Confused at risk for fall, hallucinations and delirium      Certifying Practitioner Information   11. Name of Practitioner: SANDRA Peterson 2 Medicaid Provider Number, If Applicable:  [address] Provider Identifier (NPI):      Signature Information   14. Date of Signature: 10/       /2022 15. Name of Person Signing: Bibi Gar    12. Signature and Professional Designation:      [facility] [id number]  Rev. 2015          Juventino Willett : 1960 (62 yrs)    Address: [address] [place] [address] Sex: Female   [place] city: [address] [place] [address]         Marital Status:    Employer: NOT EMPLOYED         Baptism: Islam   Primary Care Provider: DO Lila Poole Phone: [phone]   EMERGENCY CONTACT   Contact Name Legal Guardian?  Relationship to Patient Home Phone Work Phone   1. Cassie leal  2. *No Contact Specified*      Brother/Sister    [phone]                 GUARANTOR            Guarantor: Juventino Willett     : 1960   Address: [address] [place] [address] Sex: Female     [place] [address]     Relation to Patient: Self       Home Phone: [phone]   Guarantor ID: [id number]       Work Phone:     Guarantor Employer: NOT EMPLOYED         Status: NOT EMPLO*      COVERAGE        PRIMARY INSURANCE   Payor: MEDICAID [place] Plan: MEDICAID [place] DEPT OF*   Payor Address: [facility],  [place], [address]       Group Number:   Insurance Type: INDEMNITY   Subscriber Name: Senthil Zain : 1960   Subscriber ID: [id number] [facility].  Rel. to Sub: Self       Ss#

## 2022-10-03 NOTE — ED NOTES
Dr. Lyle Claudio made aware that pts sister is at bedside. Dr. Lyle Claudio also made aware that pt gets a yearly MRI done and she is overdue for this year's MRI.       Lacey Barahona RN  10/03/22 3997

## 2022-10-03 NOTE — ED PROVIDER NOTES
Moises Baeza is a 57 y.o. female    Chief Complaint   Patient presents with    Altered Mental Status     Pt sent in from [facility] for hallucinations. Talking out of her normal state. HPI   Moises Baeza is a 57 y.o. female presenting to the ED for Altered Mental Status (Pt sent in from [facility] for hallucinations. Talking out of her normal state. )    History comes primarily from [facility] and EMS. Presents for altered mental status. When questioned she says that she is completely insane but cannot describe a reason for why this is or why she is here in the hospital.  She is alert and oriented x1 at best.  She has no other complaints at this time. She is coming from [address] [place] [address]. Review of Systems   Unable to perform ROS: Mental status change   Psychiatric/Behavioral:  Positive for agitation and behavioral problems. Physical Exam  Vitals and nursing note reviewed. Constitutional:       General: She is not in acute distress. Appearance: Normal appearance. She is not ill-appearing. HENT:      Head: Normocephalic and atraumatic. Right Ear: External ear normal.      Left Ear: External ear normal.      Nose: Nose normal. No rhinorrhea. Mouth/Throat:      Mouth: Mucous membranes are moist.      Pharynx: Oropharynx is clear. Eyes:      Extraocular Movements: Extraocular movements intact. Conjunctiva/sclera: Conjunctivae normal.      Pupils: Pupils are equal, round, and reactive to light. Cardiovascular:      Rate and Rhythm: Normal rate and regular rhythm. Pulses: Normal pulses. Heart sounds: Normal heart sounds. No murmur heard. Pulmonary:      Effort: Pulmonary effort is normal. No respiratory distress. Breath sounds: Normal breath sounds. No wheezing. Abdominal:      General: Bowel sounds are normal. There is no distension. Palpations: Abdomen is soft. Tenderness: There is no abdominal tenderness. Musculoskeletal:         General: No swelling or deformity. Normal range of motion. Cervical back: Normal range of motion and neck supple. No rigidity. Skin:     General: Skin is warm and dry. Coloration: Skin is not jaundiced or pale. Neurological:      General: No focal deficit present. Mental Status: She is alert. She is disoriented and confused. Cranial Nerves: Cranial nerves 2-12 are intact. Sensory: Sensation is intact. Motor: No weakness. Psychiatric:         Attention and Perception: She is inattentive. Mood and Affect: Mood is anxious. Speech: Speech normal.         Behavior: Behavior is agitated. Procedures     MDM  Patient presented to the Emergency Department for Altered Mental Status (Pt sent in from [facility] for hallucinations. Talking out of her normal state. )    Based on my conversation with EMS and the patient's sister, it seems like the patient is having some sort of behavioral issue related to her recent divorce and has had similar episodes in the past with a diagnosis of acute psychosis in her problem list.  Patient has had previous aneurysm but no evidence of acute issues currently. She does appear to have pyuria and she will be treated with oral antibiotics. After her work-up, the patient is medically cleared for psychiatric evaluation. EKG: This EKG is signed and interpreted by me. Rate: 81  Rhythm: sinus  Axis: left  Interpretation: no acute changes, qtc prolongation 520ms. Comparison: stable as compared to patient's most recent EKG    ED Course as of 10/03/22 1632   Mon Oct 03, 2022   1626 Discussed the patient with her sister who is now present. The sister says that the patient has been under a lot of stress for the last 2 years due to an impending divorce, the divorce which was finalized this last week.   She says that the patient started having altered behavior a day or 2 afterwards and has progressively worsened. She also states that the patient has some depression and OCD and is a hoarder. [KS]      ED Course User Index  [KS] Bobbe Runner, MD       --------------------------------------------- PAST HISTORY ---------------------------------------------  Past Medical History:  has a past medical history of Brain aneurysm, Hypertension, and Lumbar herniated disc. Past Surgical History:  has a past surgical history that includes brain surgery; Tonsillectomy; craniotomy (N/A, 8/28/2020); and IR OCCLUSION/EMBOLIZATION PERC CNS (9/8/2020). Social History:  reports that she quit smoking about 13 years ago. Her smoking use included cigarettes. She does not have any smokeless tobacco history on file. She reports that she does not drink alcohol and does not use drugs. Family History: family history is not on file. The patients home medications have been reviewed. Allergies: Patient has no known allergies.     -------------------------------------------------- RESULTS -------------------------------------------------    LABS:  Results for orders placed or performed during the hospital encounter of 10/03/22   COVID-19 & Influenza Combo    Specimen: Nasopharyngeal Swab   Result Value Ref Range    SARS-CoV-2 RNA, RT PCR NOT DETECTED NOT DETECTED    INFLUENZA A NOT DETECTED NOT DETECTED    INFLUENZA B NOT DETECTED NOT DETECTED   Urine Drug Screen   Result Value Ref Range    Amphetamine Screen, Urine NOT DETECTED Negative <1000 ng/mL    Barbiturate Screen, Ur NOT DETECTED Negative < 200 ng/mL    Benzodiazepine Screen, Urine NOT DETECTED Negative < 200 ng/mL    Cannabinoid Scrn, Ur NOT DETECTED Negative < 50ng/mL    Cocaine Metabolite Screen, Urine NOT DETECTED Negative < 300 ng/mL    Opiate Scrn, Ur NOT DETECTED Negative < 300ng/mL    PCP Screen, Urine NOT DETECTED Negative < 25 ng/mL    Methadone Screen, Urine NOT DETECTED Negative <300 ng/mL    Oxycodone Urine NOT DETECTED Negative <100 ng/mL FENTANYL SCREEN, URINE NOT DETECTED Negative <1 ng/mL    Drug Screen Comment: see below    Serum Drug Screen   Result Value Ref Range    Ethanol Lvl <10 mg/dL    Acetaminophen Level <5.0 (L) 10.0 - 39.6 mcg/mL    Salicylate, Serum <4.2 0.0 - 30.0 mg/dL    TCA Scrn NEGATIVE Cutoff:300 ng/mL   Comprehensive Metabolic Panel w/ Reflex to MG   Result Value Ref Range    Sodium 141 132 - 146 mmol/L    Potassium reflex Magnesium 3.7 3.5 - 5.0 mmol/L    Chloride 101 98 - 107 mmol/L    CO2 26 22 - 29 mmol/L    Anion Gap 14 7 - 16 mmol/L    Glucose 99 74 - 99 mg/dL    BUN 16 6 - 23 mg/dL    Creatinine 0.8 0.5 - 1.0 mg/dL    GFR Non-African American >60 >=60 mL/min/1.73    GFR African American >60     Calcium 9.7 8.6 - 10.2 mg/dL    Total Protein 8.0 6.4 - 8.3 g/dL    Albumin 4.2 3.5 - 5.2 g/dL    Total Bilirubin 1.3 (H) 0.0 - 1.2 mg/dL    Alkaline Phosphatase 74 35 - 104 U/L    ALT 20 0 - 32 U/L    AST 33 (H) 0 - 31 U/L   CBC with Auto Differential   Result Value Ref Range    WBC 10.7 4.5 - 11.5 E9/L    RBC 4.75 3.50 - 5.50 E12/L    Hemoglobin 14.3 11.5 - 15.5 g/dL    Hematocrit 42.5 34.0 - 48.0 %    MCV 89.5 80.0 - 99.9 fL    MCH 30.1 26.0 - 35.0 pg    MCHC 33.6 32.0 - 34.5 %    RDW 12.7 11.5 - 15.0 fL    Platelets 415 449 - 365 E9/L    MPV 10.5 7.0 - 12.0 fL    Neutrophils % 70.3 43.0 - 80.0 %    Immature Granulocytes % 0.5 0.0 - 5.0 %    Lymphocytes % 17.2 (L) 20.0 - 42.0 %    Monocytes % 10.0 2.0 - 12.0 %    Eosinophils % 1.5 0.0 - 6.0 %    Basophils % 0.5 0.0 - 2.0 %    Neutrophils Absolute 7.50 (H) 1.80 - 7.30 E9/L    Immature Granulocytes # 0.05 E9/L    Lymphocytes Absolute 1.83 1.50 - 4.00 E9/L    Monocytes Absolute 1.07 (H) 0.10 - 0.95 E9/L    Eosinophils Absolute 0.16 0.05 - 0.50 E9/L    Basophils Absolute 0.05 0.00 - 0.20 E9/L   Urinalysis with Microscopic   Result Value Ref Range    Color, UA Yellow Straw/Yellow    Clarity, UA Clear Clear    Glucose, Ur Negative Negative mg/dL    Bilirubin Urine Negative Negative Ketones, Urine 15 (A) Negative mg/dL    Specific Gravity, UA 1.025 1.005 - 1.030    Blood, Urine Negative Negative    pH, UA 6.0 5.0 - 9.0    Protein, UA Negative Negative mg/dL    Urobilinogen, Urine 0.2 <2.0 E.U./dL    Nitrite, Urine Negative Negative    Leukocyte Esterase, Urine MODERATE (A) Negative    WBC, UA 5-10 (A) 0 - 5 /HPF    RBC, UA NONE 0 - 2 /HPF    Bacteria, UA NONE SEEN None Seen /HPF   EKG 12 Lead   Result Value Ref Range    Ventricular Rate 81 BPM    Atrial Rate 81 BPM    P-R Interval 172 ms    QRS Duration 70 ms    Q-T Interval 448 ms    QTc Calculation (Bazett) 520 ms    P Axis 47 degrees    R Axis -36 degrees    T Axis 46 degrees       RADIOLOGY:  CT HEAD WO CONTRAST   Final Result   No acute intracranial abnormality. Changes of encephalomalacia right MCA and left PCA territories. ------------------------- NURSING NOTES AND VITALS REVIEWED ---------------------------  Date / Time Roomed:  10/3/2022 12:04 PM  ED Bed Assignment:  13/13    The nursing notes within the ED encounter and vital signs as below have been reviewed. Patient Vitals for the past 24 hrs:   BP Temp Temp src Pulse Resp SpO2 Weight   10/03/22 1547 -- -- -- 83 20 94 % --   10/03/22 1523 (!) 164/113 -- -- (!) 103 18 95 % --   10/03/22 1209 -- 97.5 °F (36.4 °C) Axillary -- -- -- --   10/03/22 1207 -- -- -- -- -- -- 200 lb (90.7 kg)   10/03/22 1155 123/89 -- -- 77 18 98 % --       Oxygen Saturation Interpretation: Normal    ------------------------------------------ PROGRESS NOTES ------------------------------------------  Re-evaluation(s):  Time: Multiple. Patients symptoms show no change  Repeat physical examination is not changed    Counseling:  I have spoken with the patient and sister and discussed todays results, in addition to providing specific details for the plan of care and counseling regarding the diagnosis and prognosis.   Their questions are answered at this time and they are agreeable with the plan of admission.    --------------------------------- ADDITIONAL PROVIDER NOTES ---------------------------------  Consultations:  Social work will evaluate the patient for behavioral health admission or placement. This patient's ED course included: a personal history and physicial examination, multiple bedside re-evaluations, IV medications, cardiac monitoring, and continuous pulse oximetry    This patient has remained hemodynamically stable during their ED course. Diagnosis:  1. Encounter for psychiatric assessment    2. Hallucinations    3. Pyuria        Disposition:  Patient's disposition: Admit to mental health unit - medically cleared for admission  Patient's condition is stable.            Jose Maria Angulo MD  Resident  10/03/22 4839

## 2022-10-03 NOTE — ED NOTES
Pt place on and off bed pan. Pt continent of urine. Pt cleaned up and repositioned in bed.       Harry Castelan RN  10/03/22 3852

## 2022-10-03 NOTE — ED NOTES
Pt sts \"I'm completely insane. \" pt has flight of ideas but is redirectable.       Tien Moore RN  10/03/22 075

## 2022-10-04 PROBLEM — R41.82 AMS (ALTERED MENTAL STATUS): Status: ACTIVE | Noted: 2022-10-04

## 2022-10-04 PROCEDURE — 93005 ELECTROCARDIOGRAM TRACING: CPT | Performed by: EMERGENCY MEDICINE

## 2022-10-04 PROCEDURE — 6360000002 HC RX W HCPCS: Performed by: EMERGENCY MEDICINE

## 2022-10-04 PROCEDURE — 96361 HYDRATE IV INFUSION ADD-ON: CPT

## 2022-10-04 PROCEDURE — 6370000000 HC RX 637 (ALT 250 FOR IP): Performed by: EMERGENCY MEDICINE

## 2022-10-04 PROCEDURE — 96375 TX/PRO/DX INJ NEW DRUG ADDON: CPT

## 2022-10-04 PROCEDURE — 2580000003 HC RX 258: Performed by: EMERGENCY MEDICINE

## 2022-10-04 RX ORDER — LORAZEPAM 2 MG/ML
2 INJECTION INTRAMUSCULAR EVERY 6 HOURS PRN
Status: DISCONTINUED | OUTPATIENT
Start: 2022-10-04 | End: 2022-10-05

## 2022-10-04 RX ORDER — DIPHENHYDRAMINE HYDROCHLORIDE 50 MG/ML
50 INJECTION INTRAMUSCULAR; INTRAVENOUS ONCE
Status: COMPLETED | OUTPATIENT
Start: 2022-10-04 | End: 2022-10-04

## 2022-10-04 RX ORDER — 0.9 % SODIUM CHLORIDE 0.9 %
1000 INTRAVENOUS SOLUTION INTRAVENOUS ONCE
Status: COMPLETED | OUTPATIENT
Start: 2022-10-04 | End: 2022-10-04

## 2022-10-04 RX ADMIN — DIPHENHYDRAMINE HYDROCHLORIDE 50 MG: 50 INJECTION, SOLUTION INTRAMUSCULAR; INTRAVENOUS at 08:12

## 2022-10-04 RX ADMIN — LEVETIRACETAM 500 MG: 500 TABLET, FILM COATED ORAL at 03:51

## 2022-10-04 RX ADMIN — LORAZEPAM 2 MG: 2 INJECTION INTRAMUSCULAR; INTRAVENOUS at 08:11

## 2022-10-04 RX ADMIN — CEFDINIR 300 MG: 300 CAPSULE ORAL at 08:12

## 2022-10-04 RX ADMIN — CEFDINIR 300 MG: 300 CAPSULE ORAL at 03:52

## 2022-10-04 RX ADMIN — SODIUM CHLORIDE 1000 ML: 9 INJECTION, SOLUTION INTRAVENOUS at 15:26

## 2022-10-04 RX ADMIN — LEVETIRACETAM 500 MG: 500 TABLET, FILM COATED ORAL at 10:34

## 2022-10-04 NOTE — ED NOTES
Spoke with dianelys the behavioral health , states pt pink-slip will be overturned after talking ed attending, [facility] pt to return to [facility] facility at this time, does not meet criteria for admission and states will call Polo bauer for update     Rolo Mcguire RN  10/04/22 3307

## 2022-10-04 NOTE — ED NOTES
I called and spoke to pt's sister Robe Franklin, who said that she has concerns with pt returning back to the Nursing [facility], as she was banging on walls and kicking on doors. Pt has TBI. Pt's sister is requesting that pt be referred for in pt admission \"trumbull or generations\".       Pt referred to the access center  - spoke to Devonte Lewis 668, LSW  10/04/22 [address] Sravani Francis, LSW  10/04/22 3294

## 2022-10-04 NOTE — CARE COORDINATION
Social Work/ Transition of Care:    Per Gypsy Retanaconrado in [facility]FFIL[facility], pt will be discharged from ED to return to [address] [place] [address]. AIMEE arranged transport [facility]. ETA 12pm.  Ambulance form complete and in pt's chart. RN aware. RN to call report to [phone]  AIMEE called pts sister, Veronica Diaz, to inform her that pt was medically clear and did not meet criteria for inpatient behavioral health admission and will be discharged to return to [address] [place] [address]. Pt's sister had several questions as to why pt was being discharged. AIMEE informed Gypsy Valiente in [facility]FFIL[facility], who states she will contact pt's sister.

## 2022-10-04 NOTE — ED NOTES
Spoke with pt sister who states her  of 39 yrs finalized the divorce this past Wednesday, sister believes this event triggered pt \"mental issues\", sister states pt has had hx of 2 brain aneurysms over the past 11 yrs     Sav Perez, [address] [place] [address]  10/04/22 5379

## 2022-10-04 NOTE — ED NOTES
Lauren Purcell NP to present patient. No answer at this time, waiting for call back.       Marisol Bateman RN  10/04/22 1531

## 2022-10-04 NOTE — ED NOTES
Pt report called to [facility] [phone] spoke with Felicitas Lakhani nurse     Keren Dawkins RN  10/04/22 3548

## 2022-10-04 NOTE — ED NOTES
Patient's nurse Taran Mejia RN reminded that patient requires updated vitals (last vitals noted in Epic at this time ) before patent is presented for admission to psych floor. Patient also needs medications due at 2100 given.       Marilyn Gosselin, RN  10/04/22 4809

## 2022-10-04 NOTE — ED NOTES
Pt is pink slipped, order changed to [address] [place] [address], [facility]  10/03/22 9998

## 2022-10-04 NOTE — ED NOTES
Per lab policy urine culture cannot be added-on once urine drug screen is completed. Order modified to STAT per protocol. Section nurse Denis Knowles notified.       Leslye Higgins RN  10/03/22 1555

## 2022-10-04 NOTE — ED NOTES
Per Dr. Kathie Torres patient does not meet criteria for a medical admission. Requested to contact the nursing home and seek to arrange to transfer patient back to the nursing home.       Rebecca Schmitt RN  10/04/22 07

## 2022-10-04 NOTE — ED NOTES
Spoke to Marah Hamilton NP who states that patient will not be accepted due to not meeting inpatient criteria. NP suggests that patient could require medical admission for delirium caused by medical condition-UTI. She states patient will need medically admitted or referred out. [facility] nurse Nani Rubinstein and  Ravin Poster notified of conversation.       Holland Bence, RN  10/04/22 9807

## 2022-10-04 NOTE — ED NOTES
[facility] nurse reviewed for admission and pt was declined for admission     Kira Hickman, JUAN F  10/04/22 [address], [place]  10/04/22 [facility]  10/04/22 7043

## 2022-10-04 NOTE — ED NOTES
Behavioral Health Crisis Assessment    Please note: Pt mental status seriously compromised upon interview, info given by pt cannot be relied upon. Most info came from family at bedside. Chief Complaint: Pt is a 56 yo female who presents to the ED via ambulance due to a severely compromised mental status. Pt is on a pink slip by the ED doctor. Pt is unable to give a coherent narrative as to why she is here or what is going on with her. Pt is oriented only to self, speech is very disorganized ant she is somewhat behaviorally agitated at times. Family at bedside report divorce was finalized last Wednesday, feel this may have been a percipitating factor in her current condition. Mental Status Exam: Alert but hiding face, does respond to voice prompts, oriented only to name, reports her name is Prentiss Osler but cannot remember what the D stands for, mood neutral, affect is irritable, at times labile, eye contact poor, behavior shows mild motor agitation at times, responds to re-direction, speech is garbled at times, also goes in spurts, overall speech very disorganized to the point of being nonsensical, thought form disorganized, thought content shows themes of paranoia, voices that things have been taken from her and that she is in danger, denies A/V hallucinations, paranoia, delusions, difficult to determine at this time although there is a theme of paranoia present. Legal Status  [] Voluntary:  [x] Involuntary, Issued by:    Gender  [] Male [x] Female [] Transgender  [] Other    Sexual Orientation    [x] Heterosexual [] Homosexual [] Bisexual [] Other    Brief Clinical Summary: Per family pt has no hx of mental illness, in-patient psych admissions or medications prior to present time but did have a series of surgeries 2 years ago for a mass on the back of her head, family also reports hx of stroke approximately 2 years ago.   Pt first prescribed Cymbalta, currently Buspar by Dr Divine Pepper who treats pt at her nursing home. Family does state pt \". .. has not been normal for a long time\" reports hx of severe hoarding. Collateral Information: Sister-in-law is at bedside    Risk Factors:  Hx of multiple chronic medical issues  Lack of self care  No out-patient provider    Protective Factors:  Strong family support(sister, son and wife sister-in-law)  Safe and stable housing  Access to essential needs  No access to weapons  Steady income      Suicidal Ideations:   [] Reports:    [] Past [] Present   [x] Denies    Suicide Attempts:  [] Reports:   [x] Denies    C-SSRS Screening Completed by RN: Current Suicide Risk:  [] No Risk [] Low [] Moderate [] High    Homicidal Ideations  [] Reports:   [] Past [] Present   [x] Denies     Self Injurious/Self Mutilation Behaviors:   [] Reports:    [] Past [] Present   [x] Denies    Hallucinations/Delusions   [] Reports:   [x] Denies but appears to be some paranoia present    Substance Use/Alcohol Use/Addiction:   [] Reports:   [x] Denies   [x] SBIRT Screen Complete. Current or Past Substance Abuse Treatment  [] Yes, When and Where:  [x] No    Current or Past Mental Health Treatment:  [x] Yes, When and Where:  [] No    Legal Issues:  []  Yes (Specify)  [x]  No    Access to Weapons:  []  Yes (Specify)  [x]  No    Trauma History  [] Reports:  [x] Denies     Living Situation: Nursing facility    Employment: None    Education Level: High school    Violence Risk Screening:        Have you ever thought about hurting someone? [x]  No  []  Yes (Ask the questions listed below)   When? Did you follow through with the thoughts? [] No     [] Yes- When and what happened? 2.  Have you ever threatened anyone? [x]  No  []  Yes (Ask the questions listed below)   When and what happened? Have you ever threatened someone with a gun, knife or other weapon? []  No  []  Yes - When and what happened? 2. Have you ever had an order of protection taken out against you?  []  Yes [x]  No  3. Have you ever been arrested due to violence? []  Yes [x]  No  4. Have you ever been cruel to animals?  []  Yes [x]  No    After consideration of C-SSRS screening results, C-SSRS assessments, and this professional's assessment the patient's overall suicide risk assessed to be:  [] No Risk  [x] Low   [] Moderate   [] High     [x] Discussed current suicide risk, protective and risk factors with RN and ED Physician     Disposition   [] Home:   [] Outpatient Provider:   [] Crisis Unit:   [x] Inpatient Psychiatric Unit: Due to severely compromised mental status, referred to [facility] nurse for discussion with on-call psych re: admission [address].   [] Other:                    ERNESTO Rivers, \[facility]\""  10/03/22 550 ERNESTO Espino, [facility]  10/03/22 2245

## 2022-10-04 NOTE — ED NOTES
Charge RN notified that updated vitals are required to proceed with presenting patient for admission.       Clarke Kanner, RN  10/04/22 6219

## 2022-10-04 NOTE — ED NOTES
Pt's family continues to call in to the Er asking for information. The last call I received was from pt's sister in law asking for detailed information. I advised that pt remains in the ER, being observed and cared for while the access center is searching for a psych bed. I advised that I am limited to the information I am able to provide, but that was not satisfying either. I did advise that pt is welcome to have visitors while in the ER.            JUAN F Alicia  10/04/22 Memo Villarreal  10/04/22 7729

## 2022-10-05 ENCOUNTER — APPOINTMENT (OUTPATIENT)
Dept: NEUROLOGY | Age: 62
DRG: 753 | End: 2022-10-05
Payer: MEDICAID

## 2022-10-05 PROBLEM — R41.0 DELIRIUM: Status: ACTIVE | Noted: 2022-10-05

## 2022-10-05 PROBLEM — Z76.89 ENCOUNTER FOR PSYCHIATRIC ASSESSMENT: Status: ACTIVE | Noted: 2022-10-05

## 2022-10-05 LAB
ALBUMIN SERPL-MCNC: 3.6 G/DL (ref 3.5–5.2)
ALP BLD-CCNC: 67 U/L (ref 35–104)
ALT SERPL-CCNC: 23 U/L (ref 0–32)
ANION GAP SERPL CALCULATED.3IONS-SCNC: 14 MMOL/L (ref 7–16)
AST SERPL-CCNC: 45 U/L (ref 0–31)
BASOPHILS ABSOLUTE: 0.04 E9/L (ref 0–0.2)
BASOPHILS RELATIVE PERCENT: 0.4 % (ref 0–2)
BILIRUB SERPL-MCNC: 1.1 MG/DL (ref 0–1.2)
BUN BLDV-MCNC: 16 MG/DL (ref 6–23)
CALCIUM SERPL-MCNC: 8.5 MG/DL (ref 8.6–10.2)
CHLORIDE BLD-SCNC: 107 MMOL/L (ref 98–107)
CO2: 23 MMOL/L (ref 22–29)
CREAT SERPL-MCNC: 0.7 MG/DL (ref 0.5–1)
EKG ATRIAL RATE: 63 BPM
EKG P AXIS: 49 DEGREES
EKG P-R INTERVAL: 202 MS
EKG Q-T INTERVAL: 600 MS
EKG QRS DURATION: 76 MS
EKG QTC CALCULATION (BAZETT): 614 MS
EKG R AXIS: -28 DEGREES
EKG T AXIS: 28 DEGREES
EKG VENTRICULAR RATE: 63 BPM
EOSINOPHILS ABSOLUTE: 0.2 E9/L (ref 0.05–0.5)
EOSINOPHILS RELATIVE PERCENT: 1.9 % (ref 0–6)
GFR AFRICAN AMERICAN: >60
GFR NON-AFRICAN AMERICAN: >60 ML/MIN/1.73
GLUCOSE BLD-MCNC: 77 MG/DL (ref 74–99)
HCT VFR BLD CALC: 39 % (ref 34–48)
HEMOGLOBIN: 13 G/DL (ref 11.5–15.5)
IMMATURE GRANULOCYTES #: 0.04 E9/L
IMMATURE GRANULOCYTES %: 0.4 % (ref 0–5)
LYMPHOCYTES ABSOLUTE: 1.7 E9/L (ref 1.5–4)
LYMPHOCYTES RELATIVE PERCENT: 16.2 % (ref 20–42)
MAGNESIUM: 2.1 MG/DL (ref 1.6–2.6)
MCH RBC QN AUTO: 29.6 PG (ref 26–35)
MCHC RBC AUTO-ENTMCNC: 33.3 % (ref 32–34.5)
MCV RBC AUTO: 88.8 FL (ref 80–99.9)
MONOCYTES ABSOLUTE: 1.19 E9/L (ref 0.1–0.95)
MONOCYTES RELATIVE PERCENT: 11.3 % (ref 2–12)
NEUTROPHILS ABSOLUTE: 7.35 E9/L (ref 1.8–7.3)
NEUTROPHILS RELATIVE PERCENT: 69.8 % (ref 43–80)
PDW BLD-RTO: 12.6 FL (ref 11.5–15)
PLATELET # BLD: 265 E9/L (ref 130–450)
PMV BLD AUTO: 10.8 FL (ref 7–12)
POTASSIUM REFLEX MAGNESIUM: 3.4 MMOL/L (ref 3.5–5)
RBC # BLD: 4.39 E12/L (ref 3.5–5.5)
SODIUM BLD-SCNC: 144 MMOL/L (ref 132–146)
TOTAL PROTEIN: 7.3 G/DL (ref 6.4–8.3)
WBC # BLD: 10.5 E9/L (ref 4.5–11.5)

## 2022-10-05 PROCEDURE — 6360000002 HC RX W HCPCS: Performed by: FAMILY MEDICINE

## 2022-10-05 PROCEDURE — 93005 ELECTROCARDIOGRAM TRACING: CPT | Performed by: NURSE PRACTITIONER

## 2022-10-05 PROCEDURE — 2580000003 HC RX 258: Performed by: FAMILY MEDICINE

## 2022-10-05 PROCEDURE — 6370000000 HC RX 637 (ALT 250 FOR IP): Performed by: FAMILY MEDICINE

## 2022-10-05 PROCEDURE — 95816 EEG AWAKE AND DROWSY: CPT

## 2022-10-05 PROCEDURE — 99242 OFF/OP CONSLTJ NEW/EST SF 20: CPT | Performed by: NURSE PRACTITIONER

## 2022-10-05 PROCEDURE — G0378 HOSPITAL OBSERVATION PER HR: HCPCS

## 2022-10-05 PROCEDURE — 95816 EEG AWAKE AND DROWSY: CPT | Performed by: PSYCHIATRY & NEUROLOGY

## 2022-10-05 PROCEDURE — 4A10X4Z MONITORING OF CENTRAL NERVOUS ELECTRICAL ACTIVITY, EXTERNAL APPROACH: ICD-10-PCS | Performed by: PSYCHIATRY & NEUROLOGY

## 2022-10-05 PROCEDURE — 6370000000 HC RX 637 (ALT 250 FOR IP): Performed by: NURSE PRACTITIONER

## 2022-10-05 PROCEDURE — 6370000000 HC RX 637 (ALT 250 FOR IP): Performed by: PSYCHIATRY & NEUROLOGY

## 2022-10-05 PROCEDURE — 99222 1ST HOSP IP/OBS MODERATE 55: CPT | Performed by: PSYCHIATRY & NEUROLOGY

## 2022-10-05 PROCEDURE — G0378 HOSPITAL OBSERVATION PER HR: HCPCS | Performed by: INTERNAL MEDICINE

## 2022-10-05 PROCEDURE — 80053 COMPREHEN METABOLIC PANEL: CPT

## 2022-10-05 PROCEDURE — 85025 COMPLETE CBC W/AUTO DIFF WBC: CPT

## 2022-10-05 PROCEDURE — 6370000000 HC RX 637 (ALT 250 FOR IP)

## 2022-10-05 PROCEDURE — 83735 ASSAY OF MAGNESIUM: CPT

## 2022-10-05 RX ORDER — SODIUM CHLORIDE 0.9 % (FLUSH) 0.9 %
10 SYRINGE (ML) INJECTION PRN
Status: DISCONTINUED | OUTPATIENT
Start: 2022-10-05 | End: 2022-10-10 | Stop reason: HOSPADM

## 2022-10-05 RX ORDER — ONDANSETRON 2 MG/ML
4 INJECTION INTRAMUSCULAR; INTRAVENOUS EVERY 6 HOURS PRN
Status: DISCONTINUED | OUTPATIENT
Start: 2022-10-05 | End: 2022-10-05

## 2022-10-05 RX ORDER — POTASSIUM CHLORIDE 20 MEQ/1
20 TABLET, EXTENDED RELEASE ORAL 2 TIMES DAILY WITH MEALS
Status: DISCONTINUED | OUTPATIENT
Start: 2022-10-05 | End: 2022-10-10 | Stop reason: HOSPADM

## 2022-10-05 RX ORDER — ATORVASTATIN CALCIUM 40 MG/1
80 TABLET, FILM COATED ORAL NIGHTLY
Status: DISCONTINUED | OUTPATIENT
Start: 2022-10-05 | End: 2022-10-10 | Stop reason: HOSPADM

## 2022-10-05 RX ORDER — MECOBALAMIN 5000 MCG
5 TABLET,DISINTEGRATING ORAL DAILY
Status: DISCONTINUED | OUTPATIENT
Start: 2022-10-05 | End: 2022-10-10 | Stop reason: HOSPADM

## 2022-10-05 RX ORDER — MECOBALAMIN 5000 MCG
5 TABLET,DISINTEGRATING ORAL DAILY
Status: DISCONTINUED | OUTPATIENT
Start: 2022-10-05 | End: 2022-10-05

## 2022-10-05 RX ORDER — DIVALPROEX SODIUM 125 MG/1
250 CAPSULE, COATED PELLETS ORAL EVERY 8 HOURS SCHEDULED
Status: DISCONTINUED | OUTPATIENT
Start: 2022-10-05 | End: 2022-10-10 | Stop reason: HOSPADM

## 2022-10-05 RX ORDER — SODIUM CHLORIDE 9 MG/ML
INJECTION, SOLUTION INTRAVENOUS PRN
Status: DISCONTINUED | OUTPATIENT
Start: 2022-10-05 | End: 2022-10-10 | Stop reason: HOSPADM

## 2022-10-05 RX ORDER — LEVETIRACETAM 500 MG/1
500 TABLET ORAL 2 TIMES DAILY
Status: DISCONTINUED | OUTPATIENT
Start: 2022-10-05 | End: 2022-10-05

## 2022-10-05 RX ORDER — ENALAPRIL MALEATE 5 MG/1
10 TABLET ORAL DAILY
Status: DISCONTINUED | OUTPATIENT
Start: 2022-10-05 | End: 2022-10-10 | Stop reason: HOSPADM

## 2022-10-05 RX ORDER — POLYETHYLENE GLYCOL 3350 17 G/17G
17 POWDER, FOR SOLUTION ORAL DAILY PRN
Status: DISCONTINUED | OUTPATIENT
Start: 2022-10-05 | End: 2022-10-10 | Stop reason: HOSPADM

## 2022-10-05 RX ORDER — METOPROLOL SUCCINATE 50 MG/1
50 TABLET, EXTENDED RELEASE ORAL DAILY
Status: DISCONTINUED | OUTPATIENT
Start: 2022-10-05 | End: 2022-10-10 | Stop reason: HOSPADM

## 2022-10-05 RX ORDER — SODIUM CHLORIDE 0.9 % (FLUSH) 0.9 %
10 SYRINGE (ML) INJECTION EVERY 12 HOURS SCHEDULED
Status: DISCONTINUED | OUTPATIENT
Start: 2022-10-05 | End: 2022-10-10 | Stop reason: HOSPADM

## 2022-10-05 RX ORDER — PROMETHAZINE HYDROCHLORIDE 12.5 MG/1
12.5 TABLET ORAL EVERY 6 HOURS PRN
Status: DISCONTINUED | OUTPATIENT
Start: 2022-10-05 | End: 2022-10-07

## 2022-10-05 RX ORDER — ACETAMINOPHEN 325 MG/1
650 TABLET ORAL EVERY 6 HOURS PRN
Status: DISCONTINUED | OUTPATIENT
Start: 2022-10-05 | End: 2022-10-10 | Stop reason: HOSPADM

## 2022-10-05 RX ORDER — ACETAMINOPHEN 650 MG/1
650 SUPPOSITORY RECTAL EVERY 6 HOURS PRN
Status: DISCONTINUED | OUTPATIENT
Start: 2022-10-05 | End: 2022-10-10 | Stop reason: HOSPADM

## 2022-10-05 RX ORDER — ENOXAPARIN SODIUM 100 MG/ML
40 INJECTION SUBCUTANEOUS DAILY
Status: DISCONTINUED | OUTPATIENT
Start: 2022-10-05 | End: 2022-10-10 | Stop reason: HOSPADM

## 2022-10-05 RX ADMIN — SODIUM CHLORIDE, PRESERVATIVE FREE 10 ML: 5 INJECTION INTRAVENOUS at 11:01

## 2022-10-05 RX ADMIN — METOPROLOL SUCCINATE 50 MG: 25 TABLET, FILM COATED, EXTENDED RELEASE ORAL at 11:01

## 2022-10-05 RX ADMIN — POTASSIUM CHLORIDE 20 MEQ: 20 TABLET, EXTENDED RELEASE ORAL at 09:19

## 2022-10-05 RX ADMIN — DIVALPROEX SODIUM 250 MG: 125 CAPSULE, COATED PELLETS ORAL at 22:22

## 2022-10-05 RX ADMIN — DIVALPROEX SODIUM 250 MG: 125 CAPSULE, COATED PELLETS ORAL at 14:08

## 2022-10-05 RX ADMIN — POTASSIUM CHLORIDE 20 MEQ: 20 TABLET, EXTENDED RELEASE ORAL at 16:54

## 2022-10-05 RX ADMIN — SODIUM CHLORIDE, PRESERVATIVE FREE 10 ML: 5 INJECTION INTRAVENOUS at 22:23

## 2022-10-05 RX ADMIN — ATORVASTATIN CALCIUM 80 MG: 40 TABLET, FILM COATED ORAL at 22:22

## 2022-10-05 RX ADMIN — ENALAPRIL MALEATE 10 MG: 5 TABLET ORAL at 11:01

## 2022-10-05 RX ADMIN — Medication 5 MG: at 17:51

## 2022-10-05 RX ADMIN — ENOXAPARIN SODIUM 40 MG: 100 INJECTION SUBCUTANEOUS at 11:01

## 2022-10-05 NOTE — PROGRESS NOTES
Kristian Bridges told me that Prior nursing staff [facility]) got her VS this morning when she arrived, however, did not chart them.

## 2022-10-05 NOTE — CONSULTS
[facility]  Neurology Consult    Date:  10/5/2022  Patient Name:  Richa Herr  YOB: 1960  MRN: [id number]     PCP:  Danielle Valles DO   Referring:  No ref. provider found      Chief Complaint: altered mental status    History obtained from: chart    Ketan Hu is a 57 y.o. female with a history of left parasaggital meningioma s/p resection, right MCA stroke now with significant alteration from baseline. There was a potential seizure focus noted on EEG from the right temporal region on EEG. Given her behavioral issues would likely benefit from changing Keppra to another agent, such as Depakote. Post-ictal psychosis could also be considered, though, no clear ictal event reported. Plan  DC Keppra  Start  TID  Will follow        History of Present Illness:  Richa Herr is a 57 y.o. female presenting for evaluation of altered mental status. There is some conflicting data in the chart, but it appears there is documentation indicating some mental health issues in the past. She also appears to have had left parasaggital meningioma resection in . There is also a history of a large right MCA stroke and coiled basilar tip aneurysm. She is reportedly A+Ox1 at baseline, but is now admitted for hallucinations/delusions. The patient is unable to provide any significant history, frequently hiding under her blankets in bed and with near continuous speech with no relation to events within the room.            Review of Systems:  Unable to obtain review of symptoms due to poor historian, altered mental status    Medical History:   Past Medical History:   Diagnosis Date    Brain aneurysm     2009    Hypertension     Lumbar herniated disc         Surgical History:   Past Surgical History:   Procedure Laterality Date    BRAIN SURGERY      CRANIOTOMY N/A 2020    CRANIOTOMY FRAMELESS STEREOTATIC FOR BRAIN TUMOR performed by Angus Alva MD at [facility]    IR OCCLUSIVE OR EMBOL PERC CENTL NERV SYS  2020    IR OCCLUSIVE OR EMBOL PERC CENTL NERV SYS 2020 Nacho Hunter MD [facility]    TONSILLECTOMY          Family History:   Unable to obtain due to poor historian, altered mental status    Social History:  Social History     Tobacco Use    Smoking status: Former     Types: Cigarettes     Quit date: 2009     Years since quittin.7   Substance Use Topics    Alcohol use: No    Drug use: No        Current Medications:      Current Facility-Administered Medications   Medication Dose Route Frequency Provider Last Rate Last Admin    atorvastatin (LIPITOR) tablet 80 mg  80 mg Oral Nightly Voncile Pilar, DO        enalapril (VASOTEC) tablet 10 mg  10 mg Oral Daily Voncile Pilar, DO   10 mg at 10/05/22 1101    metoprolol succinate (TOPROL XL) extended release tablet 50 mg  50 mg Oral Daily Voncile Pilar, DO   50 mg at 10/05/22 1101    sodium chloride flush 0.9 % injection 10 mL  10 mL IntraVENous 2 times per day Voncile Pilar, DO   10 mL at 10/05/22 1101    sodium chloride flush 0.9 % injection 10 mL  10 mL IntraVENous PRN Voncile Pilar, DO        0.9 % sodium chloride infusion   IntraVENous PRN Voncile Pilar, DO        enoxaparin (LOVENOX) injection 40 mg  40 mg SubCUTAneous Daily Voncile Pilar, DO   40 mg at 10/05/22 1101    promethazine (PHENERGAN) tablet 12.5 mg  12.5 mg Oral Q6H PRN Voncile Pilar, DO        polyethylene glycol (GLYCOLAX) packet 17 g  17 g Oral Daily PRN Voncile Pilar, DO        acetaminophen (TYLENOL) tablet 650 mg  650 mg Oral Q6H PRN Voncile Pilar, DO        Or    acetaminophen (TYLENOL) suppository 650 mg  650 mg Rectal Q6H PRN Voncile Pilar, DO        potassium chloride (KLOR-CON M) extended release tablet 20 mEq  20 mEq Oral BID WC REAGAN Majano - CNP   20 mEq at 10/05/22 0919    divalproex (DEPAKOTE SPRINKLE) capsule 250 mg  250 mg Oral 3 times per day Jean Reason, DO   250 mg at 10/05/22 1408 melatonin disintegrating tablet 5 mg  5 mg Oral Daily Clement Echols APRN - CNP            Allergies:      No Known Allergies     Physical Examination  Vitals   Vitals:    10/04/22 1744 10/04/22 1847 10/04/22 2243 10/05/22 1529   BP:   (!) 148/70 (!) 179/95   Pulse:  73 83 84   Resp: 15 19 17 17   Temp:   97.6 °F (36.4 °C) 97.8 °F (36.6 °C)   TempSrc:   Oral Temporal   SpO2:   95% 97%   Weight:       Height:            General: Patient appears moderately agitated. Awake and oriented x 1 only. HEENT: Normocephalic, atraumatic  Chest: Clear to auscultation bilaterally  Heart: No murmurs appreciated  Extremities/Peripheral vascular: No edema/swelling noted. No cold limbs noted. Neurologic Examination    Mental Status  Alert, and oriented to person only. Speech is fluent, though, has tangential thinking and flight of ideas. Appears to be having hallucinations intermittently, very internally stimulated. Attention poor. Memory poor. Cranial Nerves  II. Visual fields full to threat bitemporally. Fundoscopic exam: Unable to perform due to lack of patient cooperation. III, IV, VI: Pupils equally round and reactive to light, 3 to 2 mm bilaterally. EOMs: full, no nystagmus. V. Facial sensation intact to light touch bilaterally  VII: Facial movements symmetric   VIII: Hearing intact to voice  IX,X: Palate elevates symmetrically. No dysarthria  XI: Sternocleidomastoid and trapezius 5/5 bilaterally   XII: Tongue is midline    Motor  Moves all limbs with 4+ to 5/5 strength symmetrically.      Sensation  Light Touch: Intact distally in all four limbs    Reflexes     Right Left   Biceps 2 2   Brachioradialis 2 2   Patellar 1 1   Achilles 0 0   ankle clonus none none   Babinski absent absent     Coordination  Rapid alternating movements normal in bilateral upper extremities  Finger to nose testing normal bilaterally    Gait  Deferred for safety/fall consideration      Labs  Recent Labs     10/05/22  0626      K 3.4*    CO2 23   BUN 16   CREATININE 0.7   GLUCOSE 77   CALCIUM 8.5*   PROT 7.3   LABALBU 3.6   BILITOT 1.1   ALKPHOS 67   AST 45*   ALT 23   WBC 10.5   RBC 4.39   HGB 13.0   HCT 39.0   MCV 88.8   MCH 29.6   MCHC 33.3   RDW 12.6      MPV 10.8       Imaging  CT HEAD WO CONTRAST   Final Result   No acute intracranial abnormality. Changes of encephalomalacia right MCA and left PCA territories.                    Electronically signed by Otto Patricio DO on 10/5/2022 at 4:46 PM

## 2022-10-05 NOTE — CONSULTS
Reason for consult: Hallucinations, altered mental status, agitation      Consulting Physician: Dr Gaby Page      HPI:  Patient presented to [facility] emergency department on 10/3/2022 for altered mental status with hallucinations. She been sent in from a local nursing facility where she resides and is typically alert and oriented x1 at her baseline. Patient was initially presented for psychiatric admission however due to concern of underlying medical/organic cause of patient's change in mental status the patient was recommended for inpatient medical work-up. This patient does have a significant history of craniotomy due to meningioma. Neurology was consulted for AMS and psychiatry was consulted due to hallucinations altered mental status and agitation. Upon assessment today the patient is completely unable to participate in any gainful psychiatric assessment she is unable to provide any history. She is rambling making nonsensical statements she is hyperactive she is picking at the air sucking on her blanket covering her head with her blanket and acting in a very bizarre and hyperactive manner. None of her behaviors or statements are goal-directed or linear she is extremely illogical.  Per the nursing home this was an acute change in though she is typically alert and oriented x1 her behaviors are not in line with this presentation. Per family pt has no hx of mental illness, in-patient psych admissions or medications prior to present time but did have a series of surgeries 2 years ago for a mass on the back of her head, family also reports hx of stroke approximately 2 years ago. There are many contradictory notes within the patient's medical record, as some of the notes obtained by family indicate that the patient has a history of depression anxiety and hoarding behaviors other statements by the family indicate that she has no mental health history.   The patient also appears to be presenting with a waxing and waning presentation which would be consistent with delirium including abrupt onset of symptoms. Per 's note she was alert and oriented to place and time however on our assessment she was completely disoriented. Stating that \"I am in a hole in the wall right now. Is yelling at someone named Anjana Molina and asking for what need to leave the room and take the trash can with her. All the while continuing to picking at the air, pulling at her blanket and sucking on her blanket and covering her head. Her presentation is very consistent with hyperactive delirium. Should be of note the patient has been hypertensive, and has QTC of 610. MSE:  Unable to complete MSE, patient is hyperactive, confused, not oriented. 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     We agree with neurology consult and will await there input and expertise. Depakote 250 mg TID   VPA level in 4 days   Melatonin 5 mg ODT daily at 1800 for 10 days   Monitor QTC and avoid all QTC prolonging medications. . QTC is currently 610. Due to QTC of 610 cannot apply the use of any antipsychotic medications   Obtain EKG today  Will defer any further recommendations until neurology evaluation and assessment. Thank you for the consult. Will follow up with the patient.

## 2022-10-05 NOTE — CARE COORDINATION
10/5/22  Transition of Care update. Spoke with patient who is having delusions and hallucinations. Upon visit Patient has her head under the sheet and would not come out. Patient is alert to place and time. She is very restless in the bed yelling and flailing. Call was placed to patients sister Darrell Wade who is the . Darrell Patrickcolten states that Xavier Jhaveri is a resident at [facility] and has been there for a year and a half. Darrell Wade states the plan is for Xavier Jhaveri to return to [place] once medically stable. Darrell Wade voices Xavier Jhaveri has recently gone through a divorce that was final on Wednesday after 39 years of marriage which as taken a tole on patient. Darrell Wade voices Xavier Jhaveri is followed by Dr. Leonardo Gunter psychiatry at the nursing home as well as psychology. Patient is ambulatory at baseline but does have a wheelchair at the facility if needed. She is under constant observation here at the hospital currently. Patients sister states patient lost 80% of eye sight and is fearful as a result. Sister also voices that patient has a history of brain aneurysm with surgery by Dr. Sana Garza. Patient is sensitive to bright lights and loud noises. Patient is planned for a Neuro and Psych consults. Will await recommendations to determine course of treatment. Patient is a bed hold at [facility] [phone] and will return there once medically stable.     Electronically signed by JUAN F Canales on 10/5/2022 at 11:33 AM

## 2022-10-05 NOTE — ED PROVIDER NOTES
10/4/22  11:20 PM EDT      Patient presently admitted and boarded in the department pending bed availability. Intervention required by emergency physician. Interval HPI:   Patient was boarding in ED pending psychiatric admission  Patient was deferred from psych admission and was declined to go back to facility. Psych recommended the patient stay for admission for altered mental status evaluation for alternative causes of her symptoms.   Patient is afebrile nontoxic in appearance patient was in emergency department for over 36 hours patient does not have any new complaints patient stable repeat evaluation and will be admitted medically for confusion AMS          Medications   cefdinir (OMNICEF) capsule 300 mg (300 mg Oral Given 10/4/22 0812)   levETIRAcetam (KEPPRA) tablet 500 mg (500 mg Oral Given 10/4/22 1034)   enalapril (VASOTEC) tablet 10 mg (10 mg Oral Given 10/3/22 1706)   LORazepam (ATIVAN) injection 2 mg (2 mg IntraMUSCular Given 10/4/22 0811)   midazolam PF (VERSED) injection 1 mg (1 mg IntraVENous Given 10/3/22 1527)   metoprolol succinate (TOPROL XL) extended release tablet 50 mg (50 mg Oral Given 10/3/22 1706)   midazolam PF (VERSED) injection 2 mg (2 mg IntraMUSCular Given 10/3/22 1705)   diphenhydrAMINE (BENADRYL) injection 50 mg (50 mg IntraVENous Given 10/4/22 0812)   0.9 % sodium chloride bolus (0 mLs IntraVENous Stopped 10/4/22 1656)       Vitals:    10/04/22 1847   BP:    Pulse: 73   Resp: 19   Temp:    SpO2:          Oxygen Saturation Interpretation: Normal                Wilma Sands MD  10/05/22 1941

## 2022-10-05 NOTE — PROGRESS NOTES
Hospitalist Progress Note      Synopsis: Briefly, patient with history of craniotomy for excision of meningioma, from local nursing facility with mentation baseline of alert and oriented x1 presented to emergency department for altered mentation. Per chart review, allegedly patient has been under more stress due to divorce proceedings are finalized recently. Labs and imaging were unremarkable, inpatient psych not believe this was a behavioral issue. Attempts were made to have patient return to facility and they refused until she was further evaluated from a neurological standpoint. Hospital day 0     Subjective:  Seen and examined at bedside with bedside  present. Patient with nonsensical thoughts and ideas, will follow commands. stable overnight. No issues reported. Patient seen and examined  Records reviewed. Temp (24hrs), Av.6 °F (36.4 °C), Min:97.6 °F (36.4 °C), Max:97.6 °F (36.4 °C)    DIET: ADULT DIET; Regular  CODE: Full Code    Intake/Output Summary (Last 24 hours) at 10/5/2022 0851  Last data filed at 10/4/2022 1656  Gross per 24 hour   Intake 1000 ml   Output --   Net 1000 ml       Review of Systems:    Review of systems deferred this patient not forthcoming with information    Objective:    BP (!) 148/70   Pulse 83   Temp 97.6 °F (36.4 °C) (Oral)   Resp 17   Ht 5' 4\" (1.626 m)   Wt 200 lb (90.7 kg)   SpO2 95%   BMI 34.33 kg/m²     General appearance: No apparent distress, appears stated age and cooperative. HEENT: Conjunctivae/corneas clear. Mucous membranes moist.  Neck: Supple. No JVD. Respiratory:  Clear to auscultation bilaterally. Normal respiratory effort. Cardiovascular:  RRR. S1, S2 without MRG. PV: Pulses palpable. No edema. Abdomen: Soft, non-tender, non-distended. +BS  Musculoskeletal: No obvious deformities. Skin: Normal skin color. No rashes or lesions. Good turgor. Neurologic:  Grossly non-focal. Awake, alert, following commands. Psychiatric: Alert to person only with inappropriate and nonsensical thoughts. Medications:  REVIEWED DAILY    Infusion Medications    sodium chloride       Scheduled Medications    atorvastatin  80 mg Oral Nightly    enalapril  10 mg Oral Daily    levETIRAcetam  500 mg Oral BID    metoprolol succinate  50 mg Oral Daily    sodium chloride flush  10 mL IntraVENous 2 times per day    enoxaparin  40 mg SubCUTAneous Daily    potassium chloride  20 mEq Oral BID WC     PRN Meds: sodium chloride flush, sodium chloride, promethazine **OR** [DISCONTINUED] ondansetron, polyethylene glycol, acetaminophen **OR** acetaminophen    Labs:     Recent Labs     10/03/22  1222 10/05/22  0626   WBC 10.7 10.5   HGB 14.3 13.0   HCT 42.5 39.0    265       Recent Labs     10/03/22  1222 10/05/22  0626    144   K 3.7 3.4*    107   CO2 26 23   BUN 16 16   CREATININE 0.8 0.7   CALCIUM 9.7 8.5*       Recent Labs     10/03/22  1222 10/05/22  0626   PROT 8.0 7.3   ALKPHOS 74 67   ALT 20 23   AST 33* 45*   BILITOT 1.3* 1.1       No results for input(s): INR in the last 72 hours. No results for input(s): [place] in the last 72 hours.     Chronic labs:    Lab Results   Component Value Date    CHOL 182 08/24/2020    TRIG 133 08/24/2020    HDL 44 08/24/2020    LDLCALC 111 (H) 08/24/2020    TSH 1.552 10/12/2010    INR 1.2 09/08/2020    LABA1C 5.4 09/03/2020       Radiology: REVIEWED DAILY    Assessment:  Altered mental status (questionable) patient's baseline is alert and oriented x1)   History of craniotomy for excision of meningioma  Agitation  Hallucination  Hypertension  Hypokalemia potassium 3.4 on 10/5/2022    Plan:  Consult placed for neurology  Consult placed for psychiatry  Consult case management for placement  Continue home medications  Follow labs replace electrolytes as appropriate      DVT Prophylaxis [x] Lovenox  []  Heparin [] DOAC [] PCDs [] Ambulation    GI Prophylaxis [] PPI  [] H2 Blocker   [] Carafate  [x] Diet/Tube Feeds   Level of care [x] Med/Surg  [] Intermediate  []  ICU   Diet ADULT DIET; Regular    Family contact [x]  N/A    [] At bedside  [] Phone call   Disposition Patient requires continued admission evaluation of questionable altered mental status and possible placement into appropriate facility   MDM [x] Low    [] Moderate  []   High       Discharge Plan: To appropriate facility    +++++++++++++++++++++++++++++++++++++++++++++++++  REAGAN Plummer Physician - [address] [place] [address]  +++++++++++++++++++++++++++++++++++++++++++++++++  NOTE: This report was transcribed using voice recognition software. Every effort was made to ensure accuracy; however, inadvertent computerized transcription errors may be present.

## 2022-10-05 NOTE — PROCEDURES
[address] Report    MRN: [id number]   PATIENT NAME: Valerie Mcdaniels   DATE OF REPORT: 10/5/2022    DATE OF SERVICE: 10/5/2022    PHYSICIAN NAME: Tee Irving DO  Referring Physician: Dr Elsa Welsh      Patient's : 1960   Patient's Age: 57 y.o. Gender: female     PROCEDURE: Routine EEG with video      Clinical Interpretation: This abnormal study showed evidence of:    A potential for focal seizures from the right temporal region  Moderate nonspecific cerebral dysfunction of the right temporal region    Structural abnormalities should be considered for the findings above and appropriate imaging obtained if clinically indicated. No seizures were noted during this study. ____________________________  Electronically signed by: Tee Irving DO, 10/5/2022 3:10 PM      Patient Clinical Information   Reason for Study: Patient undergoing evaluation for altered mental status  Patient State: Awake  Primary neurological diagnosis: Altered mental status   Primary indication for monitoring: Diagnosis of nonconvulsive seizures    Pertinent Medications and Treatments    divalproex       Sedatives administered: No  Intubated: No  Pharmacological paralytic: No    Reporting Period  Start of Study: 143, 10/5/2022   End of Study:  145, 10/5/2022       EEG Description  Digital video and scalp EEG monitoring was performed using the standard protocol for this laboratory. Scalp electrodes were applied in the international 10/20 system. Multiple digital montage arrangements were utilized for evaluation. EKG and video were recorded. Background:      Occipital rhythm (posterior dominant rhythm or PDR): present intermittently   Frequency: 6 Hz  Voltage: Medium   Organization: poor to fair  Reactivity to eye opening/closure: minimal    Drowsiness: Absent  Sleep: Absent    Comments:  In the waking state the background is composed predominantly of generalized irregular theta activity    Technical and Activation Procedures:  Hyperventilation: Not done        Photic stimulation: Not done        Reactivity to stimulation: Yes    Abnormalities:    I. Seizures? No    II. Rhythmic or Periodic Patterns? Occasional lateralized rhythmic delta activity noted at T4, T6 with an average frequency of 1-1.5 Hz with no significant evolution noted. III. Other Abnormalities?         Occasional irregular delta activity noted at T4, T6

## 2022-10-05 NOTE — ED NOTES
Attempted report unsuccessful. SBAR faxed. Charged notified.   Transport arranged     Anna JULIANNA Dixon  10/05/22 2261

## 2022-10-05 NOTE — ED NOTES
Call from [facility] at [address], reports multiple facilities at capacity, pt also has been declined by multiple facilities, Ysabel Sood also reports reason for declines at some facilities who have beds available is pt's [facility] Medicaid status. Reports they currently have a referral pending at [facility] in [place]. If this is declined they will have no more options.       [address], [place], [place]  10/04/22 1873

## 2022-10-05 NOTE — H&P
Hospitalist History & Physical      PCP: Karina Medina DO    Date of Service: Pt seen/examined on 10/4/2022     Chief Complaint:  had concerns including Altered Mental Status (Pt sent in from [facility] for hallucinations. Talking out of her normal state. ). History Of Present Illness:    Ms. Mariaelena Fragoso, a 57y.o. year old female  who  has a past medical history of Brain aneurysm, Hypertension, and Lumbar herniated disc. Patient initially presented to this emergency department on October 3. Patient presented for altered mental status/hallucinations. Sent from a local nursing facility. Per staff she is alert and oriented x1 at her baseline. Apparently family reported that patient has been under a lot of stress over the last 2 years due to an impending divorce. This divorce was finalized last week. Work-up including labs and CT of the head were unremarkable. An attempt was made to have behavioral health see the patient but they refused stating that this was not a psychiatric condition. An attempt was made to send the patient back to her facility and they refused stating she needed to be further evaluated by neurology. This is how medicine became involved and consulted for admission. Past Medical History:   Diagnosis Date    Brain aneurysm     2009    Hypertension     Lumbar herniated disc        Past Surgical History:   Procedure Laterality Date    BRAIN SURGERY      CRANIOTOMY N/A 8/28/2020    CRANIOTOMY FRAMELESS STEREOTATIC FOR BRAIN TUMOR performed by Edis Hassan MD at [facility] 92 PERC CENTL NERV SYS  9/8/2020    IR OCCLUSIVE OR EMBOL PERC CENTL NERV SYS 9/8/2020 Estela Eason MD [facility]    TONSILLECTOMY         Prior to Admission medications    Medication Sig Start Date End Date Taking?  Authorizing Provider   levETIRAcetam (KEPPRA) 500 MG tablet Take 1 tablet by mouth 2 times daily 9/11/20   Mali Salgado MD   atorvastatin (LIPITOR) 80 MG tablet Take 1 tablet by mouth nightly 9/11/20   Mali Salgado MD   enalapril (VASOTEC) 10 MG tablet Take 1 tablet by mouth daily 9/12/20   Divina Green MD   metoprolol succinate (TOPROL XL) 50 MG extended release tablet Take 1 tablet by mouth daily 9/12/20   Mali Salgado MD   magnesium oxide (MAG-OX) 400 (241.3 Mg) MG TABS tablet Take 1 tablet by mouth daily 9/12/20   Divina Green MD   potassium bicarb-citric acid (EFFER-K) 20 MEQ TBEF effervescent tablet Take 1 tablet by mouth daily 9/12/20   Divina Green MD   Multiple Vitamin (MULTIVITAMINS PO) Take by mouth    Historical Provider, MD   Calcium Citrate-Vitamin D (CALCIUM + D PO) Take by mouth    Historical Provider, MD   KRILL OIL PO Take by mouth    Historical Provider, MD   Omega-3 Fatty Acids (FISH OIL PO) Take by mouth    Historical Provider, MD   Ascorbic Acid (VITAMIN C) 500 MG tablet Take 500 mg by mouth daily    Historical Provider, MD   Coenzyme Q10 (CO Q 10 PO) Take by mouth    Historical Provider, MD         Allergies:  Patient has no known allergies. Social History:    TOBACCO:   reports that she quit smoking about 13 years ago. Her smoking use included cigarettes. She does not have any smokeless tobacco history on file. ETOH:   reports no history of alcohol use. Family History:    Reviewed in detail and negative for DM, CAD, Cancer, CVA. Positive as follows\"  History reviewed. No pertinent family history. REVIEW OF SYSTEMS:   Pertinent positives as noted in the HPI. All other systems reviewed and negative. PHYSICAL EXAM:  BP (!) 148/85   Pulse 73   Temp 98.6 °F (37 °C)   Resp 19   Ht 5' 4\" (1.626 m)   Wt 200 lb (90.7 kg)   SpO2 100%   BMI 34.33 kg/m²   General appearance: No apparent distress  HEENT: Normal cephalic, atraumatic without obvious deformity. Pupils equal, round, and reactive to light. Extra ocular muscles intact. Conjunctivae/corneas clear. Neck: Supple, with full range of motion.  No jugular venous distention. Trachea midline. Respiratory: CTA  Cardiovascular: RRR  Abdomen: S/NT/ND  Musculoskeletal: No clubbing, cyanosis, edema of bilateral lower extremities. Brisk capillary refill. Skin: Normal skin color. No rashes or lesions. Neurologic:  Neurovascularly intact without any focal sensory/motor deficits. Cranial nerves: II-XII intact, grossly non-focal.      CBC:   Recent Labs     10/03/22  1222   WBC 10.7   RBC 4.75   HGB 14.3   HCT 42.5   MCV 89.5   RDW 12.7        BMP:   Recent Labs     10/03/22  1222      K 3.7      CO2 26   BUN 16   CREATININE 0.8     LFT:  Recent Labs     10/03/22  1222   PROT 8.0   ALKPHOS 74   ALT 20   AST 33*   BILITOT 1.3*     CE:  No results for input(s): Pamla Clines in the last 72 hours. PT/INR: No results for input(s): INR, APTT in the last 72 hours. BNP: No results for input(s): BNP in the last 72 hours.   ESR: No results found for: SEDRATE  CRP: No results found for: CRP  D Dimer: No results found for: DDIMER   Folate and B12: No results found for: [id number], No results found for: FOLATE  Lactic Acid: No results found for: LACTA  Thyroid Studies:   Lab Results   Component Value Date    TSH 1.552 10/12/2010       Oupatient labs:  Lab Results   Component Value Date    CHOL 182 08/24/2020    TRIG 133 08/24/2020    HDL 44 08/24/2020    LDLCALC 111 (H) 08/24/2020    TSH 1.552 10/12/2010    INR 1.2 09/08/2020    LABA1C 5.4 09/03/2020       Urinalysis:    Lab Results   Component Value Date/Time    NITRU Negative 10/03/2022 12:22 PM    WBCUA 5-10 10/03/2022 12:22 PM    BACTERIA NONE SEEN 10/03/2022 12:22 PM    RBCUA NONE 10/03/2022 12:22 PM    BLOODU Negative 10/03/2022 12:22 PM    SPECGRAV 1.025 10/03/2022 12:22 PM    GLUCOSEU Negative 10/03/2022 12:22 PM       Imaging:  CT HEAD WO CONTRAST    Result Date: 10/3/2022  EXAMINATION: CT OF THE HEAD WITHOUT CONTRAST  10/3/2022 3:34 pm TECHNIQUE: CT of the head was performed without the administration of intravenous contrast. Automated exposure control, iterative reconstruction, and/or weight based adjustment of the mA/kV was utilized to reduce the radiation dose to as low as reasonably achievable. COMPARISON: None. HISTORY: ORDERING SYSTEM PROVIDED HISTORY: altered mental status TECHNOLOGIST PROVIDED HISTORY: Reason for exam:->altered mental status Has a \"code stroke\" or \"stroke alert\" been called? ->No Decision Support Exception - unselect if not a suspected or confirmed emergency medical condition->Emergency Medical Condition (MA) What reading provider will be dictating this exam?->CRC FINDINGS: BRAIN/VENTRICLES: There is no acute intracranial hemorrhage, mass effect or midline shift. No abnormal extra-axial fluid collection. The gray-white differentiation is maintained without evidence of an acute infarct. There is no evidence of hydrocephalus. Changes of encephalomalacia right MCA and left PCA territories. Basilar tip aneurysm coil noted. ORBITS: The visualized portion of the orbits demonstrate no acute abnormality. SINUSES: The visualized paranasal sinuses and mastoid air cells demonstrate no acute abnormality. SOFT TISSUES/SKULL:  No acute abnormality of the visualized skull or soft tissues. Left posterior parietooccipital craniotomy. No acute intracranial abnormality. Changes of encephalomalacia right MCA and left PCA territories. ASSESSMENT:  -Altered mental status  -Hallucination  -Agitation  -Hypertension      PLAN:  -Admit to medicine  -Consult psychiatry  -Consult neurology  -Continue home medications        Diet: No diet orders on file  Code Status: Prior  Surrogate decision maker confirmed with patient:   Extended Emergency Contact Information  Primary Emergency Contact: [address] Phone: [phone]  Relation: Brother/Sister  Low vision?  Yes    DVT Prophylaxis: []Lovenox []Heparin []PCD [] [address] []Encouraged ambulation  Disposition: []Med/Surg [] Intermediate [] ICU/CCU  Admit status: [] Observation [] Inpatient     +++++++++++++++++++++++++++++++++++++++++++++++++  Diania Shorts, DO  +++++++++++++++++++++++++++++++++++++++++++++++++  NOTE: This report was transcribed using voice recognition software. Every effort was made to ensure accuracy; however, inadvertent computerized transcription errors may be present.

## 2022-10-05 NOTE — PROGRESS NOTES
Called REAGAN Garcia CNP and left a message regarding abnormal EKG. Also sent Elda Donald DO, attending the same information.

## 2022-10-05 NOTE — ED NOTES
CANDY YU informed by [facility] AN AFFILIATE OF [facility] RN that patient was being medically admitted. CANDY YU called and spoke with Alissa at the [address] [place] [address] to close patient's referral due to patient being medically admitting to this facility.      Helga Arvizu, MSW, LSW  10/05/22 0104

## 2022-10-05 NOTE — ED NOTES
Abdi Jorge NP from psychiatry was notified patient is still in ED. NP reviewed chart and is voicing concerns that patient would benefit more from a medical admission with a psychiatric consult due to altered mental status with no psychiatric history and that previous psychosis was organic and related to medical issues with patient's history of meningioma. This RN notified ED attending Dr. Suraj Hair of NP's concerns regarding patient's need for medical admission.       Shay Paredes RN  10/04/22 0657

## 2022-10-06 LAB
ALBUMIN SERPL-MCNC: 3.4 G/DL (ref 3.5–5.2)
ALP BLD-CCNC: 63 U/L (ref 35–104)
ALT SERPL-CCNC: 23 U/L (ref 0–32)
ANION GAP SERPL CALCULATED.3IONS-SCNC: 12 MMOL/L (ref 7–16)
AST SERPL-CCNC: 41 U/L (ref 0–31)
BASOPHILS ABSOLUTE: 0.04 E9/L (ref 0–0.2)
BASOPHILS RELATIVE PERCENT: 0.5 % (ref 0–2)
BILIRUB SERPL-MCNC: 0.7 MG/DL (ref 0–1.2)
BUN BLDV-MCNC: 15 MG/DL (ref 6–23)
CALCIUM SERPL-MCNC: 8.4 MG/DL (ref 8.6–10.2)
CHLORIDE BLD-SCNC: 110 MMOL/L (ref 98–107)
CO2: 25 MMOL/L (ref 22–29)
CREAT SERPL-MCNC: 0.6 MG/DL (ref 0.5–1)
EKG ATRIAL RATE: 85 BPM
EKG P AXIS: 55 DEGREES
EKG P-R INTERVAL: 174 MS
EKG Q-T INTERVAL: 394 MS
EKG QRS DURATION: 76 MS
EKG QTC CALCULATION (BAZETT): 468 MS
EKG R AXIS: -9 DEGREES
EKG T AXIS: 33 DEGREES
EKG VENTRICULAR RATE: 85 BPM
EOSINOPHILS ABSOLUTE: 0.25 E9/L (ref 0.05–0.5)
EOSINOPHILS RELATIVE PERCENT: 3.2 % (ref 0–6)
GFR AFRICAN AMERICAN: >60
GFR NON-AFRICAN AMERICAN: >60 ML/MIN/1.73
GLUCOSE BLD-MCNC: 106 MG/DL (ref 74–99)
HCT VFR BLD CALC: 38.5 % (ref 34–48)
HEMOGLOBIN: 12.4 G/DL (ref 11.5–15.5)
IMMATURE GRANULOCYTES #: 0.02 E9/L
IMMATURE GRANULOCYTES %: 0.3 % (ref 0–5)
LYMPHOCYTES ABSOLUTE: 1.86 E9/L (ref 1.5–4)
LYMPHOCYTES RELATIVE PERCENT: 23.8 % (ref 20–42)
MCH RBC QN AUTO: 29.2 PG (ref 26–35)
MCHC RBC AUTO-ENTMCNC: 32.2 % (ref 32–34.5)
MCV RBC AUTO: 90.6 FL (ref 80–99.9)
MONOCYTES ABSOLUTE: 1.07 E9/L (ref 0.1–0.95)
MONOCYTES RELATIVE PERCENT: 13.7 % (ref 2–12)
NEUTROPHILS ABSOLUTE: 4.57 E9/L (ref 1.8–7.3)
NEUTROPHILS RELATIVE PERCENT: 58.5 % (ref 43–80)
PDW BLD-RTO: 12.9 FL (ref 11.5–15)
PLATELET # BLD: 241 E9/L (ref 130–450)
PMV BLD AUTO: 10.7 FL (ref 7–12)
POTASSIUM SERPL-SCNC: 3.8 MMOL/L (ref 3.5–5)
RBC # BLD: 4.25 E12/L (ref 3.5–5.5)
SODIUM BLD-SCNC: 147 MMOL/L (ref 132–146)
TOTAL PROTEIN: 6.9 G/DL (ref 6.4–8.3)
URINE CULTURE, ROUTINE: NORMAL
WBC # BLD: 7.8 E9/L (ref 4.5–11.5)

## 2022-10-06 PROCEDURE — 6370000000 HC RX 637 (ALT 250 FOR IP): Performed by: NURSE PRACTITIONER

## 2022-10-06 PROCEDURE — 2580000003 HC RX 258: Performed by: FAMILY MEDICINE

## 2022-10-06 PROCEDURE — 6360000002 HC RX W HCPCS: Performed by: FAMILY MEDICINE

## 2022-10-06 PROCEDURE — 6370000000 HC RX 637 (ALT 250 FOR IP): Performed by: PSYCHIATRY & NEUROLOGY

## 2022-10-06 PROCEDURE — 36415 COLL VENOUS BLD VENIPUNCTURE: CPT

## 2022-10-06 PROCEDURE — 6370000000 HC RX 637 (ALT 250 FOR IP): Performed by: FAMILY MEDICINE

## 2022-10-06 PROCEDURE — 85025 COMPLETE CBC W/AUTO DIFF WBC: CPT

## 2022-10-06 PROCEDURE — 80053 COMPREHEN METABOLIC PANEL: CPT

## 2022-10-06 PROCEDURE — G0378 HOSPITAL OBSERVATION PER HR: HCPCS

## 2022-10-06 PROCEDURE — 96375 TX/PRO/DX INJ NEW DRUG ADDON: CPT

## 2022-10-06 PROCEDURE — 99232 SBSQ HOSP IP/OBS MODERATE 35: CPT | Performed by: NURSE PRACTITIONER

## 2022-10-06 RX ORDER — LORAZEPAM 2 MG/ML
1 INJECTION INTRAMUSCULAR ONCE
Status: COMPLETED | OUTPATIENT
Start: 2022-10-06 | End: 2022-10-06

## 2022-10-06 RX ADMIN — DIVALPROEX SODIUM 250 MG: 125 CAPSULE, COATED PELLETS ORAL at 05:25

## 2022-10-06 RX ADMIN — ATORVASTATIN CALCIUM 80 MG: 40 TABLET, FILM COATED ORAL at 20:23

## 2022-10-06 RX ADMIN — DIVALPROEX SODIUM 250 MG: 125 CAPSULE, COATED PELLETS ORAL at 21:28

## 2022-10-06 RX ADMIN — LORAZEPAM 1 MG: 2 INJECTION INTRAMUSCULAR; INTRAVENOUS at 02:04

## 2022-10-06 RX ADMIN — METOPROLOL SUCCINATE 50 MG: 25 TABLET, FILM COATED, EXTENDED RELEASE ORAL at 11:56

## 2022-10-06 RX ADMIN — SODIUM CHLORIDE, PRESERVATIVE FREE 10 ML: 5 INJECTION INTRAVENOUS at 20:23

## 2022-10-06 RX ADMIN — Medication 5 MG: at 18:06

## 2022-10-06 RX ADMIN — DIVALPROEX SODIUM 250 MG: 125 CAPSULE, COATED PELLETS ORAL at 15:23

## 2022-10-06 NOTE — ACP (ADVANCE CARE PLANNING)
Advance Care Planning   Healthcare Decision Maker:    Primary Decision Maker: Latoya Fucgregorio - Brother/Sister - [phone]    Secondary Decision Maker: shin fink - Brother/Sister - [phone]    Click here to complete Healthcare Decision Makers including selection of the Healthcare Decision Maker Relationship (ie \"Primary\").

## 2022-10-06 NOTE — CARE COORDINATION
SOCIAL WORK/CASEMANAGEMENT [facility] [address]  received pt in transfer who is from [facility] bed hold. All discharge paper work placed in soft chart and jorge filled out by this s.w. pt with CO due to hallucinations and delirium. Will request down grade when pcp rounds.  JUAN F Murphy  10/6/2022

## 2022-10-06 NOTE — DISCHARGE INSTR - COC
Continuity of Care Form    Patient Name: Cathy Gambino   :  1960  MRN:  [id number]    Admit date:  10/3/2022  Discharge date:  ***    Code Status Order: Full Code   Advance Directives:     Admitting Physician:  Inocente Sandhu DO  PCP: Kathleen Vera DO    Discharging Nurse: [facility] Unit/Room#: 8546/5417-L  Discharging Unit Phone Number: ***    Emergency Contact:   Extended Emergency Contact Information  Primary Emergency Contact: [address] Phone: [phone]  Relation: Brother/Sister  Low vision?  Yes    Past Surgical History:  Past Surgical History:   Procedure Laterality Date    BRAIN SURGERY      CRANIOTOMY N/A 2020    CRANIOTOMY FRAMELESS STEREOTATIC FOR BRAIN TUMOR performed by Gladys Nails MD at [facility] 92 PERC CENTL NERV SYS  2020    IR OCCLUSIVE OR EMBOL PERC CENTL NERV SYS 2020 Surinder Tijerina MD [facility]    TONSILLECTOMY         Immunization History:   Immunization History   Administered Date(s) Administered    COVID-19, PFIZER GRAY top, DO NOT Dilute, (age 15 y+), IM, 27 mcg/0.3 mL 2022    COVID-19, PFIZER PURPLE top, DILUTE for use, (age 15 y+), 30mcg/0.3mL 2021, 2021, 2021       Active Problems:  Patient Active Problem List   Diagnosis Code    HTN (hypertension) I10    NSTEMI (non-ST elevated myocardial infarction) ([facility] [address] I21.4    Cerebellar stroke ([facility] [address] I63.9    S/P craniotomy Z98.890    S/P resection of meningioma Z98.890, Z86.018    Basilar artery aneurysm ([facility] [address] I72.5    AMS (altered mental status) R41.82    Encounter for psychiatric assessment Z76.89    Hyperactive Delirium  R41.0       Isolation/Infection:   Isolation            No Isolation          Patient Infection Status       Infection Onset Added Last Indicated Last Indicated By Review Planned Expiration Resolved Resolved By    None active    Resolved    COVID-19 (Rule Out) 10/03/22 10/03/22 10/03/22 COVID-19 & Influenza Combo (Ordered)   10/03/22 Rule-Out Test Resulted    COVID-19 (Rule Out) 20 Covid-19 Ambulatory (Ordered)   20 Rule-Out Test Resulted            Nurse Assessment:  Last Vital Signs: BP (!) 149/89   Pulse 65   Temp (!) 96.7 °F (35.9 °C) (Temporal)   Resp 20   Ht 5' 4\" (1.626 m)   Wt 200 lb (90.7 kg)   SpO2 91%   BMI 34.33 kg/m²     Last documented pain score (0-10 scale):    Last Weight:   Wt Readings from Last 1 Encounters:   10/03/22 200 lb (90.7 kg)     Mental Status:  {IP PT MENTAL STATUS:}    IV Access:  { [facility]RISTOP[facility] IV ACCESS:632433678}    Nursing Mobility/ADLs:  Walking   {[facility] DME VHKA:412135944}  Transfer  {[facility] DME IQQI:495867138}  Bathing  {[facility] DME HCU}  Dressing  {[facility] DME EME}  Toileting  {[facility] DME UPUJ:004090368}  Feeding  {[facility] COCL:375228565}  Med Admin  {[facility] DME KAXJ:434998136}  Med Delivery   { [facility]RISTOP[facility] MED Delivery:486562248}    Wound Care Documentation and Therapy:  Incision 20 Head Posterior (Active)   Number of days: 768        Elimination:  Continence: Bowel: {YES / VX:72609}  Bladder: {YES / WL:65586}  Urinary Catheter: {Urinary Catheter:010055624}   Colostomy/Ileostomy/Ileal Conduit: {YES / EE:21961}       Date of Last BM: ***    Intake/Output Summary (Last 24 hours) at 10/6/2022 0952  Last data filed at 10/5/2022 2225  Gross per 24 hour   Intake 660 ml   Output --   Net 660 ml     I/O last 3 completed shifts:   In: 5 [P.O.:840]  Out: -     Safety Concerns:     [address] [employer] Safety Concerns:118749312}    Impairments/Disabilities:      [address] [employer] Impairments/Disabilities:201693256}    Nutrition Therapy:  Current Nutrition Therapy:   [address] [employer] Diet List:054092117}    Routes of Feeding: {CHP DME Other Feedings:957844979}  Liquids: {Slp liquid thickness:64013}  Daily Fluid Restriction: {CHP DME Yes amt example:931371971}  Last Modified Barium Swallow with Video (Video Swallowing Test): {Done Not Done VEHX:975611361}    Treatments at the Time of

## 2022-10-06 NOTE — PROGRESS NOTES
[facility]  Neurology Follow up    Date:  10/6/2022  Patient Name:  Harvey Jay  YOB: 1960  MRN: [id number]     Neurology following for AMS    PMH of brain aneurysm 2009, HTN, lumbar disc herniation, left parasaggital meningioma s/p resection     Assessment  Right MCA stroke now with significant alteration from baseline  ---  potential seizure focus noted on EEG from the right temporal region on EEG  --- Given her behavioral issues would likely benefit from changing Keppra to another agent, such as Depakote  --- Post-ictal psychosis could also be considered, though, no clear ictal event reported      Plan  DC Keppra  Continue  TID  Will follow      History of Present Illness:  Harvey Jay is a 57 y.o. female presenting for evaluation of altered mental status. There is some conflicting data in the chart, but it appears there is documentation indicating some mental health issues in the past. She also appears to have had left parasaggital meningioma resection in 2020. There is also a history of a large right MCA stroke and coiled basilar tip aneurysm. She is reportedly A+Ox1 at baseline, but is now admitted for hallucinations/delusions. The patient is unable to provide any significant history, frequently hiding under her blankets in bed and with near continuous speech with no relation to events within the room. Patient sitting up in bed and being fed by PCA. Pt was only alert to her self and followed a few commands for me. She was still speaking nonsensical things advising me she was about to have a baby in the bed.         Review of Systems:  Unable to obtain review of symptoms due to poor historian, altered mental status    Medical History:   Past Medical History:   Diagnosis Date    Brain aneurysm     2009    Hypertension     Lumbar herniated disc         Surgical History:   Past Surgical History:   Procedure Laterality Date    BRAIN SURGERY CRANIOTOMY N/A 2020    CRANIOTOMY FRAMELESS STEREOTATIC FOR BRAIN TUMOR [facility]  2020    IR OCCLUSIVE OR EMBOL PERC CENTL NERV SYS 2020 Sulma Lazo MD [facility]    TONSILLECTOMY        Family History:   Unable to obtain due to poor historian, altered mental status    Social History:  Social History     Tobacco Use    Smoking status: Former     Types: Cigarettes     Quit date: 2009     Years since quittin.7   Substance Use Topics    Alcohol use: No    Drug use: No      Current Medications:      Current Facility-Administered Medications   Medication Dose Route Frequency Provider Last Rate Last Admin    atorvastatin (LIPITOR) tablet 80 mg  80 mg Oral Nightly Sammy Menon, DO   80 mg at 10/05/22 2222    enalapril (VASOTEC) tablet 10 mg  10 mg Oral Daily Sammy Menon, DO   10 mg at 10/05/22 1101    metoprolol succinate (TOPROL XL) extended release tablet 50 mg  50 mg Oral Daily Sammy Menon, DO   50 mg at 10/06/22 1156    sodium chloride flush 0.9 % injection 10 mL  10 mL IntraVENous 2 times per day Sammy Menon, DO   10 mL at 10/05/22 2223    sodium chloride flush 0.9 % injection 10 mL  10 mL IntraVENous PRN Sammy Menon, DO        0.9 % sodium chloride infusion   IntraVENous PRN Sammy Menon, DO        enoxaparin (LOVENOX) injection 40 mg  40 mg SubCUTAneous Daily Sammy Menon, DO   40 mg at 10/05/22 1101    promethazine (PHENERGAN) tablet 12.5 mg  12.5 mg Oral Q6H PRN Sammy Menon, DO        polyethylene glycol (GLYCOLAX) packet 17 g  17 g Oral Daily PRN Sammy Menon, DO        acetaminophen (TYLENOL) tablet 650 mg  650 mg Oral Q6H PRN Sammy Menon, DO        Or    acetaminophen (TYLENOL) suppository 650 mg  650 mg Rectal Q6H PRN Sammy Mccormacks, DO        potassium chloride (KLOR-CON M) extended release tablet 20 mEq  20 mEq Oral BID WC Relda Snooks, APRN - CNP   20 mEq at 10/05/22 1654    divalproex (DEPAKOTE SPRINKLE) capsule 250 mg  250 mg Oral 3 times per day Presley Waller DO   250 mg at 10/06/22 0525    melatonin disintegrating tablet 5 mg  5 mg Oral Daily REAGAN Simms CNP   5 mg at 10/05/22 1751      Allergies:      No Known Allergies     Physical Examination  Vitals   Vitals:    10/05/22 1645 10/05/22 2209 10/05/22 2315 10/06/22 0711   BP: (!) 140/89 (!) 138/91 (!) 148/65 (!) 149/89   Pulse: 88  79 65   Resp: 20 22 20   Temp: 98.6 °F (37 °C) 98.2 °F (36.8 °C) 97.9 °F (36.6 °C) (!) 96.7 °F (35.9 °C)   TempSrc: Temporal Temporal Temporal Temporal   SpO2: 94%  95% 91%   Weight:       Height:          General: Patient appears moderately agitated. Awake and oriented x 1 only. HEENT: Normocephalic, atraumatic  Chest: respirations non labored   Extremities/Peripheral vascular: No edema/swelling noted. No cold limbs noted. Neurologic Examination    Mental Status  Alert, and oriented to person only. Speech is fluent, though, has tangential thinking and flight of ideas. Appears to be having hallucinations intermittently, very internally stimulated. Attention poor. Memory poor. Cranial Nerves  II. Visual fields full to threat bitemporally. III, IV, VI: Pupils equally round and reactive to light, 3 to 2 mm bilaterally. EOMs: full, no nystagmus. V. Facial sensation intact to light touch bilaterally  VII: Facial movements symmetric   VIII: Hearing intact to voice  IX,X: Palate elevates symmetrically. No dysarthria  XI: Sternocleidomastoid and trapezius 5/5 bilaterally   XII: Tongue is midline    Motor  Moves all limbs with 4+ to 5/5 strength symmetrically.      Sensation  Light Touch: Intact distally in all four limbs    Reflexes     Right Left   Biceps 2 2   Brachioradialis 2 2   Patellar 1 1   Achilles 0 0   ankle clonus none none   Babinski absent absent     Coordination  Rapid alternating movements normal in bilateral upper extremities  Finger to nose testing normal bilaterally    Gait  Deferred for safety/fall consideration    Labs  Recent Labs     10/06/22  1041   *   K 3.8   *   CO2 25   BUN 15   CREATININE 0.6   GLUCOSE 106*   CALCIUM 8.4*   PROT 6.9   LABALBU 3.4*   BILITOT 0.7   ALKPHOS 63   AST 41*   ALT 23   WBC 7.8   RBC 4.25   HGB 12.4   HCT 38.5   MCV 90.6   MCH 29.2   MCHC 32.2   RDW 12.9      MPV 10.7     Imaging  CT HEAD WO CONTRAST   Final Result   No acute intracranial abnormality. Changes of encephalomalacia right MCA and left PCA territories. EEG: This abnormal study showed evidence of:  A potential for focal seizures from the right temporal region  Moderate nonspecific cerebral dysfunction of the right temporal region  Structural abnormalities should be considered for the findings above and appropriate imaging obtained if clinically indicated. No seizures were noted during this study.     All labs and imaging studies reviewed independently today     Electronically signed by REAGAN Morales CNP on 10/6/2022 at 1:12 PM

## 2022-10-06 NOTE — PROGRESS NOTES
Hospitalist Progress Note      Synopsis: Briefly, patient with history of craniotomy for excision of meningioma, from local nursing facility with mentation baseline of alert and oriented x1 presented to emergency department for altered mentation. Per chart review, allegedly patient has been under more stress due to divorce proceedings are finalized recently. Labs and imaging were unremarkable, inpatient psych not believe this was a behavioral issue. Attempts were made to have patient return to facility and they refused until she was further evaluated from a neurological standpoint. Hospital day 0     Subjective:  Patient seen and examined at bedside with bedside sitter present. Continuing with nonsensical conversation while simultaneously eating her blanket. When asked why she is chewing on her blankets patient states that she is hungry. Stable overnight. No issues reported. Patient seen and examined  Records reviewed. Temp (24hrs), Av.8 °F (36.6 °C), Min:96.7 °F (35.9 °C), Max:98.6 °F (37 °C)    DIET: ADULT DIET; Regular  CODE: Full Code    Intake/Output Summary (Last 24 hours) at 10/6/2022 1022  Last data filed at 10/5/2022 2225  Gross per 24 hour   Intake 660 ml   Output --   Net 660 ml       Review of Systems: All bolded are positive; please see HPI  Review of systems deferred as patient not forthcoming with information    Objective:    BP (!) 149/89   Pulse 65   Temp (!) 96.7 °F (35.9 °C) (Temporal)   Resp 20   Ht 5' 4\" (1.626 m)   Wt 200 lb (90.7 kg)   SpO2 91%   BMI 34.33 kg/m²     General appearance: No apparent distress, appears stated age and cooperative. HEENT: Conjunctivae/corneas clear. Mucous membranes moist.  Neck: Supple. No JVD. Respiratory:  Clear to auscultation bilaterally. Normal respiratory effort. Cardiovascular:  RRR. S1, S2 without MRG. PV: Pulses palpable. No edema. Abdomen: Soft, non-tender, non-distended.  +BS  Musculoskeletal: No obvious ePDMP reviewed for this patient. Rx is appropriate.   Rx forwarded by e-prescribe to preferred pharmacy.       deformities. Skin: Normal skin color. No rashes or lesions. Good turgor. Neurologic:  Grossly non-focal. Awake, alert, following commands. Psychiatric: Alert person only not to place and time, thought content inappropriate, abnormal insight and judgement    Medications:  REVIEWED DAILY    Infusion Medications    sodium chloride       Scheduled Medications    atorvastatin  80 mg Oral Nightly    enalapril  10 mg Oral Daily    metoprolol succinate  50 mg Oral Daily    sodium chloride flush  10 mL IntraVENous 2 times per day    enoxaparin  40 mg SubCUTAneous Daily    potassium chloride  20 mEq Oral BID WC    divalproex  250 mg Oral 3 times per day    melatonin  5 mg Oral Daily     PRN Meds: sodium chloride flush, sodium chloride, promethazine **OR** [DISCONTINUED] ondansetron, polyethylene glycol, acetaminophen **OR** acetaminophen    Labs:     Recent Labs     10/03/22  1222 10/05/22  0626   WBC 10.7 10.5   HGB 14.3 13.0   HCT 42.5 39.0    265       Recent Labs     10/03/22  1222 10/05/22  0626    144   K 3.7 3.4*    107   CO2 26 23   BUN 16 16   CREATININE 0.8 0.7   CALCIUM 9.7 8.5*       Recent Labs     10/03/22  1222 10/05/22  0626   PROT 8.0 7.3   ALKPHOS 74 67   ALT 20 23   AST 33* 45*   BILITOT 1.3* 1.1       No results for input(s): INR in the last 72 hours. No results for input(s): Viola Nasuti in the last 72 hours.     Chronic labs:    Lab Results   Component Value Date    CHOL 182 08/24/2020    TRIG 133 08/24/2020    HDL 44 08/24/2020    LDLCALC 111 (H) 08/24/2020    TSH 1.552 10/12/2010    INR 1.2 09/08/2020    LABA1C 5.4 09/03/2020       Radiology: REVIEWED DAILY    Assessment:  Altered mental status (questionable) patient's baseline is alert and oriented x1) possibly secondary to post ictal psychosis  History of craniotomy for excision of meningioma  Potential seizure focus noted on EEG in right temporal region  Agitation  Hallucination  Hypertension  Hypokalemia potassium 3.4 on 10/5/2022    Plan:  Consult placed for neurology  Consult placed for psychiatry  Consult case management for placement  Stop Keppra start Depakote  Continue home medications  Follow labs replace electrolytes as appropriate         DVT Prophylaxis [x] Lovenox  []  Heparin [] DOAC [] PCDs [] Ambulation    GI Prophylaxis [] PPI  [] H2 Blocker   [] Carafate  [x] Diet/Tube Feeds   Level of care [] Med/Surg  [x] Intermediate  []  ICU   Diet ADULT DIET; Regular    Family contact [x]  N/A    [] At bedside  [] Phone call   Disposition Patient requires continued admission further evaluation of altered mental status   MDM [x] Low    [] Moderate  []   High       Discharge Plan: Patient will likely need placement into appropriate facility    +++++++++++++++++++++++++++++++++++++++++++++++++  JOSUE Acosta/ [address]  +++++++++++++++++++++++++++++++++++++++++++++++++  NOTE: This report was transcribed using voice recognition software. Every effort was made to ensure accuracy; however, inadvertent computerized transcription errors may be present.

## 2022-10-06 NOTE — PLAN OF CARE
Problem: Chronic Conditions and Co-morbidities  Goal: Patient's chronic conditions and co-morbidity symptoms are monitored and maintained or improved  Outcome: Not Progressing     Problem: Discharge Planning  Goal: Discharge to home or other facility with appropriate resources  Outcome: Not Progressing     Problem: Chronic Conditions and Co-morbidities  Goal: Patient's chronic conditions and co-morbidity symptoms are monitored and maintained or improved  Outcome: Not Progressing     Problem: Discharge Planning  Goal: Discharge to home or other facility with appropriate resources  Outcome: Not Progressing

## 2022-10-06 NOTE — PLAN OF CARE
Problem: Chronic Conditions and Co-morbidities  Goal: Patient's chronic conditions and co-morbidity symptoms are monitored and maintained or improved  10/6/2022 0119 by Elena Okeefe RN  Outcome: Progressing  10/5/2022 2022 by Matias Mccullough RN  Outcome: Not Progressing     Problem: Discharge Planning  Goal: Discharge to home or other facility with appropriate resources  10/6/2022 0119 by Elena Okeefe RN  Outcome: Progressing  10/5/2022 2022 by Matias Mccullough RN  Outcome: Not Progressing     Problem: Skin/Tissue Integrity  Goal: Absence of new skin breakdown  Description: 1. Monitor for areas of redness and/or skin breakdown  2. Assess vascular access sites hourly  3. Every 4-6 hours minimum:  Change oxygen saturation probe site  4. Every 4-6 hours:  If on nasal continuous positive airway pressure, respiratory therapy assess nares and determine need for appliance change or resting period.   Outcome: Progressing     Problem: Safety - Adult  Goal: Free from fall injury  Outcome: Progressing     Problem: ABCDS Injury Assessment  Goal: Absence of physical injury  Outcome: Progressing     Problem: Chronic Conditions and Co-morbidities  Goal: Patient's chronic conditions and co-morbidity symptoms are monitored and maintained or improved  10/6/2022 0119 by Elena Okeefe RN  Outcome: Progressing  10/5/2022 2022 by Matias Mccullough RN  Outcome: Not Progressing     Problem: Discharge Planning  Goal: Discharge to home or other facility with appropriate resources  10/6/2022 0119 by Elena Okeefe RN  Outcome: Progressing  10/5/2022 2022 by Matias Mccullough RN  Outcome: Not Progressing

## 2022-10-06 NOTE — PROGRESS NOTES
Behavioral health follow-up    I went to see the patient this afternoon. Patient seems to be calmer than on assessment yesterday. she does not appear to be internally stimulated does not appear to be responding to unseen others. I spoke with patient's sitter who states the patient does not had any aggressive behaviors. Patient is however having impulsive behaviors such as fondling her breasts. Her speech makes no sense she just has random words still is not alert or oriented states that she is in [place] believes the date is \"8 January. \"  But knows the year is 2022. Overall some improvement from yesterday her speech is less pressured but she remains disorganized not able to answer questions appropriately not able to make a coherent sentence. She just blurts out random words sometimes or names of people. Information the chart is conflicting regarding patient's baseline appears per ED  note patient's family indicates that \"patient has not been normal for a long time. 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 At the time of my evaluation patient was not responding to unseen others was does not have any behavioral disturbances but her thoughts were very disorganized and she was not making any sense. she was alert to person and to year only. Her orientation seems to be waning at times she has been charted to be alert to person and place for me she was alert to person and year. However the H&P indicates the patient is alert to self only at baseline. Mental status admission:   Psychomotor evaluation revealed some psychomotor agitation she was inappropriately fondling her breasts. Her eye contact is fair her speech is normal rate rhythm and tone. Unable to assess mood affect is slightly anxious. Thought process is completely disorganized and confused. Her thought contents are without any clear thoughts patient just speaks random words. Her cognitive function is either at her baseline or below her baseline conflicting information to chart indicating patient's level of orientation and normal baseline level of functioning her insight judgment is poor she is alert oriented to person and year      Clinical impression:  Delirium      Plans and recommendations: We will continue neurology's recommendations of Depakote  250 mg twice daily which is also a effective mood stabilizer. 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 Psychiatry signs off. Please reconsult with any acute psychiatric needs or concerns.

## 2022-10-07 LAB
ALBUMIN SERPL-MCNC: 3.8 G/DL (ref 3.5–5.2)
ALP BLD-CCNC: 65 U/L (ref 35–104)
ALT SERPL-CCNC: 26 U/L (ref 0–32)
ANION GAP SERPL CALCULATED.3IONS-SCNC: 12 MMOL/L (ref 7–16)
ANION GAP SERPL CALCULATED.3IONS-SCNC: 16 MMOL/L (ref 7–16)
AST SERPL-CCNC: 39 U/L (ref 0–31)
BASOPHILS ABSOLUTE: 0.03 E9/L (ref 0–0.2)
BASOPHILS RELATIVE PERCENT: 0.3 % (ref 0–2)
BILIRUB SERPL-MCNC: 0.6 MG/DL (ref 0–1.2)
BUN BLDV-MCNC: 13 MG/DL (ref 6–23)
BUN BLDV-MCNC: 16 MG/DL (ref 6–23)
CALCIUM SERPL-MCNC: 9.4 MG/DL (ref 8.6–10.2)
CALCIUM SERPL-MCNC: 9.4 MG/DL (ref 8.6–10.2)
CHLORIDE BLD-SCNC: 108 MMOL/L (ref 98–107)
CHLORIDE BLD-SCNC: 108 MMOL/L (ref 98–107)
CO2: 25 MMOL/L (ref 22–29)
CO2: 27 MMOL/L (ref 22–29)
CREAT SERPL-MCNC: 0.6 MG/DL (ref 0.5–1)
CREAT SERPL-MCNC: 0.6 MG/DL (ref 0.5–1)
EOSINOPHILS ABSOLUTE: 0.28 E9/L (ref 0.05–0.5)
EOSINOPHILS RELATIVE PERCENT: 3.2 % (ref 0–6)
GFR AFRICAN AMERICAN: >60
GFR AFRICAN AMERICAN: >60
GFR NON-AFRICAN AMERICAN: >60 ML/MIN/1.73
GFR NON-AFRICAN AMERICAN: >60 ML/MIN/1.73
GLUCOSE BLD-MCNC: 104 MG/DL (ref 74–99)
GLUCOSE BLD-MCNC: 116 MG/DL (ref 74–99)
HCT VFR BLD CALC: 41.8 % (ref 34–48)
HEMOGLOBIN: 13.5 G/DL (ref 11.5–15.5)
IMMATURE GRANULOCYTES #: 0.03 E9/L
IMMATURE GRANULOCYTES %: 0.3 % (ref 0–5)
LYMPHOCYTES ABSOLUTE: 1.85 E9/L (ref 1.5–4)
LYMPHOCYTES RELATIVE PERCENT: 21.1 % (ref 20–42)
MAGNESIUM: 2 MG/DL (ref 1.6–2.6)
MCH RBC QN AUTO: 29.5 PG (ref 26–35)
MCHC RBC AUTO-ENTMCNC: 32.3 % (ref 32–34.5)
MCV RBC AUTO: 91.3 FL (ref 80–99.9)
MONOCYTES ABSOLUTE: 1 E9/L (ref 0.1–0.95)
MONOCYTES RELATIVE PERCENT: 11.4 % (ref 2–12)
NEUTROPHILS ABSOLUTE: 5.59 E9/L (ref 1.8–7.3)
NEUTROPHILS RELATIVE PERCENT: 63.7 % (ref 43–80)
PDW BLD-RTO: 13 FL (ref 11.5–15)
PLATELET # BLD: 274 E9/L (ref 130–450)
PMV BLD AUTO: 11.3 FL (ref 7–12)
POTASSIUM REFLEX MAGNESIUM: 3.6 MMOL/L (ref 3.5–5)
POTASSIUM SERPL-SCNC: 3.3 MMOL/L (ref 3.5–5)
RBC # BLD: 4.58 E12/L (ref 3.5–5.5)
SODIUM BLD-SCNC: 145 MMOL/L (ref 132–146)
SODIUM BLD-SCNC: 151 MMOL/L (ref 132–146)
TOTAL PROTEIN: 7.4 G/DL (ref 6.4–8.3)
WBC # BLD: 8.8 E9/L (ref 4.5–11.5)

## 2022-10-07 PROCEDURE — 6370000000 HC RX 637 (ALT 250 FOR IP): Performed by: FAMILY MEDICINE

## 2022-10-07 PROCEDURE — 6370000000 HC RX 637 (ALT 250 FOR IP)

## 2022-10-07 PROCEDURE — 1200000000 HC SEMI PRIVATE

## 2022-10-07 PROCEDURE — 85025 COMPLETE CBC W/AUTO DIFF WBC: CPT

## 2022-10-07 PROCEDURE — 80048 BASIC METABOLIC PNL TOTAL CA: CPT

## 2022-10-07 PROCEDURE — 36415 COLL VENOUS BLD VENIPUNCTURE: CPT

## 2022-10-07 PROCEDURE — 83735 ASSAY OF MAGNESIUM: CPT

## 2022-10-07 PROCEDURE — 99232 SBSQ HOSP IP/OBS MODERATE 35: CPT | Performed by: NURSE PRACTITIONER

## 2022-10-07 PROCEDURE — 6370000000 HC RX 637 (ALT 250 FOR IP): Performed by: PSYCHIATRY & NEUROLOGY

## 2022-10-07 PROCEDURE — 6370000000 HC RX 637 (ALT 250 FOR IP): Performed by: NURSE PRACTITIONER

## 2022-10-07 PROCEDURE — 6370000000 HC RX 637 (ALT 250 FOR IP): Performed by: INTERNAL MEDICINE

## 2022-10-07 PROCEDURE — 80053 COMPREHEN METABOLIC PANEL: CPT

## 2022-10-07 RX ORDER — HALOPERIDOL 5 MG/ML
2 INJECTION INTRAMUSCULAR ONCE
Status: DISCONTINUED | OUTPATIENT
Start: 2022-10-07 | End: 2022-10-10 | Stop reason: HOSPADM

## 2022-10-07 RX ORDER — POTASSIUM CHLORIDE 20 MEQ/1
20 TABLET, EXTENDED RELEASE ORAL ONCE
Status: COMPLETED | OUTPATIENT
Start: 2022-10-07 | End: 2022-10-07

## 2022-10-07 RX ORDER — SODIUM CHLORIDE 450 MG/100ML
INJECTION, SOLUTION INTRAVENOUS CONTINUOUS
Status: DISCONTINUED | OUTPATIENT
Start: 2022-10-07 | End: 2022-10-07

## 2022-10-07 RX ADMIN — DIVALPROEX SODIUM 250 MG: 125 CAPSULE, COATED PELLETS ORAL at 21:27

## 2022-10-07 RX ADMIN — METOPROLOL SUCCINATE 50 MG: 25 TABLET, FILM COATED, EXTENDED RELEASE ORAL at 10:10

## 2022-10-07 RX ADMIN — POTASSIUM CHLORIDE 20 MEQ: 20 TABLET, EXTENDED RELEASE ORAL at 18:00

## 2022-10-07 RX ADMIN — Medication 5 MG: at 18:00

## 2022-10-07 RX ADMIN — DIVALPROEX SODIUM 250 MG: 125 CAPSULE, COATED PELLETS ORAL at 13:46

## 2022-10-07 RX ADMIN — ENALAPRIL MALEATE 10 MG: 5 TABLET ORAL at 10:10

## 2022-10-07 RX ADMIN — POTASSIUM CHLORIDE 20 MEQ: 1500 TABLET, EXTENDED RELEASE ORAL at 10:13

## 2022-10-07 RX ADMIN — DIVALPROEX SODIUM 250 MG: 125 CAPSULE, COATED PELLETS ORAL at 06:01

## 2022-10-07 RX ADMIN — POTASSIUM CHLORIDE 20 MEQ: 20 TABLET, EXTENDED RELEASE ORAL at 10:10

## 2022-10-07 RX ADMIN — ATORVASTATIN CALCIUM 80 MG: 40 TABLET, FILM COATED ORAL at 21:28

## 2022-10-07 NOTE — PROGRESS NOTES
Hospitalist Progress Note      Synopsis: Briefly, patient with history of craniotomy for excision of meningioma, from local nursing facility with mentation baseline of alert and oriented x1 presented to emergency department for altered mentation. Per chart review, allegedly patient has been under more stress due to divorce proceedings are finalized recently. Labs and imaging were unremarkable, inpatient psych not believe this was a behavioral issue. Attempts were made to have patient return to facility and they refused until she was further evaluated from a neurological standpoint. CT of the brain shows encephalomalacia of the right Mca and left PCA territories. Qtc ws prolonged at 614 but has improved to 468. EEG ws performed that showed a potential for focal seizures from the right temporal region and moderate nonspecific cerebral dysfunction of the right temporal region. Started on depakote as per neurology. Neurology is concerned this may be her new baseline. Unfortunately, increased Na of 151 today. Started on  NS    Subjective:  Patient seen and examined at bedside with bedside sitter present  Patient is moving her labs up and down and saying she is the \"Real [place]\"      Temp (24hrs), Av.5 °F (36.4 °C), Min:97.1 °F (36.2 °C), Max:97.8 °F (36.6 °C)    Objective:    BP (!) 135/107   Pulse 62   Temp 97.1 °F (36.2 °C) (Temporal)   Resp 18   Ht 5' 4\" (1.626 m)   Wt 200 lb (90.7 kg)   SpO2 96%   BMI 34.33 kg/m²     General appearance: No apparent distress, appears stated age and cooperative. HEENT: Conjunctivae/corneas clear. Mucous membranes moist.  Neck: Supple. No JVD. Respiratory:  Clear to auscultation bilaterally. Normal respiratory effort. Cardiovascular:  RRR. S1, S2 without MRG. PV: Pulses palpable. No edema. Abdomen: Soft, non-tender, non-distended. +BS  Musculoskeletal: No obvious deformities. Skin: Normal skin color. No rashes or lesions. Good turgor.    Neurologic: Grossly non-focal. Awake, alert, following commands. Psychiatric: Alert person only not to place and time, thought content inappropriate, abnormal insight and judgement    Medications:  REVIEWED DAILY    Infusion Medications    sodium chloride Stopped (10/07/22 7868)    sodium chloride       Scheduled Medications    atorvastatin  80 mg Oral Nightly    enalapril  10 mg Oral Daily    metoprolol succinate  50 mg Oral Daily    sodium chloride flush  10 mL IntraVENous 2 times per day    enoxaparin  40 mg SubCUTAneous Daily    potassium chloride  20 mEq Oral BID WC    divalproex  250 mg Oral 3 times per day    melatonin  5 mg Oral Daily     PRN Meds: sodium chloride flush, sodium chloride, polyethylene glycol, acetaminophen **OR** acetaminophen    Labs:     Recent Labs     10/05/22  0626 10/06/22  1041 10/07/22  0454   WBC 10.5 7.8 8.8   HGB 13.0 12.4 13.5   HCT 39.0 38.5 41.8    241 274       Recent Labs     10/05/22  0626 10/06/22  1041 10/07/22  0454    147* 151*   K 3.4* 3.8 3.3*    110* 108*   CO2 23 25 27   BUN 16 15 16   CREATININE 0.7 0.6 0.6   CALCIUM 8.5* 8.4* 9.4       Recent Labs     10/05/22  0626 10/06/22  1041 10/07/22  0454   PROT 7.3 6.9 7.4   ALKPHOS 67 63 65   ALT 23 23 26   AST 45* 41* 39*   BILITOT 1.1 0.7 0.6       No results for input(s): INR in the last 72 hours. No results for input(s): Randene Holes in the last 72 hours.     Chronic labs:    Lab Results   Component Value Date    CHOL 182 08/24/2020    TRIG 133 08/24/2020    HDL 44 08/24/2020    LDLCALC 111 (H) 08/24/2020    TSH 1.552 10/12/2010    INR 1.2 09/08/2020    LABA1C 5.4 09/03/2020       Radiology: REVIEWED DAILY    Assessment:  Altered mental status (questionable) patient's baseline is alert and oriented x1) possibly secondary to post ictal psychosis  History of craniotomy for excision of meningioma  Potential seizure focus noted on EEG in right temporal region  Agitation  Hallucination  Hypertension  Hypokalemia potassium 3.4 on 10/5/2022  Hypernatremia    Plan:  EEG abnormal-started on depakote  Neurology and psychiatry signed off   Consult case management for placement  Continue depakote  Continue home medications  Follow labs replace electrolytes as appropriate  On potassium replacement  Patient with increasing sodium. Oral intake to be encouraged by sitter and start 1/2 NS. Repeat BMP this afternoon. DVT Prophylaxis [x] Lovenox  []  Heparin [] DOAC [] PCDs [] Ambulation    GI Prophylaxis [] PPI  [] H2 Blocker   [] Carafate  [x] Diet/Tube Feeds   Level of care [] Med/Surg  [x] Intermediate  []  ICU   Diet ADULT DIET; Regular    Family contact [x]  N/A    [] At bedside  [] Phone call   Disposition Patient requires continued admission further evaluation of altered mental status   MDM [x] Low    [] Moderate  []   High       Discharge Plan: Patient will likely need placement into appropriate facility. Unfortunately, not medically stable for discharge secondary to hypernatremia    +++++++++++++++++++++++++++++++++++++++++++++++++  [address] [place], [place]  +++++++++++++++++++++++++++++++++++++++++++++++++  NOTE: This report was transcribed using voice recognition software. Every effort was made to ensure accuracy; however, inadvertent computerized transcription errors may be present.

## 2022-10-07 NOTE — PROGRESS NOTES
Patient still delusional, having flight of ideas, and visual hallucinations. Unable to really have a conversation with pt, as she she blurts out random peoples names and random words. Pt also still wanting to remain naked and will only cover her body with a sheet, inappropriately touching her breasts.

## 2022-10-07 NOTE — PROGRESS NOTES
Patient IV was leaking. Patient very agitated and refusing to allow RN to attempt to place new one. Dr. Rosemarie Boas notified unable to start fluids.  Will encourage PO intake until new IV can be placed

## 2022-10-07 NOTE — PROGRESS NOTES
[facility]  Neurology Follow up    Date:  10/7/2022  Patient Name:  Edith Ward  YOB: 1960  MRN: [id number]     Neurology following for AMS    PMH of brain aneurysm 2009, HTN, lumbar disc herniation, left parasaggital meningioma s/p resection     Assessment  Right MCA stroke now with significant alteration from baseline  ---  potential seizure focus noted on EEG from the right temporal region on EEG  --- Given her behavioral issues changed Keppra to Depakote  --- Post-ictal psychosis could also be considered, though, no clear ictal event reported  --- no seizures reported     Plan  Continue  TID  No further Neurologic testing at this time  Neurology to sign off       History of Present Illness:  Edith Ward is a 57 y.o. female presenting for evaluation of altered mental status. There is some conflicting data in the chart, but it appears there is documentation indicating some mental health issues in the past. She also appears to have had left parasaggital meningioma resection in 2020. There is also a history of a large right MCA stroke and coiled basilar tip aneurysm. She is reportedly A+Ox1 at baseline, but is now admitted for hallucinations/delusions. The patient is unable to provide any significant history, frequently hiding under her blankets in bed and with near continuous speech with no relation to events within the room. Patient sitting up in bed with sitter at bedside. She does not follow commands but answers to her name when called. Still with delusional thinking which I am afraid might be her baseline.         Review of Systems:  Unable to obtain review of symptoms due to poor historian, altered mental status    Medical History:   Past Medical History:   Diagnosis Date    Brain aneurysm     2009    Hypertension     Lumbar herniated disc         Surgical History:   Past Surgical History:   Procedure Laterality Date    BRAIN SURGERY CRANIOTOMY N/A 2020    CRANIOTOMY FRAMELESS STEREOTATIC FOR BRAIN TUMOR performed by Jovanny Pineda MD at [address] [facility]  2020    IR OCCLUSIVE OR EMBOL [facility] 2020 Andie Courtney MD [facility]    TONSILLECTOMY        Family History:   Unable to obtain due to poor historian, altered mental status    Social History:  Social History     Tobacco Use    Smoking status: Former     Types: Cigarettes     Quit date: 2009     Years since quittin.7   Substance Use Topics    Alcohol use: No    Drug use: No      Current Medications:      Current Facility-Administered Medications   Medication Dose Route Frequency Provider Last Rate Last Admin    0.45 % sodium chloride infusion   IntraVENous Continuous Dutch Pavy, DO        atorvastatin (LIPITOR) tablet 80 mg  80 mg Oral Nightly Bettylou Sicks, DO   80 mg at 10/06/22 2023    enalapril (VASOTEC) tablet 10 mg  10 mg Oral Daily Bettylou Sicks, DO   10 mg at 10/07/22 1010    metoprolol succinate (TOPROL XL) extended release tablet 50 mg  50 mg Oral Daily Bettylou Sicks, DO   50 mg at 10/07/22 1010    sodium chloride flush 0.9 % injection 10 mL  10 mL IntraVENous 2 times per day Bettylou Sicks, DO   10 mL at 10/06/22 2023    sodium chloride flush 0.9 % injection 10 mL  10 mL IntraVENous PRN Bettylou Sicks, DO        0.9 % sodium chloride infusion   IntraVENous PRN Bettylou Sicks, DO        enoxaparin (LOVENOX) injection 40 mg  40 mg SubCUTAneous Daily Bettylou Sicks, DO   40 mg at 10/05/22 1101    promethazine (PHENERGAN) tablet 12.5 mg  12.5 mg Oral Q6H PRN Bettylou Sicks, DO        polyethylene glycol (GLYCOLAX) packet 17 g  17 g Oral Daily PRN Bettylou Sicks, DO        acetaminophen (TYLENOL) tablet 650 mg  650 mg Oral Q6H PRN Bettylou Sicks, DO        Or    acetaminophen (TYLENOL) suppository 650 mg  650 mg Rectal Q6H PRN Bettylou Sicks, DO        potassium chloride (KLOR-CON M) extended release tablet 20 mEq  20 mEq Oral BID  Andrei Mireles APRN - CNP   20 mEq at 10/07/22 1010    divalproex (DEPAKOTE SPRINKLE) capsule 250 mg  250 mg Oral 3 times per day Rosana Brenner DO   250 mg at 10/07/22 0601    melatonin disintegrating tablet 5 mg  5 mg Oral Daily Beverly Carter APRN - CNP   5 mg at 10/06/22 1806      Allergies:      No Known Allergies     Physical Examination  Vitals   Vitals:    10/05/22 2315 10/06/22 0711 10/06/22 1945 10/07/22 0708   BP: (!) 148/65 (!) 149/89 (!) 138/99 (!) 135/107   Pulse: 79 65 79 62   Resp: 22 20 18 18   Temp: 97.9 °F (36.6 °C) (!) 96.7 °F (35.9 °C) 97.8 °F (36.6 °C) 97.1 °F (36.2 °C)   TempSrc: Temporal Temporal Temporal Temporal   SpO2: 95% 91% 93% 96%   Weight:       Height:          General: Patient appears moderately agitated. Awake and oriented x 1 only. HEENT: Normocephalic, atraumatic  Chest: respirations non labored   Extremities/Peripheral vascular: No edema/swelling noted. No cold limbs noted. Neurologic Examination    Mental Status  Alert, and oriented to person only. Speech is fluent, though, has tangential thinking and flight of ideas. Appears to be having hallucinations intermittently, very internally stimulated. Attention poor. Memory poor. Cranial Nerves  II. III, IV, VI:    EOMs:   V.   VII: Facial movements symmetric   VIII: Hearing intact to voice  IX,X:   XI: Sternocleidomastoid and trapezius 5/5 bilaterally   XII:     Motor  Moves all limbs with 4+ to 5/5 strength symmetrically. Labs  Recent Labs     10/07/22  0454   *   K 3.3*   *   CO2 27   BUN 16   CREATININE 0.6   GLUCOSE 116*   CALCIUM 9.4   PROT 7.4   LABALBU 3.8   BILITOT 0.6   ALKPHOS 65   AST 39*   ALT 26   WBC 8.8   RBC 4.58   HGB 13.5   HCT 41.8   MCV 91.3   MCH 29.5   MCHC 32.3   RDW 13.0      MPV 11.3     Imaging  CT HEAD WO CONTRAST   Final Result   No acute intracranial abnormality.       Changes of encephalomalacia right MCA and left PCA territories. EEG: This abnormal study showed evidence of:  A potential for focal seizures from the right temporal region  Moderate nonspecific cerebral dysfunction of the right temporal region  Structural abnormalities should be considered for the findings above and appropriate imaging obtained if clinically indicated. No seizures were noted during this study.     All labs and imaging studies reviewed independently today     Electronically signed by REAGAN Anderson CNP on 10/7/2022 at 11:44 AM

## 2022-10-08 LAB
ALBUMIN SERPL-MCNC: 3.9 G/DL (ref 3.5–5.2)
ALP BLD-CCNC: 63 U/L (ref 35–104)
ALT SERPL-CCNC: 22 U/L (ref 0–32)
ANION GAP SERPL CALCULATED.3IONS-SCNC: 13 MMOL/L (ref 7–16)
ANION GAP SERPL CALCULATED.3IONS-SCNC: 8 MMOL/L (ref 7–16)
ANION GAP SERPL CALCULATED.3IONS-SCNC: 9 MMOL/L (ref 7–16)
AST SERPL-CCNC: 26 U/L (ref 0–31)
BASOPHILS ABSOLUTE: 0.04 E9/L (ref 0–0.2)
BASOPHILS RELATIVE PERCENT: 0.5 % (ref 0–2)
BILIRUB SERPL-MCNC: 0.6 MG/DL (ref 0–1.2)
BUN BLDV-MCNC: 14 MG/DL (ref 6–23)
BUN BLDV-MCNC: 15 MG/DL (ref 6–23)
BUN BLDV-MCNC: 15 MG/DL (ref 6–23)
CALCIUM SERPL-MCNC: 8.9 MG/DL (ref 8.6–10.2)
CALCIUM SERPL-MCNC: 9 MG/DL (ref 8.6–10.2)
CALCIUM SERPL-MCNC: 9.2 MG/DL (ref 8.6–10.2)
CHLORIDE BLD-SCNC: 105 MMOL/L (ref 98–107)
CHLORIDE BLD-SCNC: 109 MMOL/L (ref 98–107)
CHLORIDE BLD-SCNC: 109 MMOL/L (ref 98–107)
CO2: 24 MMOL/L (ref 22–29)
CO2: 30 MMOL/L (ref 22–29)
CO2: 31 MMOL/L (ref 22–29)
CREAT SERPL-MCNC: 0.7 MG/DL (ref 0.5–1)
EOSINOPHILS ABSOLUTE: 0.36 E9/L (ref 0.05–0.5)
EOSINOPHILS RELATIVE PERCENT: 4.3 % (ref 0–6)
GFR AFRICAN AMERICAN: >60
GFR NON-AFRICAN AMERICAN: >60 ML/MIN/1.73
GLUCOSE BLD-MCNC: 101 MG/DL (ref 74–99)
GLUCOSE BLD-MCNC: 103 MG/DL (ref 74–99)
GLUCOSE BLD-MCNC: 103 MG/DL (ref 74–99)
HCT VFR BLD CALC: 43.5 % (ref 34–48)
HEMOGLOBIN: 14.1 G/DL (ref 11.5–15.5)
IMMATURE GRANULOCYTES #: 0.03 E9/L
IMMATURE GRANULOCYTES %: 0.4 % (ref 0–5)
LYMPHOCYTES ABSOLUTE: 2.02 E9/L (ref 1.5–4)
LYMPHOCYTES RELATIVE PERCENT: 24.2 % (ref 20–42)
MCH RBC QN AUTO: 30.2 PG (ref 26–35)
MCHC RBC AUTO-ENTMCNC: 32.4 % (ref 32–34.5)
MCV RBC AUTO: 93.1 FL (ref 80–99.9)
MONOCYTES ABSOLUTE: 0.8 E9/L (ref 0.1–0.95)
MONOCYTES RELATIVE PERCENT: 9.6 % (ref 2–12)
NEUTROPHILS ABSOLUTE: 5.11 E9/L (ref 1.8–7.3)
NEUTROPHILS RELATIVE PERCENT: 61 % (ref 43–80)
PDW BLD-RTO: 13.2 FL (ref 11.5–15)
PLATELET # BLD: 263 E9/L (ref 130–450)
PMV BLD AUTO: 10.6 FL (ref 7–12)
POTASSIUM REFLEX MAGNESIUM: 3.8 MMOL/L (ref 3.5–5)
POTASSIUM SERPL-SCNC: 4 MMOL/L (ref 3.5–5)
POTASSIUM SERPL-SCNC: 4.1 MMOL/L (ref 3.5–5)
RBC # BLD: 4.67 E12/L (ref 3.5–5.5)
SODIUM BLD-SCNC: 142 MMOL/L (ref 132–146)
SODIUM BLD-SCNC: 148 MMOL/L (ref 132–146)
SODIUM BLD-SCNC: 148 MMOL/L (ref 132–146)
TOTAL PROTEIN: 7.5 G/DL (ref 6.4–8.3)
WBC # BLD: 8.4 E9/L (ref 4.5–11.5)

## 2022-10-08 PROCEDURE — 85025 COMPLETE CBC W/AUTO DIFF WBC: CPT

## 2022-10-08 PROCEDURE — 6370000000 HC RX 637 (ALT 250 FOR IP): Performed by: PSYCHIATRY & NEUROLOGY

## 2022-10-08 PROCEDURE — 6360000002 HC RX W HCPCS: Performed by: FAMILY MEDICINE

## 2022-10-08 PROCEDURE — 6370000000 HC RX 637 (ALT 250 FOR IP): Performed by: NURSE PRACTITIONER

## 2022-10-08 PROCEDURE — 6370000000 HC RX 637 (ALT 250 FOR IP): Performed by: FAMILY MEDICINE

## 2022-10-08 PROCEDURE — 80053 COMPREHEN METABOLIC PANEL: CPT

## 2022-10-08 PROCEDURE — 80048 BASIC METABOLIC PNL TOTAL CA: CPT

## 2022-10-08 PROCEDURE — 36415 COLL VENOUS BLD VENIPUNCTURE: CPT

## 2022-10-08 PROCEDURE — 1200000000 HC SEMI PRIVATE

## 2022-10-08 PROCEDURE — 2500000003 HC RX 250 WO HCPCS: Performed by: FAMILY MEDICINE

## 2022-10-08 PROCEDURE — 6370000000 HC RX 637 (ALT 250 FOR IP)

## 2022-10-08 RX ADMIN — ANTI-FUNGAL POWDER MICONAZOLE NITRATE TALC FREE: 1.42 POWDER TOPICAL at 23:04

## 2022-10-08 RX ADMIN — DIVALPROEX SODIUM 250 MG: 125 CAPSULE, COATED PELLETS ORAL at 23:04

## 2022-10-08 RX ADMIN — DIVALPROEX SODIUM 250 MG: 125 CAPSULE, COATED PELLETS ORAL at 13:42

## 2022-10-08 RX ADMIN — ENOXAPARIN SODIUM 40 MG: 100 INJECTION SUBCUTANEOUS at 10:08

## 2022-10-08 RX ADMIN — POTASSIUM CHLORIDE 20 MEQ: 20 TABLET, EXTENDED RELEASE ORAL at 09:33

## 2022-10-08 RX ADMIN — Medication 5 MG: at 17:54

## 2022-10-08 RX ADMIN — METOPROLOL SUCCINATE 50 MG: 25 TABLET, FILM COATED, EXTENDED RELEASE ORAL at 10:05

## 2022-10-08 RX ADMIN — DIVALPROEX SODIUM 250 MG: 125 CAPSULE, COATED PELLETS ORAL at 06:12

## 2022-10-08 RX ADMIN — ENALAPRIL MALEATE 10 MG: 5 TABLET ORAL at 10:07

## 2022-10-08 RX ADMIN — ATORVASTATIN CALCIUM 80 MG: 40 TABLET, FILM COATED ORAL at 23:04

## 2022-10-08 RX ADMIN — POTASSIUM CHLORIDE 20 MEQ: 20 TABLET, EXTENDED RELEASE ORAL at 17:00

## 2022-10-08 NOTE — PROGRESS NOTES
I sent a message to Dr. Bertin Hinds regarding the redness under her breasts, abdominal folds and rt groin.

## 2022-10-08 NOTE — PROGRESS NOTES
Hospitalist Progress Note      Synopsis: Briefly, patient with history of craniotomy for excision of meningioma, from local nursing facility with mentation baseline of alert and oriented x1 presented to emergency department for altered mentation on 10/3/22. Per chart review, allegedly patient has been under more stress due to divorce proceedings are finalized recently. Labs and imaging were unremarkable, inpatient psych not believe this was a behavioral issue. Attempts were made to have patient return to facility and they refused until she was further evaluated from a neurological standpoint. CT of the brain shows encephalomalacia of the right Mca and left PCA territories. Qtc ws prolonged at 614 but has improved to 468. EEG ws performed that showed a potential for focal seizures from the right temporal region and moderate nonspecific cerebral dysfunction of the right temporal region. Started on depakote as per neurology. Neurology is concerned this may be her new baseline. On 10/7 her serum sodium increased to 151 mmol/L and she was started on half-normal saline. Subjective:    Seen sitting up in bed in no distress. She tells me that she is doing her daily sit ups. She also was attempting to smoke the tie on her gown. Temp (24hrs), Av.5 °F (36.4 °C), Min:97.1 °F (36.2 °C), Max:97.8 °F (36.6 °C)    Objective:    BP (!) 150/83   Pulse 60   Temp 97.8 °F (36.6 °C) (Temporal)   Resp 18   Ht 5' 4\" (1.626 m)   Wt 205 lb (93 kg)   SpO2 97%   BMI 35.19 kg/m²     General appearance: No apparent distress, appears stated age and cooperative. HEENT: Conjunctivae/corneas clear. Mucous membranes moist.  Neck: Supple. No JVD. Respiratory:  Clear to auscultation bilaterally. Normal respiratory effort. Cardiovascular:  RRR. S1, S2 without MRG. PV: Pulses palpable. No edema. Abdomen: Soft, non-tender, non-distended. +BS  Musculoskeletal: No obvious deformities. Skin: Normal skin color.   No rashes or lesions. Good turgor. Neurologic:  Grossly non-focal. Awake, alert, following commands. Psychiatric: Alert person only not to place and time, thought content inappropriate, abnormal insight and judgement    Medications:  REVIEWED DAILY    Infusion Medications    sodium chloride       Scheduled Medications    haloperidol lactate  2 mg IntraMUSCular Once    atorvastatin  80 mg Oral Nightly    enalapril  10 mg Oral Daily    metoprolol succinate  50 mg Oral Daily    sodium chloride flush  10 mL IntraVENous 2 times per day    enoxaparin  40 mg SubCUTAneous Daily    potassium chloride  20 mEq Oral BID WC    divalproex  250 mg Oral 3 times per day    melatonin  5 mg Oral Daily     PRN Meds: sodium chloride flush, sodium chloride, polyethylene glycol, acetaminophen **OR** acetaminophen    Labs:     Recent Labs     10/06/22  1041 10/07/22  0454 10/08/22  0558   WBC 7.8 8.8 8.4   HGB 12.4 13.5 14.1   HCT 38.5 41.8 43.5    274 263         Recent Labs     10/07/22  0454 10/07/22  1652 10/08/22  0558   * 145 148*  148*   K 3.3* 3.6 4.0  4.1   * 108* 109*  109*   CO2 27 25 30*  31*   BUN 16 13 15  15   CREATININE 0.6 0.6 0.7  0.7   CALCIUM 9.4 9.4 9.2  9.0         Recent Labs     10/06/22  1041 10/07/22  0454 10/08/22  0558   PROT 6.9 7.4 7.5   ALKPHOS 63 65 63   ALT 23 26 22   AST 41* 39* 26   BILITOT 0.7 0.6 0.6         No results for input(s): INR in the last 72 hours. No results for input(s): Doyne Knock in the last 72 hours.     Chronic labs:    Lab Results   Component Value Date    CHOL 182 08/24/2020    TRIG 133 08/24/2020    HDL 44 08/24/2020    LDLCALC 111 (H) 08/24/2020    TSH 1.552 10/12/2010    INR 1.2 09/08/2020    LABA1C 5.4 09/03/2020       Radiology: REVIEWED DAILY    Assessment:  Altered mental status (questionable) patient's baseline is alert and oriented x1) possibly secondary to post ictal psychosis  History of craniotomy for excision of meningioma  Potential seizure focus noted on EEG in right temporal region  Agitation  Hallucination  Hypertension  Hypokalemia potassium 3.4 on 10/5/2022  Hypernatremia    Plan:  EEG abnormal-started on depakote  Neurology and psychiatry signed off   Continue depakote  Continue home medications  Continue potassium supplementation  Encouraged p.o. free water intake and continue to monitor sodium levels-we will hold off on further hypotonic IV fluids for now        DVT Prophylaxis [x] Lovenox  []  Heparin [] DOAC [] PCDs [] Ambulation    GI Prophylaxis [] PPI  [] H2 Blocker   [] Carafate  [x] Diet/Tube Feeds   Level of care [] Med/Surg  [x] Intermediate  []  ICU   Diet ADULT DIET; Regular    Family contact [x]  N/A    [] At bedside  [] Phone call   Disposition Patient requires continued admission further evaluation of altered mental status   MDM [x] Low    [] Moderate  []   High       Discharge Plan: Plan for return to [facility] upon discharge-we will plan for discharge when sodium levels improved- anticipate tomorrow or Monday.    +++++++++++++++++++++++++++++++++++++++++++++++++  [address], [place]  +++++++++++++++++++++++++++++++++++++++++++++++++  NOTE: This report was transcribed using voice recognition software. Every effort was made to ensure accuracy; however, inadvertent computerized transcription errors may be present.

## 2022-10-08 NOTE — PLAN OF CARE
Problem: Chronic Conditions and Co-morbidities  Goal: Patient's chronic conditions and co-morbidity symptoms are monitored and maintained or improved  Outcome: Progressing     Problem: Discharge Planning  Goal: Discharge to home or other facility with appropriate resources  Outcome: Progressing     Problem: Skin/Tissue Integrity  Goal: Absence of new skin breakdown  Description: 1. Monitor for areas of redness and/or skin breakdown  2. Assess vascular access sites hourly  3. Every 4-6 hours minimum:  Change oxygen saturation probe site  4. Every 4-6 hours:  If on nasal continuous positive airway pressure, respiratory therapy assess nares and determine need for appliance change or resting period.   Outcome: Completed     Problem: Safety - Adult  Goal: Free from fall injury  Outcome: Completed     Problem: ABCDS Injury Assessment  Goal: Absence of physical injury  Outcome: Completed

## 2022-10-09 PROCEDURE — 6370000000 HC RX 637 (ALT 250 FOR IP): Performed by: FAMILY MEDICINE

## 2022-10-09 PROCEDURE — 6360000002 HC RX W HCPCS: Performed by: INTERNAL MEDICINE

## 2022-10-09 PROCEDURE — 6370000000 HC RX 637 (ALT 250 FOR IP): Performed by: PSYCHIATRY & NEUROLOGY

## 2022-10-09 PROCEDURE — 6360000002 HC RX W HCPCS: Performed by: FAMILY MEDICINE

## 2022-10-09 PROCEDURE — 6370000000 HC RX 637 (ALT 250 FOR IP)

## 2022-10-09 PROCEDURE — 6370000000 HC RX 637 (ALT 250 FOR IP): Performed by: NURSE PRACTITIONER

## 2022-10-09 PROCEDURE — 1200000000 HC SEMI PRIVATE

## 2022-10-09 RX ORDER — HALOPERIDOL 5 MG/ML
2 INJECTION INTRAMUSCULAR ONCE
Status: COMPLETED | OUTPATIENT
Start: 2022-10-09 | End: 2022-10-09

## 2022-10-09 RX ORDER — DIVALPROEX SODIUM 125 MG/1
250 CAPSULE, COATED PELLETS ORAL EVERY 8 HOURS SCHEDULED
Qty: 60 CAPSULE | Refills: 3 | Status: ON HOLD | DISCHARGE
Start: 2022-10-09 | End: 2022-10-18 | Stop reason: HOSPADM

## 2022-10-09 RX ADMIN — DIVALPROEX SODIUM 250 MG: 125 CAPSULE, COATED PELLETS ORAL at 06:12

## 2022-10-09 RX ADMIN — ANTI-FUNGAL POWDER MICONAZOLE NITRATE TALC FREE: 1.42 POWDER TOPICAL at 09:25

## 2022-10-09 RX ADMIN — HALOPERIDOL LACTATE 2 MG: 5 INJECTION, SOLUTION INTRAMUSCULAR at 22:09

## 2022-10-09 RX ADMIN — ANTI-FUNGAL POWDER MICONAZOLE NITRATE TALC FREE: 1.42 POWDER TOPICAL at 20:44

## 2022-10-09 RX ADMIN — ATORVASTATIN CALCIUM 80 MG: 40 TABLET, FILM COATED ORAL at 20:44

## 2022-10-09 RX ADMIN — Medication 5 MG: at 18:11

## 2022-10-09 RX ADMIN — ENOXAPARIN SODIUM 40 MG: 100 INJECTION SUBCUTANEOUS at 09:17

## 2022-10-09 RX ADMIN — ENALAPRIL MALEATE 10 MG: 5 TABLET ORAL at 09:18

## 2022-10-09 RX ADMIN — METOPROLOL SUCCINATE 50 MG: 25 TABLET, FILM COATED, EXTENDED RELEASE ORAL at 09:18

## 2022-10-09 RX ADMIN — POTASSIUM CHLORIDE 20 MEQ: 20 TABLET, EXTENDED RELEASE ORAL at 08:43

## 2022-10-09 RX ADMIN — DIVALPROEX SODIUM 250 MG: 125 CAPSULE, COATED PELLETS ORAL at 14:37

## 2022-10-09 RX ADMIN — PETROLATUM: 420 OINTMENT TOPICAL at 09:25

## 2022-10-09 RX ADMIN — POTASSIUM CHLORIDE 20 MEQ: 20 TABLET, EXTENDED RELEASE ORAL at 18:11

## 2022-10-09 NOTE — DISCHARGE SUMMARY
Hospitalist Discharge Summary    Patient ID: Ravi Khan   Patient : 1960  Patient's PCP: Moreno Tobin DO    Admit Date: 10/3/2022   Admitting Physician: Juan Diego Escamilla DO    Discharge Date:  10/9/2022  Discharge Physician: REAGAN Bellamy CNP   Discharge Condition: Stable  Discharge Disposition: [address]    History of presenting illness/hospital course:    Briefly, patient with history of craniotomy for excision of meningioma, from local nursing facility with mentation baseline of alert and oriented x1 presented to emergency department for altered mentation on 10/3/22. Per chart review, allegedly patient has been under more stress due to divorce proceedings are finalized recently. Labs and imaging were unremarkable, inpatient psych not believe this was a behavioral issue. Attempts were made to have patient return to facility and they refused until she was further evaluated from a neurological standpoint. CT of the brain shows encephalomalacia of the right Mca and left PCA territories. Qtc ws prolonged at 614 but has improved to 468. EEG ws performed that showed a potential for focal seizures from the right temporal region and moderate nonspecific cerebral dysfunction of the right temporal region. Started on depakote as per neurology. Neurology is concerned this may be her new baseline. Psych evaluated and agrees- recommends to continue depakote as well. On 10/7 her serum sodium increased to 151 mmol/L and she was started on half-normal saline. Her sodium level improved with the hypotonic solution and encouraged PO fluid intake. She is stable for discharge to the nursing facility. Consults:   IP CONSULT TO SOCIAL WORK  IP CONSULT TO INTERNAL MEDICINE  IP CONSULT TO NEUROLOGY  IP CONSULT TO PSYCHIATRY  IP CONSULT TO CASE MANAGEMENT    Discharge Diagnoses:    Assessment/plan:   Altered mental status (questionable) patient's baseline is alert and oriented x1) possibly secondary to post ictal psychosis- evaluated by psych and neuro who feel that this is patients baseline due to her hx of neurologic trauma  History of craniotomy for excision of meningioma  Potential seizure focus noted on EEG in right temporal region- keppra changed to depakote per neurology  Agitation- psych signed off  Hallucinations- psych signed off  Hypertension- controlled with enalapril and metoprolol succinate  Hypokalemia- resolved with daily supplementation  Hypernatremia- resolved   Candidiasis of the left groin- miconazole powder to continue     Discharge Instructions / Follow up:    Medications as prescribed. Follow up with PCP within 2 weeks. Activity: activity as tolerated    Significant labs:  CBC:   Recent Labs     10/07/22  0454 10/08/22  0558   WBC 8.8 8.4   RBC 4.58 4.67   HGB 13.5 14.1   HCT 41.8 43.5   MCV 91.3 93.1   RDW 13.0 13.2    263     BMP:   Recent Labs     10/07/22  1652 10/08/22  0558 10/08/22  1748    148*  148* 142   K 3.6 4.0  4.1 3.8   * 109*  109* 105   CO2 25 30*  31* 24   BUN 13 15  15 14   CREATININE 0.6 0.7  0.7 0.7   MG 2.0  --   --      LFT:  Recent Labs     10/07/22  0454 10/08/22  0558   PROT 7.4 7.5   ALKPHOS 65 63   ALT 26 22   AST 39* 26   BILITOT 0.6 0.6     PT/INR: No results for input(s): INR, APTT in the last 72 hours. BNP: No results for input(s): BNP in the last 72 hours.   Hgb A1C:   Lab Results   Component Value Date    LABA1C 5.4 09/03/2020     Folate and B12: No results found for: Gia Cervantes, No results found for: FOLATE  Thyroid Studies:   Lab Results   Component Value Date    TSH 1.552 10/12/2010       Urinalysis:    Lab Results   Component Value Date/Time    NITRU Negative 10/03/2022 12:22 PM    WBCUA 5-10 10/03/2022 12:22 PM    BACTERIA NONE SEEN 10/03/2022 12:22 PM    RBCUA NONE 10/03/2022 12:22 PM    BLOODU Negative 10/03/2022 12:22 PM    SPECGRAV 1.025 10/03/2022 12:22 PM    GLUCOSEU Negative 10/03/2022 12:22 PM Imaging:  CT HEAD WO CONTRAST    Result Date: 10/3/2022  EXAMINATION: CT OF THE HEAD WITHOUT CONTRAST  10/3/2022 3:34 pm TECHNIQUE: CT of the head was performed without the administration of intravenous contrast. Automated exposure control, iterative reconstruction, and/or weight based adjustment of the mA/kV was utilized to reduce the radiation dose to as low as reasonably achievable. COMPARISON: None. HISTORY: ORDERING SYSTEM PROVIDED HISTORY: altered mental status TECHNOLOGIST PROVIDED HISTORY: Reason for exam:->altered mental status Has a \"code stroke\" or \"stroke alert\" been called? ->No Decision Support Exception - unselect if not a suspected or confirmed emergency medical condition->Emergency Medical Condition (MA) What reading provider will be dictating this exam?->CRC FINDINGS: BRAIN/VENTRICLES: There is no acute intracranial hemorrhage, mass effect or midline shift. No abnormal extra-axial fluid collection. The gray-white differentiation is maintained without evidence of an acute infarct. There is no evidence of hydrocephalus. Changes of encephalomalacia right MCA and left PCA territories. Basilar tip aneurysm coil noted. ORBITS: The visualized portion of the orbits demonstrate no acute abnormality. SINUSES: The visualized paranasal sinuses and mastoid air cells demonstrate no acute abnormality. SOFT TISSUES/SKULL:  No acute abnormality of the visualized skull or soft tissues. Left posterior parietooccipital craniotomy. No acute intracranial abnormality. Changes of encephalomalacia right MCA and left PCA territories. Discharge Medications:      Medication List        START taking these medications      divalproex 125 MG capsule  Commonly known as: DEPAKOTE SPRINKLE  Take 2 capsules by mouth every 8 hours     miconazole 2 % powder  Commonly known as: MICOTIN  Apply topically 2 times daily.             CONTINUE taking these medications      atorvastatin 80 MG tablet  Commonly known as: LIPITOR  Take 1 tablet by mouth nightly     CALCIUM + D PO     CO Q 10 PO     enalapril 10 MG tablet  Commonly known as: VASOTEC  Take 1 tablet by mouth daily     FISH OIL PO     KRILL OIL PO     magnesium oxide 400 (241.3 Mg) MG Tabs tablet  Commonly known as: MAG-OX  Take 1 tablet by mouth daily     metoprolol succinate 50 MG extended release tablet  Commonly known as: TOPROL XL  Take 1 tablet by mouth daily     MULTIVITAMINS PO     potassium bicarb-citric acid 20 MEQ Tbef effervescent tablet  Commonly known as: EFFER-K  Take 1 tablet by mouth daily     vitamin C 500 MG tablet  Commonly known as: ASCORBIC ACID            STOP taking these medications      levETIRAcetam 500 MG tablet  Commonly known as: KEPPRA               Where to Get Your Medications        Information about where to get these medications is not yet available    Ask your nurse or doctor about these medications  divalproex 125 MG capsule  miconazole 2 % powder         Time Spent on discharge is more than 35 minutes in the examination, evaluation, counseling and review of medications and discharge plan.    +++++++++++++++++++++++++++++++++++++++++++++++++  REAGAN Hamilton - CNP [facility] - Hospitalist  [address], [place]  +++++++++++++++++++++++++++++++++++++++++++++++++  NOTE: This report was transcribed using voice recognition software. Every effort was made to ensure accuracy; however, inadvertent computerized transcription errors may be present.

## 2022-10-09 NOTE — PROGRESS NOTES
Hospitalist Progress Note      Synopsis: Briefly, patient with history of craniotomy for excision of meningioma, from local nursing facility with mentation baseline of alert and oriented x1 presented to emergency department for altered mentation on 10/3/22. Per chart review, allegedly patient has been under more stress due to divorce proceedings are finalized recently. Labs and imaging were unremarkable, inpatient psych not believe this was a behavioral issue. Attempts were made to have patient return to facility and they refused until she was further evaluated from a neurological standpoint. CT of the brain shows encephalomalacia of the right Mca and left PCA territories. Qtc ws prolonged at 614 but has improved to 468. EEG ws performed that showed a potential for focal seizures from the right temporal region and moderate nonspecific cerebral dysfunction of the right temporal region. Started on depakote as per neurology. Neurology is concerned this may be her new baseline. On 10/7 her serum sodium increased to 151 mmol/L and she was started on half-normal saline. Her sodium level improved with the hypotonic solution and encouraged PO fluid intake. She is stable for discharge to the nursing facility however must be sitter free x 24 hours prior to discharge    Subjective:    Seen sitting up in bed in no distress. She states that she is pregnant with twins and preparing to birth them. Temp (24hrs), Av.6 °F (36.4 °C), Min:97.2 °F (36.2 °C), Max:97.8 °F (36.6 °C)    Objective:    /62   Pulse 63   Temp 97.8 °F (36.6 °C) (Temporal)   Resp 16   Ht 5' 4\" (1.626 m)   Wt 205 lb (93 kg)   SpO2 93%   BMI 35.19 kg/m²     General appearance: No apparent distress, appears stated age and cooperative. HEENT: Conjunctivae/corneas clear. Mucous membranes moist.  Neck: Supple. No JVD. Respiratory:  Clear to auscultation bilaterally. Normal respiratory effort. Cardiovascular:  RRR.  S1, S2 without MRG. PV: Pulses palpable. No edema. Abdomen: Soft, non-tender, non-distended. +BS  Musculoskeletal: No obvious deformities. Skin: Normal skin color. No rashes or lesions. Good turgor. Neurologic:  Grossly non-focal. Awake, alert, following commands. Psychiatric: Alert person only not to place and time, thought content inappropriate, abnormal insight and judgement    Medications:  REVIEWED DAILY    Infusion Medications    sodium chloride       Scheduled Medications    miconazole   Topical BID    haloperidol lactate  2 mg IntraMUSCular Once    atorvastatin  80 mg Oral Nightly    enalapril  10 mg Oral Daily    metoprolol succinate  50 mg Oral Daily    sodium chloride flush  10 mL IntraVENous 2 times per day    enoxaparin  40 mg SubCUTAneous Daily    potassium chloride  20 mEq Oral BID WC    divalproex  250 mg Oral 3 times per day    melatonin  5 mg Oral Daily     PRN Meds: white petrolatum, sodium chloride flush, sodium chloride, polyethylene glycol, acetaminophen **OR** acetaminophen    Labs:     Recent Labs     10/07/22  0454 10/08/22  0558   WBC 8.8 8.4   HGB 13.5 14.1   HCT 41.8 43.5    263         Recent Labs     10/07/22  1652 10/08/22  0558 10/08/22  1748    148*  148* 142   K 3.6 4.0  4.1 3.8   * 109*  109* 105   CO2 25 30*  31* 24   BUN 13 15  15 14   CREATININE 0.6 0.7  0.7 0.7   CALCIUM 9.4 9.2  9.0 8.9         Recent Labs     10/07/22  0454 10/08/22  0558   PROT 7.4 7.5   ALKPHOS 65 63   ALT 26 22   AST 39* 26   BILITOT 0.6 0.6         No results for input(s): INR in the last 72 hours. No results for input(s): Aileen Pineda in the last 72 hours. Chronic labs:    Lab Results   Component Value Date    CHOL 182 08/24/2020    TRIG 133 08/24/2020    HDL 44 08/24/2020    LDLCALC 111 (H) 08/24/2020    TSH 1.552 10/12/2010    INR 1.2 09/08/2020    LABA1C 5.4 09/03/2020       Radiology: REVIEWED DAILY    Assessment/plan:   Altered mental status (questionable) patient's baseline is alert and oriented x1) possibly secondary to post ictal psychosis- evaluated by psych and neuro who feel that this is patients baseline due to her hx of neurologic trauma  History of craniotomy for excision of meningioma  Potential seizure focus noted on EEG in right temporal region- keppra changed to depakote per neurology  Agitation- psych signed off  Hallucinations- psych signed off  Hypertension- controlled with enalapril and metoprolol succinate  Hypokalemia- resolved with daily supplementation  Hypernatremia- resolved   Candidiasis of the left groin- miconazole powder to continue         DVT Prophylaxis [x] Lovenox  []  Heparin [] DOAC [] PCDs [] Ambulation    GI Prophylaxis [] PPI  [] H2 Blocker   [] Carafate  [x] Diet/Tube Feeds   Level of care [] Med/Surg  [x] Intermediate  []  ICU   Diet ADULT DIET; Regular    Family contact [x]  N/A    [] At bedside  [] Phone call   Disposition Patient requires continued admission further evaluation of altered mental status   MDM [x] Low    [] Moderate  []   High       Discharge Plan: Plan for return to [facility] upon discharge- will need to be sitter free x 24 hours    +++++++++++++++++++++++++++++++++++++++++++++++++  [address], [place]  +++++++++++++++++++++++++++++++++++++++++++++++++  NOTE: This report was transcribed using voice recognition software. Every effort was made to ensure accuracy; however, inadvertent computerized transcription errors may be present.

## 2022-10-09 NOTE — CARE COORDINATION
10/9 Care Coordination:CM called by unit to notify of discharge. Pt still has a sitter in the room. Needs to be sitter free for 24 hrs. Call out to Clau Morelia to confirm. Unit notified, need to discontinue sitter. CM/SW will continue to follow for discharge planning.    Niki FRANCON,RN-CV-BC  [phone]

## 2022-10-09 NOTE — PROGRESS NOTES
I called Wenyd Mariee ([phone]) regarding discharge order placed today. Patient is supposed to return to [facility].

## 2022-10-10 ENCOUNTER — HOSPITAL ENCOUNTER (INPATIENT)
Age: 62
LOS: 8 days | Discharge: HOME | DRG: 753 | End: 2022-10-18
Attending: PSYCHIATRY & NEUROLOGY | Admitting: PSYCHIATRY & NEUROLOGY
Payer: MEDICAID

## 2022-10-10 VITALS
TEMPERATURE: 97.8 F | HEART RATE: 67 BPM | RESPIRATION RATE: 16 BRPM | BODY MASS INDEX: 35 KG/M2 | DIASTOLIC BLOOD PRESSURE: 84 MMHG | WEIGHT: 205 LBS | OXYGEN SATURATION: 97 % | HEIGHT: 64 IN | SYSTOLIC BLOOD PRESSURE: 160 MMHG

## 2022-10-10 PROBLEM — F23 ACUTE PSYCHOSIS (HCC): Status: ACTIVE | Noted: 2022-10-10

## 2022-10-10 LAB
EKG ATRIAL RATE: 62 BPM
EKG P AXIS: 52 DEGREES
EKG P-R INTERVAL: 186 MS
EKG Q-T INTERVAL: 418 MS
EKG QRS DURATION: 68 MS
EKG QTC CALCULATION (BAZETT): 424 MS
EKG R AXIS: 7 DEGREES
EKG T AXIS: 71 DEGREES
EKG VENTRICULAR RATE: 62 BPM
SARS-COV-2, NAAT: NOT DETECTED

## 2022-10-10 PROCEDURE — 6370000000 HC RX 637 (ALT 250 FOR IP): Performed by: STUDENT IN AN ORGANIZED HEALTH CARE EDUCATION/TRAINING PROGRAM

## 2022-10-10 PROCEDURE — 6370000000 HC RX 637 (ALT 250 FOR IP): Performed by: NURSE PRACTITIONER

## 2022-10-10 PROCEDURE — 6370000000 HC RX 637 (ALT 250 FOR IP): Performed by: FAMILY MEDICINE

## 2022-10-10 PROCEDURE — 1240000000 HC EMOTIONAL WELLNESS R&B

## 2022-10-10 PROCEDURE — 99231 SBSQ HOSP IP/OBS SF/LOW 25: CPT | Performed by: NURSE PRACTITIONER

## 2022-10-10 PROCEDURE — 6370000000 HC RX 637 (ALT 250 FOR IP): Performed by: PSYCHIATRY & NEUROLOGY

## 2022-10-10 PROCEDURE — 6360000002 HC RX W HCPCS: Performed by: FAMILY MEDICINE

## 2022-10-10 PROCEDURE — 6370000000 HC RX 637 (ALT 250 FOR IP)

## 2022-10-10 PROCEDURE — 87635 SARS-COV-2 COVID-19 AMP PRB: CPT

## 2022-10-10 PROCEDURE — 93005 ELECTROCARDIOGRAM TRACING: CPT | Performed by: NURSE PRACTITIONER

## 2022-10-10 RX ORDER — METOPROLOL SUCCINATE 25 MG/1
50 TABLET, EXTENDED RELEASE ORAL DAILY
Status: DISCONTINUED | OUTPATIENT
Start: 2022-10-11 | End: 2022-10-18 | Stop reason: HOSPADM

## 2022-10-10 RX ORDER — DIVALPROEX SODIUM 125 MG/1
250 CAPSULE, COATED PELLETS ORAL EVERY 8 HOURS SCHEDULED
Status: DISCONTINUED | OUTPATIENT
Start: 2022-10-10 | End: 2022-10-11

## 2022-10-10 RX ORDER — MECOBALAMIN 5000 MCG
5 TABLET,DISINTEGRATING ORAL DAILY
Status: COMPLETED | OUTPATIENT
Start: 2022-10-10 | End: 2022-10-14

## 2022-10-10 RX ORDER — MAGNESIUM HYDROXIDE/ALUMINUM HYDROXICE/SIMETHICONE 120; 1200; 1200 MG/30ML; MG/30ML; MG/30ML
30 SUSPENSION ORAL PRN
Status: DISCONTINUED | OUTPATIENT
Start: 2022-10-10 | End: 2022-10-18 | Stop reason: HOSPADM

## 2022-10-10 RX ORDER — OLANZAPINE 5 MG/1
2.5 TABLET, ORALLY DISINTEGRATING ORAL 2 TIMES DAILY
Status: DISCONTINUED | OUTPATIENT
Start: 2022-10-10 | End: 2022-10-10 | Stop reason: HOSPADM

## 2022-10-10 RX ORDER — METOPROLOL SUCCINATE 50 MG/1
50 TABLET, EXTENDED RELEASE ORAL DAILY
Status: CANCELLED | OUTPATIENT
Start: 2022-10-11

## 2022-10-10 RX ORDER — ATORVASTATIN CALCIUM 40 MG/1
80 TABLET, FILM COATED ORAL NIGHTLY
Status: CANCELLED | OUTPATIENT
Start: 2022-10-10

## 2022-10-10 RX ORDER — ACETAMINOPHEN 325 MG/1
650 TABLET ORAL EVERY 4 HOURS PRN
Status: DISCONTINUED | OUTPATIENT
Start: 2022-10-10 | End: 2022-10-18 | Stop reason: HOSPADM

## 2022-10-10 RX ORDER — DIVALPROEX SODIUM 125 MG/1
250 CAPSULE, COATED PELLETS ORAL EVERY 8 HOURS SCHEDULED
Status: CANCELLED | OUTPATIENT
Start: 2022-10-10

## 2022-10-10 RX ORDER — NICOTINE 21 MG/24HR
1 PATCH, TRANSDERMAL 24 HOURS TRANSDERMAL DAILY
Status: DISCONTINUED | OUTPATIENT
Start: 2022-10-10 | End: 2022-10-18 | Stop reason: HOSPADM

## 2022-10-10 RX ORDER — ENALAPRIL MALEATE 10 MG/1
10 TABLET ORAL DAILY
Status: CANCELLED | OUTPATIENT
Start: 2022-10-11

## 2022-10-10 RX ORDER — ENALAPRIL MALEATE 10 MG/1
10 TABLET ORAL DAILY
Status: DISCONTINUED | OUTPATIENT
Start: 2022-10-11 | End: 2022-10-18 | Stop reason: HOSPADM

## 2022-10-10 RX ORDER — ATORVASTATIN CALCIUM 40 MG/1
80 TABLET, FILM COATED ORAL NIGHTLY
Status: DISCONTINUED | OUTPATIENT
Start: 2022-10-10 | End: 2022-10-18 | Stop reason: HOSPADM

## 2022-10-10 RX ORDER — MECOBALAMIN 5000 MCG
5 TABLET,DISINTEGRATING ORAL DAILY
Status: CANCELLED | OUTPATIENT
Start: 2022-10-10 | End: 2022-10-15

## 2022-10-10 RX ORDER — OLANZAPINE 5 MG/1
2.5 TABLET, ORALLY DISINTEGRATING ORAL 2 TIMES DAILY
Status: DISCONTINUED | OUTPATIENT
Start: 2022-10-10 | End: 2022-10-11

## 2022-10-10 RX ORDER — HALOPERIDOL 2 MG/1
3 TABLET ORAL EVERY 6 HOURS PRN
Status: DISCONTINUED | OUTPATIENT
Start: 2022-10-10 | End: 2022-10-18 | Stop reason: HOSPADM

## 2022-10-10 RX ORDER — POLYETHYLENE GLYCOL 3350 17 G/17G
17 POWDER, FOR SOLUTION ORAL DAILY PRN
Status: CANCELLED | OUTPATIENT
Start: 2022-10-10

## 2022-10-10 RX ORDER — HYDROXYZINE PAMOATE 50 MG/1
50 CAPSULE ORAL 3 TIMES DAILY PRN
Status: DISCONTINUED | OUTPATIENT
Start: 2022-10-10 | End: 2022-10-18 | Stop reason: HOSPADM

## 2022-10-10 RX ORDER — OLANZAPINE 5 MG/1
2.5 TABLET, ORALLY DISINTEGRATING ORAL 2 TIMES DAILY
Status: CANCELLED | OUTPATIENT
Start: 2022-10-10

## 2022-10-10 RX ORDER — POLYETHYLENE GLYCOL 3350 17 G/17G
17 POWDER, FOR SOLUTION ORAL DAILY PRN
Status: DISCONTINUED | OUTPATIENT
Start: 2022-10-10 | End: 2022-10-18 | Stop reason: HOSPADM

## 2022-10-10 RX ORDER — POTASSIUM CHLORIDE 20 MEQ/1
20 TABLET, EXTENDED RELEASE ORAL 2 TIMES DAILY WITH MEALS
Status: DISCONTINUED | OUTPATIENT
Start: 2022-10-11 | End: 2022-10-18 | Stop reason: HOSPADM

## 2022-10-10 RX ORDER — POTASSIUM CHLORIDE 20 MEQ/1
20 TABLET, EXTENDED RELEASE ORAL 2 TIMES DAILY WITH MEALS
Status: CANCELLED | OUTPATIENT
Start: 2022-10-10

## 2022-10-10 RX ORDER — BUSPIRONE HYDROCHLORIDE 10 MG/1
10 TABLET ORAL 3 TIMES DAILY
Status: DISCONTINUED | OUTPATIENT
Start: 2022-10-10 | End: 2022-10-10

## 2022-10-10 RX ORDER — PAROXETINE 10 MG/1
10 TABLET, FILM COATED ORAL DAILY
Status: DISCONTINUED | OUTPATIENT
Start: 2022-10-10 | End: 2022-10-10

## 2022-10-10 RX ORDER — HALOPERIDOL 5 MG/ML
3 INJECTION INTRAMUSCULAR EVERY 6 HOURS PRN
Status: DISCONTINUED | OUTPATIENT
Start: 2022-10-10 | End: 2022-10-18 | Stop reason: HOSPADM

## 2022-10-10 RX ADMIN — ENALAPRIL MALEATE 10 MG: 5 TABLET ORAL at 08:17

## 2022-10-10 RX ADMIN — Medication 5 MG: at 21:08

## 2022-10-10 RX ADMIN — ATORVASTATIN CALCIUM 80 MG: 40 TABLET, FILM COATED ORAL at 21:08

## 2022-10-10 RX ADMIN — ANTI-FUNGAL POWDER MICONAZOLE NITRATE TALC FREE: 1.42 POWDER TOPICAL at 08:18

## 2022-10-10 RX ADMIN — DIVALPROEX SODIUM 250 MG: 125 CAPSULE, COATED PELLETS ORAL at 14:04

## 2022-10-10 RX ADMIN — ENOXAPARIN SODIUM 40 MG: 100 INJECTION SUBCUTANEOUS at 08:19

## 2022-10-10 RX ADMIN — DIVALPROEX SODIUM 250 MG: 125 CAPSULE ORAL at 21:09

## 2022-10-10 RX ADMIN — OLANZAPINE 2.5 MG: 5 TABLET, ORALLY DISINTEGRATING ORAL at 21:08

## 2022-10-10 RX ADMIN — POTASSIUM CHLORIDE 20 MEQ: 20 TABLET, EXTENDED RELEASE ORAL at 17:04

## 2022-10-10 RX ADMIN — METOPROLOL SUCCINATE 50 MG: 25 TABLET, FILM COATED, EXTENDED RELEASE ORAL at 08:16

## 2022-10-10 RX ADMIN — OLANZAPINE 2.5 MG: 5 TABLET, ORALLY DISINTEGRATING ORAL at 14:04

## 2022-10-10 RX ADMIN — HYDROXYZINE PAMOATE 50 MG: 50 CAPSULE ORAL at 21:09

## 2022-10-10 RX ADMIN — POTASSIUM CHLORIDE 20 MEQ: 20 TABLET, EXTENDED RELEASE ORAL at 08:17

## 2022-10-10 NOTE — CARE COORDINATION
10/10/22  Transition of Care update. Patient was admitted for hallucinations and altered mental status. Patient is a resident at [facility] and the discharge goal is for her to return once medically stable. Call was placed [place] with the  and Dr. Tomy Connolly to discuss patients base line at the facility. The facility rep states patient does have a TBI and history of aneursm but she is normally alert and oriented. Patient has a history of hoarding and OCD behaviors at the facility. She is followed by Dr. Jass Batista and takes Buspar and Paxil at the facility. Facility states resident has had a significant change in behaviors with visual hallucinations and increased paranoia. . A prefect serve was sent to Psych per physician request requesting a re-evaluation of patient . AIMEE/SANTIAGO to follow for discharge planning.     Electronically signed by JUAN F Wagner on 10/10/2022 at 11:54 AM

## 2022-10-10 NOTE — CARE COORDINATION
10/10/22 Update CM Note: consult resent for psyche to re-evaluate patient as her hallucinations/and self harm per the skilled facility are new. Per the skilled facility patrient is normally alert and oriented. She knows her medications and can hold a conversation. She does have hoarding tendencies. She does have some stressors in her live such as an ongoing divorce. She does take buspar and paxil at the facility also. Dr Anson Owen here and spoke with the facility by phone to obtain the \"norm\" behavior for the patient. Elvia Valencia responded and will see the patient.  Electronically signed by Ismael Emanuel RN CM on 10/10/2022 at 11:47 AM

## 2022-10-10 NOTE — PROGRESS NOTES
Pt becoming extremely agitated and unable to be redirected. A&Ox1. Threatening staff that she will hit them. Having flight of ideas and is restless. Declines to take Depakote. RN reached out for PRN orders; one time order for Haldol 2mg IM received. Med given, pt made comfortable in bed.

## 2022-10-10 NOTE — ED NOTES
THE PT WAS ACCEPTED TO [facility]. ACCEPTING INFO GIVEN TO [facility]. DISPOSITION GIVEN TO ELENA IN ADMITTING.      Evonne Runner, [facility]  10/10/22 [id number]

## 2022-10-10 NOTE — CARE COORDINATION
10/10/22 Update CM Note: Patient is on general medical floor. She remains with agitation and aggressive behaviors. She has IM haldol x1 yesterday. She is on Depakote bid. She needs to be on her regular medications of buspar and prozac. Perfect serve sent to pcp. She can return to [facility] as a bedhold when she is medically stable.  Electronically signed by Beatriz Manning RN CM on 10/10/2022 at 9:43 AM

## 2022-10-10 NOTE — CONSULTS
Reason for consult: hallucinations/change in behavior/at risk for self harm       Consulting Physician: Dr Jene Runner       HPI  Patient assessed by Dr. Genna Simon, at this point she does meet criteria for inpatient psychiatric hospitalization. Patient has been pink slipped by the psychiatric team.  Please call the [facility] at extension [phone] for bed availability and admission to inpatient psych once the patient is medically stabilized for discharge from a medical perspective. MSE  Unable to complete for MSE patient is disoriented       DX  Acute psychosis         Plan/Recommendations: The patient case, plan of care and recommendations has been discussed with Dr Genna Simon and the collaborative psychiatric care team.     We do not recommend the use of paxil or buspar in this patient, as these medications may lead to increased agitation and further destabilize. Continue Depakote 250 mg 3 times daily for mood stabilization  Zyprexa 2.5 mg BID for augmentation of treatment. Patient assessed by Dr. Genna Simon, at this point she does meet criteria for inpatient psychiatric hospitalization. Patient has been pink slipped by the psychiatric team.  Please call the [facility] at extension [phone] for bed availability and admission to inpatient psych once the patient is medically stabilized for discharge from a medical perspective.     Thank you for the consult

## 2022-10-10 NOTE — PROGRESS NOTES
Hospitalist Progress Note      PCP: Mandie Gloria DO    Date of Admission: 10/3/2022    Chief Complaint: Psychosis/hallucinations    Hospital Course:   Briefly, patient with history of craniotomy for excision of meningioma, from local nursing facility with mentation baseline of alert and oriented x1 presented to emergency department for altered mentation on 10/3/22. Per chart review, allegedly patient has been under more stress due to divorce proceedings are finalized recently. Labs and imaging were unremarkable, inpatient psych not believe this was a behavioral issue. Attempts were made to have patient return to facility and they refused until she was further evaluated from a neurological standpoint. CT of the brain shows encephalomalacia of the right Mca and left PCA territories. Qtc ws prolonged at 614 but has improved to 468. EEG ws performed that showed a potential for focal seizures from the right temporal region and moderate nonspecific cerebral dysfunction of the right temporal region. Started on depakote as per neurology. Neurology is concerned this may be her new baseline. On 10/7 her serum sodium increased to 151 mmol/L and she was started on half-normal saline. Her sodium level improved with the hypotonic solution and encouraged PO fluid intake, but patient continued to persistent hallucinations, behavioral dysfunction,     Subjective: During my evaluation in the room patient appeared to have visual hallucinations, trail of thought, able to reorient slightly but goes on a tangent immediately. Mentions Gold Lighter is out to kill her and she needs to protect herself. \"  Patient is at great risk for self-harm after discussing with the nursing, facility.   Discussed with  /  reconsulted psychiatric  Received a dose of IM Haldol yesterday    Medications:  Reviewed    Infusion Medications    sodium chloride       Scheduled Medications    busPIRone  10 mg Oral TID PARoxetine  10 mg Oral Daily    miconazole   Topical BID    haloperidol lactate  2 mg IntraMUSCular Once    atorvastatin  80 mg Oral Nightly    enalapril  10 mg Oral Daily    metoprolol succinate  50 mg Oral Daily    sodium chloride flush  10 mL IntraVENous 2 times per day    enoxaparin  40 mg SubCUTAneous Daily    potassium chloride  20 mEq Oral BID     divalproex  250 mg Oral 3 times per day    melatonin  5 mg Oral Daily     PRN Meds: white petrolatum, sodium chloride flush, sodium chloride, polyethylene glycol, acetaminophen **OR** acetaminophen      Intake/Output Summary (Last 24 hours) at 10/10/2022 1218  Last data filed at 10/10/2022 0834  Gross per 24 hour   Intake 540 ml   Output --   Net 540 ml       Exam:    /66   Pulse 60   Temp 97.8 °F (36.6 °C) (Temporal)   Resp 18   Ht 5' 4\" (1.626 m)   Wt 205 lb (93 kg)   SpO2 98%   BMI 35.19 kg/m²     General appearance:   HEENT: Pupils equal, round, and reactive to light. Conjunctivae/corneas clear. Neck: Supple, with full range of motion. No jugular venous distention. Trachea midline. Respiratory:  Normal respiratory effort. Clear to auscultation, bilaterally without Rales/Wheezes/Rhonchi. Cardiovascular: Regular rate and rhythm with normal S1/S2 without murmurs, rubs or gallops. Abdomen: Soft, non-tender, non-distended with normal bowel sounds. Musculoskeletal: No clubbing, cyanosis or edema bilaterally. Full range of motion without deformity. Skin: Skin color, texture, turgor normal.  No rashes or lesions. Neurologic:  No new focal deficits.   Psychiatric: Oriented to self, thought content inappropriate, abnormal insight, visual, auditory hallucinations, at risk for self-harm    Labs:   Recent Labs     10/08/22  0558   WBC 8.4   HGB 14.1   HCT 43.5        Recent Labs     10/07/22  1652 10/08/22  0558 10/08/22  1748    148*  148* 142   K 3.6 4.0  4.1 3.8   * 109*  109* 105   CO2 25 30*  31* 24   BUN 13 15  15 14 CREATININE 0.6 0.7  0.7 0.7   CALCIUM 9.4 9.2  9.0 8.9     Recent Labs     10/08/22  0558   AST 26   ALT 22   BILITOT 0.6   ALKPHOS 63     No results for input(s): INR in the last 72 hours. No results for input(s): Mary Florian in the last 72 hours. 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 Discussed with facility who did not consider this to be the baseline. They would like patient to be on stable psychiatric medication regimen before discharge back to the facility. Reconsulted psychiatry. Restarted back home dose of paxil and buspirone after discussing with facility. History of craniotomy for excision of meningioma  Potential seizure focus noted on EEG in right temporal region- keppra changed to depakote per neurology  Hypertension- controlled with enalapril and metoprolol succinate  Hypokalemia- resolved with daily supplementation  Hypernatremia- resolved   Candidiasis of the left groin- miconazole powder to continue     Diet: ADULT DIET;  Regular  Code Status: Full Code    Dispo - await psychiatric mood stabilization    Bo oRn MD

## 2022-10-10 NOTE — PLAN OF CARE
Problem: Chronic Conditions and Co-morbidities  Goal: Patient's chronic conditions and co-morbidity symptoms are monitored and maintained or improved  10/10/2022 1807 by Corrine Cody RN  Outcome: Completed  10/10/2022 0855 by Corrine Cody RN  Outcome: Progressing  10/10/2022 0556 by Severiano Angst, RN  Outcome: Progressing     Problem: Discharge Planning  Goal: Discharge to home or other facility with appropriate resources  10/10/2022 1807 by Corrine Cody RN  Outcome: Completed  10/10/2022 0855 by Corrine Cody RN  Outcome: Progressing  10/10/2022 0556 by Severiano Angst, RN  Outcome: Progressing     Problem: Safety - Adult  Goal: Free from fall injury  10/10/2022 1807 by Corrine Cody RN  Outcome: Completed  10/10/2022 0855 by Corrine Cody RN  Outcome: Progressing  10/10/2022 0556 by Severiano Angst, RN  Outcome: Progressing

## 2022-10-10 NOTE — PLAN OF CARE
Problem: Chronic Conditions and Co-morbidities  Goal: Patient's chronic conditions and co-morbidity symptoms are monitored and maintained or improved  10/10/2022 0556 by Wayne Paulino RN  Outcome: Progressing  10/9/2022 1947 by eBverly Trujillo RN  Outcome: Progressing     Problem: Discharge Planning  Goal: Discharge to home or other facility with appropriate resources  10/10/2022 0556 by Wayne Paulino RN  Outcome: Progressing  10/9/2022 1947 by Beverly Trujillo RN  Outcome: Progressing     Problem: Safety - Adult  Goal: Free from fall injury  Outcome: Progressing

## 2022-10-10 NOTE — PLAN OF CARE
Problem: Chronic Conditions and Co-morbidities  Goal: Patient's chronic conditions and co-morbidity symptoms are monitored and maintained or improved  10/10/2022 0855 by José Miguel Johnson RN  Outcome: Progressing  10/10/2022 0556 by Jennifer Meza RN  Outcome: Progressing  10/9/2022 1947 by Matias Mccullough RN  Outcome: Progressing     Problem: Discharge Planning  Goal: Discharge to home or other facility with appropriate resources  10/10/2022 0855 by José Miguel Johnson RN  Outcome: Progressing  10/10/2022 0556 by Jennifer Meza RN  Outcome: Progressing  10/9/2022 1947 by Matias Mccullough RN  Outcome: Progressing     Problem: Safety - Adult  Goal: Free from fall injury  10/10/2022 0855 by José Miguel Johnson RN  Outcome: Progressing  10/10/2022 0556 by Jennifer Meza RN  Outcome: Progressing

## 2022-10-11 LAB
CHOLESTEROL, TOTAL: 118 MG/DL (ref 0–199)
HBA1C MFR BLD: 6 % (ref 4–5.6)
HDLC SERPL-MCNC: 33 MG/DL
LDL CHOLESTEROL CALCULATED: 59 MG/DL (ref 0–99)
TRIGL SERPL-MCNC: 128 MG/DL (ref 0–149)
VLDLC SERPL CALC-MCNC: 26 MG/DL

## 2022-10-11 PROCEDURE — 6360000002 HC RX W HCPCS: Performed by: PSYCHIATRY & NEUROLOGY

## 2022-10-11 PROCEDURE — 1240000000 HC EMOTIONAL WELLNESS R&B

## 2022-10-11 PROCEDURE — 6370000000 HC RX 637 (ALT 250 FOR IP): Performed by: NURSE PRACTITIONER

## 2022-10-11 PROCEDURE — 6370000000 HC RX 637 (ALT 250 FOR IP): Performed by: PSYCHIATRY & NEUROLOGY

## 2022-10-11 PROCEDURE — 36415 COLL VENOUS BLD VENIPUNCTURE: CPT

## 2022-10-11 PROCEDURE — 83036 HEMOGLOBIN GLYCOSYLATED A1C: CPT

## 2022-10-11 PROCEDURE — 90792 PSYCH DIAG EVAL W/MED SRVCS: CPT | Performed by: NURSE PRACTITIONER

## 2022-10-11 PROCEDURE — 6370000000 HC RX 637 (ALT 250 FOR IP): Performed by: STUDENT IN AN ORGANIZED HEALTH CARE EDUCATION/TRAINING PROGRAM

## 2022-10-11 PROCEDURE — 80061 LIPID PANEL: CPT

## 2022-10-11 PROCEDURE — 2500000003 HC RX 250 WO HCPCS: Performed by: STUDENT IN AN ORGANIZED HEALTH CARE EDUCATION/TRAINING PROGRAM

## 2022-10-11 RX ORDER — OLANZAPINE 5 MG/1
5 TABLET, ORALLY DISINTEGRATING ORAL 2 TIMES DAILY
Status: DISCONTINUED | OUTPATIENT
Start: 2022-10-11 | End: 2022-10-18 | Stop reason: HOSPADM

## 2022-10-11 RX ORDER — OLANZAPINE 2.5 MG/1
2.5 TABLET ORAL ONCE
Status: COMPLETED | OUTPATIENT
Start: 2022-10-11 | End: 2022-10-11

## 2022-10-11 RX ORDER — DIVALPROEX SODIUM 500 MG/1
500 TABLET, DELAYED RELEASE ORAL EVERY 12 HOURS SCHEDULED
Status: DISCONTINUED | OUTPATIENT
Start: 2022-10-11 | End: 2022-10-18 | Stop reason: HOSPADM

## 2022-10-11 RX ADMIN — Medication 5 MG: at 17:54

## 2022-10-11 RX ADMIN — ANTI-FUNGAL POWDER MICONAZOLE NITRATE TALC FREE: 1.42 POWDER TOPICAL at 09:39

## 2022-10-11 RX ADMIN — POTASSIUM CHLORIDE 20 MEQ: 1500 TABLET, EXTENDED RELEASE ORAL at 17:54

## 2022-10-11 RX ADMIN — POTASSIUM CHLORIDE 20 MEQ: 1500 TABLET, EXTENDED RELEASE ORAL at 09:36

## 2022-10-11 RX ADMIN — OLANZAPINE 2.5 MG: 2.5 TABLET, FILM COATED ORAL at 11:52

## 2022-10-11 RX ADMIN — HALOPERIDOL LACTATE 3 MG: 5 INJECTION, SOLUTION INTRAMUSCULAR at 10:50

## 2022-10-11 RX ADMIN — HYDROXYZINE PAMOATE 50 MG: 50 CAPSULE ORAL at 09:35

## 2022-10-11 RX ADMIN — OLANZAPINE 2.5 MG: 5 TABLET, ORALLY DISINTEGRATING ORAL at 09:36

## 2022-10-11 ASSESSMENT — SLEEP AND FATIGUE QUESTIONNAIRES
DO YOU HAVE DIFFICULTY SLEEPING: YES
DO YOU USE A SLEEP AID: YES
SLEEP PATTERN: DIFFICULTY FALLING ASLEEP
DO YOU USE A SLEEP AID: NO
DO YOU HAVE DIFFICULTY SLEEPING: YES
SLEEP PATTERN: DIFFICULTY FALLING ASLEEP;INSOMNIA;DISTURBED/INTERRUPTED SLEEP
DO YOU USE A SLEEP AID: YES
DO YOU HAVE DIFFICULTY SLEEPING: YES

## 2022-10-11 ASSESSMENT — LIFESTYLE VARIABLES
HOW OFTEN DO YOU HAVE A DRINK CONTAINING ALCOHOL: NEVER
HOW MANY STANDARD DRINKS CONTAINING ALCOHOL DO YOU HAVE ON A TYPICAL DAY: PATIENT DOES NOT DRINK

## 2022-10-11 NOTE — CARE COORDINATION
SW invited this pt to her group. Pt in her room, screaming nonsensically. Pt unable to attend group.

## 2022-10-11 NOTE — PLAN OF CARE
Problem: Xiomara  Goal: Will exhibit normal sleep and speech and no impulsivity  Description: INTERVENTIONS:  1. Administer medication as ordered  2. Set limits on impulsive behavior  3.  Make attempts to decrease external stimuli as possible  Outcome: Not Progressing     Patient's manic and is verbally abusive/ confused; talks to unseen others

## 2022-10-11 NOTE — BH NOTE
Patient resting in room. This nurse attempted to have patient sign paperwork and patient stated \"I'm not able. \" Patient then began yelling and stating \"no one listens to me and I'm forced to be here and my  threw my stuff out and I'm having my rights violated! \" Patient then stood up and immediately made a fist and started stating \"I can stand up and defend myself and I'll just start banging my head again! \" This nurse exited patients' room and patient yelled \"that's right, you better just leave me alone! \" Patient then laid down in her bed. Patient refused to sign any paperwork or answer any questions at this time.

## 2022-10-11 NOTE — PLAN OF CARE
Problem: Xiomara  Goal: Will exhibit normal sleep and speech and no impulsivity  Description: INTERVENTIONS:  1. Administer medication as ordered  2. Set limits on impulsive behavior  3. Make attempts to decrease external stimuli as possible  Outcome: Not Progressing     Problem: Psychosis  Goal: Will report no hallucinations or delusions  Description: INTERVENTIONS:  1. Administer medication as  ordered  2. Assist with reality testing to support increasing orientation  3. Assess if patient's hallucinations or delusions are encouraging self harm or harm to others and intervene as appropriate  Outcome: Not Progressing     Problem: Behavior  Goal: Pt/Family maintain appropriate behavior and adhere to behavioral management agreement, if implemented  Description: INTERVENTIONS:  1. Assess patient/family's coping skills and  non-compliant behavior (including use of illegal substances)  2. Notify security of behavior or suspected illegal substances which indicate the need for search of the family and/or belongings  3. Encourage verbalization of thoughts and concerns in a socially appropriate manner  4. Utilize positive, consistent limit setting strategies supporting safety of patient, staff and others  5. Encourage participation in the decision making process about the behavioral management agreement  6. If a visitor's behavior poses a threat to safety call refer to organization policy.   7. Initiate consult with , Psychosocial CNS, Spiritual Care as appropriate  Outcome: Not Progressing

## 2022-10-11 NOTE — CARE COORDINATION
AIMEE spoke with Juan Kuo [phone], liaison at [address] [place] [address]. Bradford Santillan states that this pt is able to return to the facility at discharge. Pt is a bed hold, and does not need a pre-cert prior to discharge. Pt does not need a COVID test prior to discharge. SW will continue to follow.

## 2022-10-11 NOTE — CARE COORDINATION
SW attempted to complete assessment. SW asked pt her name and pt stated \"I dont know what my real name is. \" SW asked pt if she is alert to where she is currently, pt stated \"I don't know where I am, I keep crashing and you will not give me a crash cart. \" Sw asked pt if  is able to call sister Deepak Eason. Pt stated \"my sister knows her real name, I will show you who the real Becka Gallardo is. \" Pt then began yelling, screaming and growling at . Pt became nonsensical. Pt is not alert and oriented and is unsure who she is or where she is. Pt is unsure of date/ year. Per chart pt has a history of a TBI, SW spoke with  supervisor who informed SW to gain information from pt's nursing facility and sister Deepak Eason who is pt's decision maker. SW called family and nursing home to obtain information. Deepak Eason -Sister - [phone]  [facility] Jose Francisco Chaudhary [phone]  Janifer Goldmann- Sister in [place]- [phone]     Biopsychosocial Assessment Note    Social work met with patient to complete the biopsychosocial assessment and C-SSRS. Chief Complaint: pt is unable to state reasoning she is currently in the hospital. Per [facility] note \" Pt is a 58 yo female who presents to the ED via ambulance due to a severely compromised mental status. Pt is on a pink slip by the ED doctor. Pt is unable to give a coherent narrative as to why she is here or what is going on with her. \"    Mental Status Exam: Pt is not alert and oriented. Pt is confused, paranoid, internally stimulated and delusional. Pt is unable to state current situation, who she is or where she is. Pt's speech is disorganized and loud. Pt was yelling at .  Pt is not calm or cooperative. Pt is agitated, labile and irritable. Pt's insight and judgement is poor. Pt's eye contact is poor. Pt is nonsensical. ROBERT SI, HI, AVH. Clinical Summary: Pt information is obtained from documentation, family and [address].     AIMEE spoke with Fiorella Horner at [facility] who stated that pt has a history of aneurisms, and a TBI. Annetta stated that the pt's TBI caused her to have a loss of vision. Pt also has cardiac problems, COPD, hypertension and hoarding. Pt has a history of anxiety and depression. Pt is recently going through a divorce which may have been the cause of pt's current behaviors and pt has a sister and brother. Annetta stated that pt hoards because growing up she did not have a lot and she does not allow anyone to take her things such as napkins, sugar packets, etc. Anjana Molina stated that she is unaware of any substance use and per previous H&P's completed at their facility there is no record of pt having any substance use. Annetta stated that at baseline pt is alert and oriented x3, she is able to give her medical history and life story. Annetta stated that pt is able to state the medications that she is on and the reason she is taking them. Annetta stated that pt does have a history of insomnia. SW spoke with pt's sister Yin Ling to obtain additional information, no information was given and SW explained the MANAN. Yin Ling stated that at pt's baseline her memory is good and she is able to recall events, appointments and dates. Sister stated that the pt has not had any MH hospitalizations prior to this admission but she has been getting MH medications from the . Sister stated that the pt is getting medicaid and she has applied for SSDI. 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 Sister stated that the pt was raised by her mom until mom passed when pt was 11 and dad who passed when pt was 7. Pt lived with sister until she was  when she was 23. Sister stated that pt was  for 39 years and she got  on September 28 which sister believes caused pt to decompensate.  Sister stated that pt has one sister and one brother and she had a brother who passed away. Sister is unsure of any previous abuse. Pt graduated from [employer]. Pt does have hoarding problems. Sister stated that she believes the pt's main stressors are her physical problems and her moms recent birthday and her divorce. Risk Factors: Pt has a decreased in mental status, pt has multiple chronic medical problems and concerns, pt has a TBI and 20% of eye sight, pt has increased confusion, pt has a lack of self care, pt recently got . Protective Factors: pt has access to basic needs, pt has safe and stable housing, pt has a physiatrist at , pt has good support from sister, brother and sister in law, pt has been compliant with medications, pt has no substance use, pt has no legal history.      Gender  [] Male [x] Female [] Transgender  [] Other    Sexual Orientation    [x] Heterosexual [] Homosexual [] Bisexual [] Other    Suicidal Ideation- ROBERT  [] Past [] Present [] Denies     C-SSRS Screening Completed: Current Suicide Risk:  [x] No Risk  [] Low [] Moderate [] High    Homicidal Ideation- ROBERT  [] Past [] Present [] Denies     Hallucinations/Delusions (Specify type)- ROBERT  [] Reports [] Denies     Current or Past Mental Health Treatment:  [x] Yes, When and Where: current at   [] No    Substance Use/Alcohol Use/Addiction  [] Reports [x] Denies     Tobacco Use (within the last 6 months)- ROBERT  [] Reports [] Denies     Trauma History- ROBERT  [] Reports [] Denies     Self Injurious/Self Mutilation Behaviors:   [] Reports:    [] Past [] Present   [] Denies    ROBERT    Legal History:  []  Yes (Specify)    [x] No    Collateral Contact (MANAN signed)  Name:   Relationship:  Number:     Collateral Information:      Access to Weapons per Collateral Contact: [] Reports [] Denies     After consideration of C-SSRS screening results, C-SSRS assessments, and this professional's assessment the patient's overall suicide risk assessed to be:  [] None   [x] Low due to unknown history   [] Moderate [] High     [x] Discussed current suicide risk, protective and risk factors with RN and NP/Psychiatrist.    Discharge Plan:  [] Home:  [] Shelter:  [] Crisis Unit:  [] Substance Abuse Rehab:  [x] Nursing Facility: [facility]  [] Other (Specify):     Follow up Provider: [address] [place] [address]

## 2022-10-11 NOTE — BH NOTE
Patient was given PRN Vistaril and PRN Haldol this shift, as well as a scheduled Zyprexa. Pt continues to scream nonsensical \"angry\" verb and will not allow staff to touch her; continues to refuse vitals. Continuing to monitor.

## 2022-10-11 NOTE — PROGRESS NOTES
Recreation assessment partially completed. Willing to answer most questions, before yelling/screaming nonsensically. Demanding multiple blankets, as she aggressively throws bedding onto floor.

## 2022-10-11 NOTE — DISCHARGE SUMMARY
Hospitalist Discharge Summary    Patient ID: Kaila Guillen   Patient : 1960  Patient's PCP: Rosario Leung DO    Admit Date: 10/10/2022   Admitting Physician: Balbina Vera MD    Discharge Date:  10/11/2022   Discharge Physician: Karen Harrison MD   Discharge Condition: Stable  Discharge Disposition: Inpatient [facility] course in brief:  (Please refer to daily progress notes for a comprehensive review of the hospitalization by requesting medical records)  Briefly, patient with history of craniotomy for excision of meningioma, from local nursing facility with mentation baseline of alert and oriented x1 presented to emergency department for altered mentation on 10/3/22. Per chart review, allegedly patient has been under more stress due to divorce proceedings are finalized recently. Labs and imaging were unremarkable, inpatient psych not believe this was a behavioral issue. Attempts were made to have patient return to facility and they refused until she was further evaluated from a neurological standpoint. CT of the brain shows encephalomalacia of the right Mca and left PCA territories. Qtc ws prolonged at 614 but has improved to 468. EEG ws performed that showed a potential for focal seizures from the right temporal region and moderate nonspecific cerebral dysfunction of the right temporal region. Started on depakote as per neurology. Neurology is concerned this may be her new baseline. On 10/7 her serum sodium increased to 151 mmol/L and she was started on half-normal saline. Her sodium level improved with the hypotonic solution and encouraged PO fluid intake, but patient continued to persistent hallucinations, behavioral dysfunction,  Reconsulted psychiatry- at this point she does meet criteria for inpatient psychiatric hospitalization.   Patient has been pink slipped by the psychiatric team. Deemed medically stable for DC        Consults:   Psych    Discharge Diagnoses:  Acute psychosis/hallucinations  History of craniotomy for excision of meningioma  Acute seizure disorder  Hypertension  Hypokalemia-resolved  Hypernatremia-resolved  Candidiasis of left groin      Discharge Instructions / Follow up: Follow-up with PCP within 1 week of discharge. Follow-up with consultants as indicated by them. Compliance with medications as prescribed on discharge. No future appointments. The patient's condition is stable. At this time the patient is without objective evidence of an acute process requiring continuing hospitalization or inpatient management. They are stable for discharge with outpatient follow-up. I have spoken with the patient and discussed the results of the current hospitalization, in addition to providing specific details for the plan of care and counseling regarding the diagnosis and prognosis. The plan has been discussed in detail and they are aware of the specific conditions for emergent return, as well as the importance of follow-up. Their questions are answered at this time and they are agreeable with the plan for discharge to Inpatient psych. Continued appropriate risk factor modification of blood pressure, diabetes and serum lipids will remain essential to reducing risk of future atherosclerotic development    Activity: activity as tolerated    Physical exam:  General appearance: AAO x1 appears stated age and cooperative. HEENT: Conjunctivae/corneas clear. Mucous membranes moist.  Neck: Supple. No JVD. Respiratory:  Clear to auscultation bilaterally. Normal respiratory effort. Cardiovascular:  RRR. S1, S2 without MRG. PV: Pulses palpable. No edema. Abdomen: Soft, non-tender, non-distended. +BS  Musculoskeletal: No obvious deformities. Skin: Normal skin color. No rashes or lesions. Good turgor. Neurologic:  Grossly non-focal. Awake, alert, following commands.    Psychiatric: Oriented to self, thought content inappropriate, abnormal insight, visual, auditory hallucinations, at risk for self-harm    Significant labs:  CBC:   No results for input(s): WBC, RBC, HGB, HCT, MCV, RDW, PLT in the last 72 hours. BMP:   Recent Labs     10/08/22  1748      K 3.8      CO2 24   BUN 14   CREATININE 0.7     LFT:  No results for input(s): PROT, ALB, ALKPHOS, ALT, AST, BILITOT, AMYLASE, LIPASE in the last 72 hours. PT/INR: No results for input(s): INR, APTT in the last 72 hours. BNP: No results for input(s): BNP in the last 72 hours. Hgb A1C:   Lab Results   Component Value Date    LABA1C 5.4 09/03/2020     Folate and B12: No results found for: Metta Xenia, No results found for: FOLATE  Thyroid Studies:   Lab Results   Component Value Date    TSH 1.552 10/12/2010       Urinalysis:    Lab Results   Component Value Date/Time    NITRU Negative 10/03/2022 12:22 PM    WBCUA 5-10 10/03/2022 12:22 PM    BACTERIA NONE SEEN 10/03/2022 12:22 PM    RBCUA NONE 10/03/2022 12:22 PM    BLOODU Negative 10/03/2022 12:22 PM    Hildegard Abel 1.025 10/03/2022 12:22 PM    GLUCOSEU Negative 10/03/2022 12:22 PM       Imaging:  CT HEAD WO CONTRAST    Result Date: 10/3/2022  EXAMINATION: CT OF THE HEAD WITHOUT CONTRAST  10/3/2022 3:34 pm TECHNIQUE: CT of the head was performed without the administration of intravenous contrast. Automated exposure control, iterative reconstruction, and/or weight based adjustment of the mA/kV was utilized to reduce the radiation dose to as low as reasonably achievable. COMPARISON: None. HISTORY: ORDERING SYSTEM PROVIDED HISTORY: altered mental status TECHNOLOGIST PROVIDED HISTORY: Reason for exam:->altered mental status Has a \"code stroke\" or \"stroke alert\" been called? ->No Decision Support Exception - unselect if not a suspected or confirmed emergency medical condition->Emergency Medical Condition (MA) What reading provider will be dictating this exam?->CRC FINDINGS: BRAIN/VENTRICLES: There is no acute intracranial hemorrhage, mass effect or midline shift. No abnormal extra-axial fluid collection. The gray-white differentiation is maintained without evidence of an acute infarct. There is no evidence of hydrocephalus. Changes of encephalomalacia right MCA and left PCA territories. Basilar tip aneurysm coil noted. ORBITS: The visualized portion of the orbits demonstrate no acute abnormality. SINUSES: The visualized paranasal sinuses and mastoid air cells demonstrate no acute abnormality. SOFT TISSUES/SKULL:  No acute abnormality of the visualized skull or soft tissues. Left posterior parietooccipital craniotomy. No acute intracranial abnormality. Changes of encephalomalacia right MCA and left PCA territories. Discharge Medications:      Medication List        ASK your doctor about these medications      atorvastatin 80 MG tablet  Commonly known as: LIPITOR  Take 1 tablet by mouth nightly     CALCIUM + D PO     CO Q 10 PO     divalproex 125 MG capsule  Commonly known as: DEPAKOTE SPRINKLE  Take 2 capsules by mouth every 8 hours     enalapril 10 MG tablet  Commonly known as: VASOTEC  Take 1 tablet by mouth daily     FISH OIL PO     KRILL OIL PO     magnesium oxide 400 (241.3 Mg) MG Tabs tablet  Commonly known as: MAG-OX  Take 1 tablet by mouth daily     metoprolol succinate 50 MG extended release tablet  Commonly known as: TOPROL XL  Take 1 tablet by mouth daily     miconazole 2 % powder  Commonly known as: MICOTIN  Apply topically 2 times daily.      MULTIVITAMINS PO     potassium bicarb-citric acid 20 MEQ Tbef effervescent tablet  Commonly known as: EFFER-K  Take 1 tablet by mouth daily     vitamin C 500 MG tablet  Commonly known as: ASCORBIC ACID              Time Spent on discharge is more than 45 minutes in the examination, evaluation, counseling and review of medications and discharge plan.    +++++++++++++++++++++++++++++++++++++++++++++++++  MD Manjit Camilo [id number] [address]  +++++++++++++++++++++++++++++++++++++++++++++++++  NOTE: This report was transcribed using voice recognition software. Every effort was made to ensure accuracy; however, inadvertent computerized transcription errors may be present.

## 2022-10-11 NOTE — H&P
Department of Psychiatry  History and Physical - Adult     CHIEF COMPLAINT: Nonsensical, yelling, illogical     Patient was seen after discussing with the treatment team and reviewing the chart    CIRCUMSTANCES OF ADMISSION:   Patient was seen multiple times in consult, psychiatry was consulted for AMS and she was brought to the hospital for the same reason. She was followed by both neurology and psychiatry. The nursing home refused to accept the patient back, because she was not at her baseline level of functioning. She was assessed again by psychiatric services and she was pink slipped at that time to the inpatient psychiatric unit. HISTORY OF PRESENT ILLNESS:      The patient is a 57 y.o. female with significant past history of seizure, frontal meningioma, brain surgery, cognitive disorder who resides in nursing home due to level of function changes after her brain surgery. 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 Her behaviors became hyperactive, disorganized, agitated and nonsensical. The nursing home and the patients sister report this is a deviation from her baseline and the nursing home would not accept the patient back with out some psychiatric intervention to address her behavioral changes. She was medically cleared and pink slipped to inpatient psych for stabilization. On assessment today the patient is unable to have any linear or purposeful interaction with me. She is easily agitated and has required stat PRN medications due to severe agitation. She is yelling, screaming, nonsensical statements, demonstrating word salad.  She is extremely hyper and agitated she demonstrates no current ability to protect her health, safety or wellbeing. She is rambling, not redirectable, nonsensical. She is not able to provide any of her history and history has been obtained through chart review. Past psychiatric history:  Patient has history of depression and per chart hoarding behaviors. She had a past hospital admission for acute psychosis, however this ended up being organic in nature and the patient was found to have a brain tumor that required brain surgery. Personal, family, social history:  Patient resides in nursing home, is recently  Pt lived with sister until she was  when she was 23. Sister stated that pt was  for 39 years and she got  on September 28 which sister believes caused pt to decompensate. Sister stated that pt has one sister and one brother and she had a brother who passed away. Sister is unsure of any previous abuse. Pt graduated from [place] [id number]. Pt does have hoarding problems. Sister stated that she believes the pt's main stressors are her physical problems and her moms recent birthday and her divorce. Annetta stated that at baseline pt is alert and oriented x3, she is able to give her medical history and life story. Annetta stated that pt is able to state the medications that she is on and the reason she is taking them. Annetta stated that pt does have a history of insomnia. Past Medical History:        Diagnosis Date    Brain aneurysm     2009    Hypertension     Lumbar herniated disc        Medications Prior to Admission:   Medications Prior to Admission: divalproex (DEPAKOTE SPRINKLE) 125 MG capsule, Take 2 capsules by mouth every 8 hours  miconazole (MICOTIN) 2 % powder, Apply topically 2 times daily.   atorvastatin (LIPITOR) 80 MG tablet, Take 1 tablet by mouth nightly  enalapril (VASOTEC) 10 MG tablet, Take 1 tablet by mouth daily  metoprolol succinate (TOPROL XL) 50 MG extended release tablet, Take 1 tablet by mouth daily  magnesium oxide (MAG-OX) 400 (241.3 Mg) MG TABS tablet, Take 1 tablet by mouth daily  potassium bicarb-citric acid (EFFER-K) 20 MEQ TBEF effervescent tablet, Take 1 tablet by mouth daily  Multiple Vitamin (MULTIVITAMINS PO), Take by mouth  Calcium Citrate-Vitamin D (CALCIUM + D PO), Take by mouth  KRILL OIL PO, Take by mouth  Omega-3 Fatty Acids (FISH OIL PO), Take by mouth  Ascorbic Acid (VITAMIN C) 500 MG tablet, Take 500 mg by mouth daily  Coenzyme Q10 (CO Q 10 PO), Take by mouth    Past Surgical History:        Procedure Laterality Date    BRAIN SURGERY      CRANIOTOMY N/A 8/28/2020    CRANIOTOMY FRAMELESS STEREOTATIC FOR BRAIN TUMOR performed by Gardenia Will MD at [address] [facility]  9/8/2020    IR OCCLUSIVE OR EMBOL [facility] 9/8/2020 Yony Espinal MD [facility]    TONSILLECTOMY         Allergies:   Patient has no known allergies. Family History  No family history on file. EXAMINATION:    REVIEW OF SYSTEMS:    ROS:  [x] All negative/unchanged except if checked.  Explain positive(checked items) below:  [] Constitutional  [] Eyes  [] Ear/Nose/Mouth/Throat  [] Respiratory  [] CV  [] GI  []   [] Musculoskeletal  [] Skin/Breast  [] Neurological  [] Endocrine  [] Heme/Lymph  [] Allergic/Immunologic    Explanation:     Vitals:  BP (!) 172/80   Pulse 61   Temp 97.7 °F (36.5 °C) (Temporal)   Resp 16   SpO2 96%      Physical Examination:   Head: x  Atraumatic: x normocephalic  Skin and Mucosa        Moist x  Dry   Pale  x Normal   Neck:  Thyroid  Palpable   x  Not palpable   venus distention   adenopathy   Chest: x Clear   Rhonchi     Wheezing   CV:  xS1   xS2    xNo murmer   Abdomen:  x  Soft    Tender    Viceromegaly   Extremities:  x No Edema     Edema     Cranial Nerves Examination:   CN II:   xPupils are reactive to light  Pupils are non reactive to light  CN III, IV, VI:  xNo eye deviation    No diplopia or ptosis   CN V:    xFacial Sensation is intact     Facial Sensation is not intact CN IIIV:   x Hearing is normal to rubbing fingers   CN IX, X:     xNormal gag reflex and phonation   CN XI:   xShoulder shrug and neck rotation is normal  CNXII:    xTongue is midline no deviation or atrophy    Mental Status Examination:    Level of consciousness:  within normal limits   Appearance:  seated in bed  Behavior/Motor:  psychomotor agitation and agitated  Attitude toward examiner:  agitated   Speech:  rapid, loud, hyperverbal, and pressured   Mood: irritable and labile  Affect:  mood congruent  Thought processes:  incoherent   Thought content: Unable to assess  Cognition:  disoriented   Concentration distractible  Memory unable to assess  Insight, judgment, impulse control marginal at baseline      DIAGNOSIS:  Acute psychosis  Cognitive disorder          LABS: REVIEWED TODAY:  No results for input(s): WBC, HGB, PLT in the last 72 hours. Recent Labs     10/08/22  1748      K 3.8      CO2 24   BUN 14   CREATININE 0.7   GLUCOSE 103*     No results for input(s): BILITOT, ALKPHOS, AST, ALT in the last 72 hours.   Lab Results   Component Value Date/Time    LABAMPH NOT DETECTED 10/03/2022 12:22 PM    BARBSCNU NOT DETECTED 10/03/2022 12:22 PM    LABBENZ NOT DETECTED 10/03/2022 12:22 PM    LABMETH NOT DETECTED 10/03/2022 12:22 PM    OPIATESCREENURINE NOT DETECTED 10/03/2022 12:22 PM    PHENCYCLIDINESCREENURINE NOT DETECTED 10/03/2022 12:22 PM    ETOH <10 10/03/2022 12:22 PM     Lab Results   Component Value Date/Time    TSH 1.552 10/12/2010 01:20 PM     No results found for: LITHIUM  No results found for: VALPROATE, CBMZ  No results found for: LITHIUM, VALPROATE      Radiology CT HEAD WO CONTRAST    Result Date: 10/3/2022  EXAMINATION: CT OF THE HEAD WITHOUT CONTRAST  10/3/2022 3:34 pm TECHNIQUE: CT of the head was performed without the administration of intravenous contrast. Automated exposure control, iterative reconstruction, and/or weight based adjustment of the mA/kV was utilized to reduce the radiation dose to as low as reasonably achievable. COMPARISON: None. HISTORY: ORDERING SYSTEM PROVIDED HISTORY: altered mental status TECHNOLOGIST PROVIDED HISTORY: Reason for exam:->altered mental status Has a \"code stroke\" or \"stroke alert\" been called? ->No Decision Support Exception - unselect if not a suspected or confirmed emergency medical condition->Emergency Medical Condition (MA) What reading provider will be dictating this exam?->CRC FINDINGS: BRAIN/VENTRICLES: There is no acute intracranial hemorrhage, mass effect or midline shift. No abnormal extra-axial fluid collection. The gray-white differentiation is maintained without evidence of an acute infarct. There is no evidence of hydrocephalus. Changes of encephalomalacia right MCA and left PCA territories. Basilar tip aneurysm coil noted. ORBITS: The visualized portion of the orbits demonstrate no acute abnormality. SINUSES: The visualized paranasal sinuses and mastoid air cells demonstrate no acute abnormality. SOFT TISSUES/SKULL:  No acute abnormality of the visualized skull or soft tissues. Left posterior parietooccipital craniotomy. No acute intracranial abnormality. Changes of encephalomalacia right MCA and left PCA territories. TREATMENT PLAN:  The patient's diagnosis, treatment plan, medication management were formulated after patient was seen directly by the attending physician and myself and all relevant documentation was reviewed. Risk, benefit, side effects, possible outcomes of the medication and alternatives discussed with the patient and the patient demonstrated understanding. The patient was also educated that the outcome of treatment will depend on the medication compliance as directed by the prescribers along with regular follow-up, compliance with the labs and other work-up, as clinically indicated.     Risk Management: Based on the diagnosis and assessment biopsychosocial treatment model was presented to the patient and was given the opportunity to ask any question. The patient was agreeable to the plan and all the patient's questions were answered to the patient's satisfaction. I discussed with the patient the risk, benefit, alternative and common side effects for the proposed medication treatment. The patient is consenting to this treatment. The patients risk factors have been mitigated as they have been admitted to inpatient behavioral health in an emotionally supportive environment with q 15 minute safety checks. Okay to discontinue the 1:1. 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 Protective Factors: pt has access to basic needs, pt has safe and stable housing, pt has a physiatrist at , pt has good support from sister, brother and sister in law, pt has been compliant with medications, pt has no substance use, pt has no legal history. Collateral Information:  Will obtain collateral information from the family or friends. Will obtain medical records as appropriate from out patient providers  Will consult the hospitalist for a physical exam to rule out any co-morbid physical condition. Home medication Reconciled   Reviewed continued as clinically indicated    New Medications started during this admission :    Depakote 500 mg twice daily for mood stabilization and to reduce irritability agitation  Zyprexa  5 mg twice daily for augmentation of therapy  VPA level    Prn Haldol 5mg and Vistaril 50mg q6hr for extreme agitation. Trazodone as ordered for insomnia  Vistaril as ordered for anxiety      Psychotherapy:   Encourage participation in milieu and group therapy  Individual therapy as needed        NOTE: This report was transcribed using voice recognition software.  Every effort was made to ensure accuracy; however, inadvertent computerized transcription errors may be present. Behavioral Services  Medicare Certification Upon Admission    I certify that this patient's inpatient psychiatric hospital admission is medically necessary for:    [x] (1) Treatment which could reasonably be expected to improve this patient's condition,       [x] (2) Or for diagnostic study;     AND     [x](2) The inpatient psychiatric services are provided while the individual is under the care of a physician and are included in the individualized plan of care.     Estimated length of stay/service 5 - 7 days based on stability     Plan for post-hospital care follow with OP provider     Electronically signed by REAGAN Kirk CNP on 10/11/2022 at 8:06 AM

## 2022-10-11 NOTE — BH NOTE
Patient refuses vitals x 2 this morning. Will attempt again at a later time. Patient yells and screams at this nurse calling different peoples names and using nonsensical verb. Pt becomes agitated when approached for vitals or assessment questions. Will continue to monitor.

## 2022-10-12 PROCEDURE — 6370000000 HC RX 637 (ALT 250 FOR IP): Performed by: STUDENT IN AN ORGANIZED HEALTH CARE EDUCATION/TRAINING PROGRAM

## 2022-10-12 PROCEDURE — 6370000000 HC RX 637 (ALT 250 FOR IP): Performed by: PSYCHIATRY & NEUROLOGY

## 2022-10-12 PROCEDURE — 99232 SBSQ HOSP IP/OBS MODERATE 35: CPT | Performed by: NURSE PRACTITIONER

## 2022-10-12 PROCEDURE — 6370000000 HC RX 637 (ALT 250 FOR IP): Performed by: NURSE PRACTITIONER

## 2022-10-12 PROCEDURE — 1240000000 HC EMOTIONAL WELLNESS R&B

## 2022-10-12 RX ADMIN — OLANZAPINE 5 MG: 5 TABLET, ORALLY DISINTEGRATING ORAL at 10:54

## 2022-10-12 RX ADMIN — ANTI-FUNGAL POWDER MICONAZOLE NITRATE TALC FREE: 1.42 POWDER TOPICAL at 11:03

## 2022-10-12 RX ADMIN — Medication 5 MG: at 17:58

## 2022-10-12 RX ADMIN — DIVALPROEX SODIUM 500 MG: 500 TABLET, DELAYED RELEASE ORAL at 10:55

## 2022-10-12 RX ADMIN — OLANZAPINE 5 MG: 5 TABLET, ORALLY DISINTEGRATING ORAL at 21:48

## 2022-10-12 RX ADMIN — HYDROXYZINE PAMOATE 50 MG: 50 CAPSULE ORAL at 21:48

## 2022-10-12 RX ADMIN — ENALAPRIL MALEATE 10 MG: 10 TABLET ORAL at 10:47

## 2022-10-12 RX ADMIN — ANTI-FUNGAL POWDER MICONAZOLE NITRATE TALC FREE: 1.42 POWDER TOPICAL at 21:55

## 2022-10-12 RX ADMIN — ATORVASTATIN CALCIUM 80 MG: 40 TABLET, FILM COATED ORAL at 21:48

## 2022-10-12 RX ADMIN — METOPROLOL SUCCINATE 50 MG: 25 TABLET, EXTENDED RELEASE ORAL at 10:55

## 2022-10-12 RX ADMIN — POTASSIUM CHLORIDE 20 MEQ: 1500 TABLET, EXTENDED RELEASE ORAL at 17:58

## 2022-10-12 RX ADMIN — POTASSIUM CHLORIDE 20 MEQ: 1500 TABLET, EXTENDED RELEASE ORAL at 10:55

## 2022-10-12 RX ADMIN — DIVALPROEX SODIUM 500 MG: 500 TABLET, DELAYED RELEASE ORAL at 21:48

## 2022-10-12 ASSESSMENT — PAIN SCALES - GENERAL
PAINLEVEL_OUTOF10: 0

## 2022-10-12 NOTE — PROGRESS NOTES
Multiple attempts made to administer HS medications. Patient refused, stating \"You dont take night time meds at night. I need to do a deep clean. I heard the hour glass tipping over\". Patient made multiple delusional statements, including \"I'm the true [facility]. Nobody has the right to turn [place]" and \"I'm the Wizard of Oz and I dont want to become the green monster again\". Patient focused on someone named \"Phuong\" and that they are the only person that can help her.  Patients nurse made aware of refusal.

## 2022-10-12 NOTE — CARE COORDINATION
SW met with this pt for a daily check in. Pt observed sitting at the end of her bed. Pt observed talking nonsensically to herself. Pt appeared to be bizarre. Pt's eye-contact is poor. Pt is disorganized. ROBERT if pt is experiencing SI/ HI/ hallucinations/ delusions. Pt's discharge plan is to return to [address] [place] [address] for long-term care. Pt does not need a COVID test or pre-cert prior to discharge. Collateral gained from pt's sister, Mary Pierre. Pt will utilize ambulance transportation to return to the nursing home. SW will continue to monitor this pt's moods and behaviors.

## 2022-10-12 NOTE — PROGRESS NOTES
BEHAVIORAL HEALTH FOLLOW-UP NOTE     10/12/2022     Patient was seen and examined in person, Chart reviewed   Patient's case discussed with staff/team    Chief Complaint: resting in her bed remains nonsensical     Interim History:     Patient observed resting in her bed, she is not yelling or calling out, appears far more calm and relaxed. She remains nonsensical when attempting to interact. She refused her depakote this morning. She is quiet until there is noise or distraction then she becomes loud, yelling out nonsensical statements. She was heard yelling \"I have the money! \" While another peer was being loud     Appetite:   [x] Normal/Unchanged  [] Increased  [] Decreased      Sleep:       [x] Normal/Unchanged  [] Fair       [] Poor              Energy:    [x] Normal/Unchanged  [] Increased  [] Decreased        SI [] Present  [x] Absent    HI  []Present  [x] Absent     Aggression:  [] yes  [x] no    Patient is [x] able  [] unable to CONTRACT FOR SAFETY     PAST MEDICAL/PSYCHIATRIC HISTORY:   Past Medical History:   Diagnosis Date    Brain aneurysm         Hypertension     Lumbar herniated disc        FAMILY/SOCIAL HISTORY:  No family history on file.   Social History     Socioeconomic History    Marital status:      Spouse name: Not on file    Number of children: Not on file    Years of education: Not on file    Highest education level: Not on file   Occupational History    Not on file   Tobacco Use    Smoking status: Former     Types: Cigarettes     Quit date: 2009     Years since quittin.7    Smokeless tobacco: Not on file   Substance and Sexual Activity    Alcohol use: No    Drug use: No    Sexual activity: Yes   Other Topics Concern    Not on file   Social History Narrative    Not on file     Social Determinants of Health     Financial Resource Strain: Not on file   Food Insecurity: Not on file   Transportation Needs: Not on file   Physical Activity: Not on file   Stress: Not on file Social Connections: Not on file   Intimate Partner Violence: Not on file   Housing Stability: Not on file           ROS:  [x] All negative/unchanged except if checked.  Explain positive(checked items) below:  [] Constitutional  [] Eyes  [] Ear/Nose/Mouth/Throat  [] Respiratory  [] CV  [] GI  []   [] Musculoskeletal  [] Skin/Breast  [] Neurological  [] Endocrine  [] Heme/Lymph  [] Allergic/Immunologic    Explanation:     MEDICATIONS:    Current Facility-Administered Medications:     divalproex (DEPAKOTE) DR tablet 500 mg, 500 mg, Oral, 2 times per day, Zabrina Dennis APRN - CNP, 500 mg at 10/12/22 1055    OLANZapine zydis (ZYPREXA) disintegrating tablet 5 mg, 5 mg, Oral, BID, Zabrina Dennis, APRN - CNP, 5 mg at 10/12/22 1054    atorvastatin (LIPITOR) tablet 80 mg, 80 mg, Oral, Nightly, Lamont Rajan MD, 80 mg at 10/10/22 2108    enalapril (VASOTEC) tablet 10 mg, 10 mg, Oral, Daily, Lamont Rajan MD, 10 mg at 10/12/22 1047    melatonin disintegrating tablet 5 mg, 5 mg, Oral, Daily, Lamont Rajan MD, 5 mg at 10/11/22 1754    metoprolol succinate (TOPROL XL) extended release tablet 50 mg, 50 mg, Oral, Daily, Lamont Rajan MD, 50 mg at 10/12/22 1055    miconazole (MICOTIN) 2 % powder, , Topical, BID, Charlotte Ortiz MD, Given at 10/12/22 1103    polyethylene glycol (GLYCOLAX) packet 17 g, 17 g, Oral, Daily PRN, Marcos Rajan MD    potassium chloride (KLOR-CON M) extended release tablet 20 mEq, 20 mEq, Oral, BID WC, Lamont Rajan MD, 20 mEq at 10/12/22 1055    acetaminophen (TYLENOL) tablet 650 mg, 650 mg, Oral, Q4H PRN, Aggie Mcgowan MD    magnesium hydroxide (MILK OF MAGNESIA) 400 MG/5ML suspension 30 mL, 30 mL, Oral, Daily PRN, Aggie Mcgowan MD    nicotine (NICODERM CQ) 21 MG/24HR 1 patch, 1 patch, TransDERmal, Daily, Aggie Mcgowan MD    aluminum & magnesium hydroxide-simethicone (MAALOX) 200-200-20 MG/5ML suspension 30 mL, 30 mL, Oral, PRN, Aggie Mcgowan MD hydrOXYzine pamoate (VISTARIL) capsule 50 mg, 50 mg, Oral, TID PRN, Mariza Valencia MD, 50 mg at 10/11/22 0935    haloperidol (HALDOL) tablet 3 mg, 3 mg, Oral, Q6H PRN **OR** haloperidol lactate (HALDOL) injection 3 mg, 3 mg, IntraMUSCular, Q6H PRN, Mariza Valencia MD, 3 mg at 10/11/22 1050      Examination:  BP (!) 186/92   Pulse 88   Temp 97.8 °F (36.6 °C) (Temporal)   Resp 16   SpO2 96%   Gait - steady  Medication side effects(SE): none reported     Mental Status Examination:    Level of consciousness:  within normal limits   Appearance:  seated in bed  Behavior/Motor:  psychomotor agitation and agitated  Attitude toward examiner:  agitated   Speech:  rapid, loud, hyperverbal, and pressured   Mood: irritable and labile  Affect:  mood congruent  Thought processes:  incoherent   Thought content: Unable to assess  Cognition:  disoriented   Concentration distractible  Memory unable to assess  Insight, judgment, impulse control marginal at baseline       ASSESSMENT:   Patient symptoms are:  [] Well controlled  [] Improving  [] Worsening  [x] No change      Diagnosis:   Principal Problem:    [facility]  Resolved Problems:    * No resolved hospital problems. *      LABS:    No results for input(s): WBC, HGB, PLT in the last 72 hours. No results for input(s): NA, K, CL, CO2, BUN, CREATININE, GLUCOSE in the last 72 hours. No results for input(s): BILITOT, ALKPHOS, AST, ALT in the last 72 hours.   Lab Results   Component Value Date/Time    LABAMPH NOT DETECTED 10/03/2022 12:22 PM    BARBSCNU NOT DETECTED 10/03/2022 12:22 PM    LABBENZ NOT DETECTED 10/03/2022 12:22 PM    LABMETH NOT DETECTED 10/03/2022 12:22 PM    OPIATESCREENURINE NOT DETECTED 10/03/2022 12:22 PM    PHENCYCLIDINESCREENURINE NOT DETECTED 10/03/2022 12:22 PM    ETOH <10 10/03/2022 12:22 PM     Lab Results   Component Value Date/Time    TSH 1.552 10/12/2010 01:20 PM     No results found for: LITHIUM  No results found for: VALPROATE, CBMZ        Treatment Plan:  The patient's diagnosis, treatment plan, medication management were formulated after patient was seen directly by the attending physician and myself and all relevant documentation was reviewed. Risk, benefit, side effects, possible outcomes of the medication and alternatives discussed with the patient and the patient demonstrated understanding. The patient was also educated that the outcome of treatment will depend on the medication compliance as directed by the prescribers along with regular follow-up, compliance with the labs and other work-up, as clinically indicated. New Medications started during this admission :    Depakote 500 mg twice daily for mood stabilization and to reduce irritability agitation  Zyprexa  5 mg twice daily for augmentation of therapy  VPA level    Collateral information: Followed by social work   CD evaluation  Encourage patient to attend group and other milieu activities. Discharge planning discussed with the patient and treatment team.    PSYCHOTHERAPY/COUNSELING:  [x] Therapeutic interview  [x] Supportive  [] CBT  [] Ongoing  [] Other    [x] Patient continues to need, on a daily basis, active treatment furnished directly by or requiring the supervision of inpatient psychiatric personnel      Anticipated Length of stay: 3 to 5 days based on stability       NOTE: This report was transcribed using voice recognition software. Every effort was made to ensure accuracy; however, inadvertent computerized transcription errors may be present.     Electronically signed by REAGAN Medley CNP on 10/12/2022 at 1:45 PM

## 2022-10-12 NOTE — CARE COORDINATION
SW received a voicemail from pt's sister-in-law Ca Berman [phone]. Yee Bell able to provide this pt's pin number. AIMEE called Yee Bell back and M.

## 2022-10-12 NOTE — PLAN OF CARE
Problem: Behavior  Goal: Pt/Family maintain appropriate behavior and adhere to behavioral management agreement, if implemented  Description: INTERVENTIONS:  1. Assess patient/family's coping skills and  non-compliant behavior (including use of illegal substances)  2. Notify security of behavior or suspected illegal substances which indicate the need for search of the family and/or belongings  3. Encourage verbalization of thoughts and concerns in a socially appropriate manner  4. Utilize positive, consistent limit setting strategies supporting safety of patient, staff and others  5. Encourage participation in the decision making process about the behavioral management agreement  6. If a visitor's behavior poses a threat to safety call refer to organization policy. 7. Initiate consult with , Psychosocial CNS, Spiritual Care as appropriate  Outcome: Progressing     Problem: Self Harm/Suicidality  Goal: Will have no self-injury during hospital stay  Description: INTERVENTIONS:  1. Q 30 MINUTES: Routine safety checks  2. Q SHIFT & PRN: Assess risk to determine if routine checks are adequate to maintain patient safety  Outcome: Progressing     Problem: Xiomara  Goal: Will exhibit normal sleep and speech and no impulsivity  Description: INTERVENTIONS:  1. Administer medication as ordered  2. Set limits on impulsive behavior  3. Make attempts to decrease external stimuli as possible  10/11/2022 1528 by Robles Quigley RN  Outcome: Not Progressing       Patient denies suicidal/homicidal ideations. Reports hearing voices \" Deep cleaning voices\" . Patient awake sitting on edge of bed with eyes closed. Patient had tangential speech, nonsensical and flight of ideas. No sxs of distress noted. Purposeful rounds continued Q 15 minutes.

## 2022-10-12 NOTE — PROGRESS NOTES
Patient resting quiet to self at this time, respirations are even and unlabored, no signs or symptoms of distress or discomfort. Refused routine HS meds this shift: Lipitor 80 mg and Depakote 500 mg. Staff will continue to conduct environmental rounds to ensure the safety of everyone on the unit. Staff will provide support and interventions as requested or required.

## 2022-10-12 NOTE — PLAN OF CARE
Problem: Chronic Conditions and Co-morbidities  Goal: Patient's chronic conditions and co-morbidity symptoms are monitored and maintained or improved  10/12/2022 1149 by Danny Wright RN  Outcome: Progressing     Problem: Self Harm/Suicidality  Goal: Will have no self-injury during hospital stay  Description: INTERVENTIONS:  1. Q 30 MINUTES: Routine safety checks  2. Q SHIFT & PRN: Assess risk to determine if routine checks are adequate to maintain patient safety  10/12/2022 1149 by Danny Wright RN  Outcome: Progressing     Problem: Depression  Goal: Will be euthymic at discharge  Description: INTERVENTIONS:  1. Administer medication as ordered  2. Provide emotional support via 1:1 interaction with staff  3. Encourage involvement in milieu/groups/activities  4. Monitor for social isolation  Outcome: Progressing     Problem: Xiomara  Goal: Will exhibit normal sleep and speech and no impulsivity  Description: INTERVENTIONS:  1. Administer medication as ordered  2. Set limits on impulsive behavior  3. Make attempts to decrease external stimuli as possible  Outcome: Progressing     Problem: Psychosis  Goal: Will report no hallucinations or delusions  Description: INTERVENTIONS:  1. Administer medication as  ordered  2. Assist with reality testing to support increasing orientation  3. Assess if patient's hallucinations or delusions are encouraging self harm or harm to others and intervene as appropriate  Outcome: Progressing     Problem: Behavior  Goal: Pt/Family maintain appropriate behavior and adhere to behavioral management agreement, if implemented  Description: INTERVENTIONS:  1. Assess patient/family's coping skills and  non-compliant behavior (including use of illegal substances)  2. Notify security of behavior or suspected illegal substances which indicate the need for search of the family and/or belongings  3. Encourage verbalization of thoughts and concerns in a socially appropriate manner  4.  Utilize positive, consistent limit setting strategies supporting safety of patient, staff and others  5. Encourage participation in the decision making process about the behavioral management agreement  6. If a visitor's behavior poses a threat to safety call refer to organization policy. 7. Initiate consult with , Psychosocial CNS, Spiritual Care as appropriate  10/12/2022 1149 by Joan Forte RN  Outcome: Progressing     Problem: Anxiety  Goal: Will report anxiety at manageable levels  Description: INTERVENTIONS:  1. Administer medication as ordered  2. Teach and rehearse alternative coping skills  3. Provide emotional support with 1:1 interaction with staff  Outcome: Progressing     Problem: Sleep Disturbance  Goal: Will exhibit normal sleeping pattern  Description: INTERVENTIONS:  1. Administer medication as ordered  2. Decrease environmental stimuli, including noise, as appropriate  3. Discourage social isolation and naps during the day  Outcome: Progressing     Problem: Involuntary Admit  Goal: Will cooperate with staff recommendations and doctor's orders and will demonstrate appropriate behavior  Description: INTERVENTIONS:  1. Treat underlying conditions and offer medication as ordered  2. Educate regarding involuntary admission procedures and rules  3. Contain excessive/inappropriate behavior per unit and hospital policies  Outcome: Progressing     Problem: Self Care Deficit  Goal: Return ADL status to a safe level of function  Description: INTERVENTIONS:  1. Administer medication as ordered  2. Assess ADL deficits and provide assistive devices as needed  3. Obtain PT/OT consults as needed  4. Assist and instruct patient to increase activity and self care as tolerated  Outcome: Progressing    Patient is alert and oriented to self only. Patient denies suicidal ideations, homicidal ideations, and hallucinations.  However, patient appears internally stimulated and is observed speaking to unseen others while in her room. She avoids eye contact. Patient's thoughts are disorganized, she is nonsensical, and has loose associations. She is tangential. Suspicious of others. She is medication compliant, with encouragement, and is in control of her behavior.

## 2022-10-12 NOTE — CARE COORDINATION
AIMEE spoke with pt's sister-in-law, Conrado Flynn ([phone]). [facility] was able to provide this pt's pin number. Minimal information shared with [facility]. [facility] states that this pt has never been psychotic before, and the pt's current is concerning for family. [facility] concerned about this pt's ability to make decisions, and feels this pt may need an SEE filled out if doctor is agreeable. [facility] requesting a neuro psych consult to be done.  AIMEE will notify treatment team.

## 2022-10-13 PROCEDURE — 6370000000 HC RX 637 (ALT 250 FOR IP): Performed by: NURSE PRACTITIONER

## 2022-10-13 PROCEDURE — 1240000000 HC EMOTIONAL WELLNESS R&B

## 2022-10-13 PROCEDURE — 99232 SBSQ HOSP IP/OBS MODERATE 35: CPT | Performed by: NURSE PRACTITIONER

## 2022-10-13 PROCEDURE — 6370000000 HC RX 637 (ALT 250 FOR IP): Performed by: STUDENT IN AN ORGANIZED HEALTH CARE EDUCATION/TRAINING PROGRAM

## 2022-10-13 RX ORDER — DIPHENHYDRAMINE HCL 25 MG
50 TABLET ORAL ONCE
Status: COMPLETED | OUTPATIENT
Start: 2022-10-13 | End: 2022-10-13

## 2022-10-13 RX ADMIN — METOPROLOL SUCCINATE 50 MG: 25 TABLET, EXTENDED RELEASE ORAL at 10:00

## 2022-10-13 RX ADMIN — POTASSIUM CHLORIDE 20 MEQ: 1500 TABLET, EXTENDED RELEASE ORAL at 10:01

## 2022-10-13 RX ADMIN — ANTI-FUNGAL POWDER MICONAZOLE NITRATE TALC FREE: 1.42 POWDER TOPICAL at 21:19

## 2022-10-13 RX ADMIN — DIVALPROEX SODIUM 500 MG: 500 TABLET, DELAYED RELEASE ORAL at 21:14

## 2022-10-13 RX ADMIN — OLANZAPINE 5 MG: 5 TABLET, ORALLY DISINTEGRATING ORAL at 10:01

## 2022-10-13 RX ADMIN — ANTI-FUNGAL POWDER MICONAZOLE NITRATE TALC FREE: 1.42 POWDER TOPICAL at 10:08

## 2022-10-13 RX ADMIN — POTASSIUM CHLORIDE 20 MEQ: 1500 TABLET, EXTENDED RELEASE ORAL at 17:44

## 2022-10-13 RX ADMIN — ENALAPRIL MALEATE 10 MG: 10 TABLET ORAL at 10:19

## 2022-10-13 RX ADMIN — ATORVASTATIN CALCIUM 80 MG: 40 TABLET, FILM COATED ORAL at 21:14

## 2022-10-13 RX ADMIN — Medication 5 MG: at 17:44

## 2022-10-13 RX ADMIN — OLANZAPINE 5 MG: 5 TABLET, ORALLY DISINTEGRATING ORAL at 21:14

## 2022-10-13 RX ADMIN — DIPHENHYDRAMINE HYDROCHLORIDE 50 MG: 25 TABLET ORAL at 13:25

## 2022-10-13 RX ADMIN — DIVALPROEX SODIUM 500 MG: 500 TABLET, DELAYED RELEASE ORAL at 10:00

## 2022-10-13 ASSESSMENT — PAIN SCALES - GENERAL
PAINLEVEL_OUTOF10: 0
PAINLEVEL_OUTOF10: 0

## 2022-10-13 NOTE — PLAN OF CARE
Problem: Self Harm/Suicidality  Goal: Will have no self-injury during hospital stay  Description: INTERVENTIONS:  1. Q 30 MINUTES: Routine safety checks  2. Q SHIFT & PRN: Assess risk to determine if routine checks are adequate to maintain patient safety  10/12/2022 2135 by Daniela Gallardo RN  Outcome: Progressing     Problem: Psychosis  Goal: Will report no hallucinations or delusions  Description: INTERVENTIONS:  1. Administer medication as  ordered  2. Assist with reality testing to support increasing orientation  3. Assess if patient's hallucinations or delusions are encouraging self harm or harm to others and intervene as appropriate  10/12/2022 2135 by Daniela Gallardo RN  Outcome: Progressing       Patient denies SI/HI/AVH. Denies depression and anxiety. Alert to self and year, denies pain. Patient observed lying in bed. Cooperative with VS and assessment, poor eye contact, flight of ideas, tangential, and nonsensical. Offered emotional support and presence. No sxs of distress noted. No behaviors noted. Purposeful rounds continued Q 15 minutes.

## 2022-10-13 NOTE — PROGRESS NOTES
Patient resting quiet to self at this time, respirations are even and unlabored, no signs or symptoms of distress or discomfort. PRN  Vistaril administered this shift. Staff will continue to conduct environmental rounds to ensure the safety of everyone on the unit. Staff will provide support and interventions as requested or required.

## 2022-10-13 NOTE — PROGRESS NOTES
[facility]  Day 3 Interdisciplinary Treatment Plan NOTE    Review Date & Time: 10/13/2022 0900    Patient was in treatment team    Estimated Length of Stay Update:  3-5  Estimated Discharge Date Update: 10/14/2022    EDUCATION:   Learner Progress Toward Treatment Goals: Reviewed results and recommendations of this team    Method: Small group    Outcome: Verbalized understanding    PATIENT GOALS: None at this time. PLAN/TREATMENT RECOMMENDATIONS UPDATE: Encourage patient to attend and participate in groups. GOALS UPDATE:   Time frame for Short-Term Goals: Prior to discharge.       Imer Mendoza RN

## 2022-10-13 NOTE — PROGRESS NOTES
BEHAVIORAL HEALTH FOLLOW-UP NOTE     10/13/2022     Patient was seen and examined in person, Chart reviewed   Patient's case discussed with staff/team    Chief Complaint: \"My skin is itching\"     Interim History:     Patient observed resting in her bed, she is not yelling or calling out, appears far more calm and relaxed. She remains nonsensical when attempting to interact. She is able to tell me the year, she can't tell me the month, she is rambling. Her actions and behaviors are more calm. She has less psychomotor agitation. She covers her face with her hospital gown and tells me that she is over medicated on tylenol. Appetite:   [x] Normal/Unchanged  [] Increased  [] Decreased      Sleep:       [x] Normal/Unchanged  [] Fair       [] Poor              Energy:    [x] Normal/Unchanged  [] Increased  [] Decreased        SI [] Present  [x] Absent    HI  []Present  [x] Absent     Aggression:  [] yes  [x] no    Patient is [x] able  [] unable to CONTRACT FOR SAFETY     PAST MEDICAL/PSYCHIATRIC HISTORY:   Past Medical History:   Diagnosis Date    Brain aneurysm         Hypertension     Lumbar herniated disc        FAMILY/SOCIAL HISTORY:  No family history on file.   Social History     Socioeconomic History    Marital status:      Spouse name: Not on file    Number of children: Not on file    Years of education: Not on file    Highest education level: Not on file   Occupational History    Not on file   Tobacco Use    Smoking status: Former     Types: Cigarettes     Quit date: 2009     Years since quittin.7    Smokeless tobacco: Not on file   Substance and Sexual Activity    Alcohol use: No    Drug use: No    Sexual activity: Yes   Other Topics Concern    Not on file   Social History Narrative    Not on file     Social Determinants of Health     Financial Resource Strain: Not on file   Food Insecurity: Not on file   Transportation Needs: Not on file   Physical Activity: Not on file   Stress: Not on file   Social Connections: Not on file   Intimate Partner Violence: Not on file   Housing Stability: Not on file           ROS:  [x] All negative/unchanged except if checked.  Explain positive(checked items) below:  [] Constitutional  [] Eyes  [] Ear/Nose/Mouth/Throat  [] Respiratory  [] CV  [] GI  []   [] Musculoskeletal  [] Skin/Breast  [] Neurological  [] Endocrine  [] Heme/Lymph  [] Allergic/Immunologic    Explanation:     MEDICATIONS:    Current Facility-Administered Medications:     divalproex (DEPAKOTE) DR tablet 500 mg, 500 mg, Oral, 2 times per day, REAGAN Conn CNP, 500 mg at 10/12/22 2148    OLANZapine zydis (ZYPREXA) disintegrating tablet 5 mg, 5 mg, Oral, BID, REAGAN Conn CNP, 5 mg at 10/12/22 2148    atorvastatin (LIPITOR) tablet 80 mg, 80 mg, Oral, Nightly, Lamont Rajan MD, 80 mg at 10/12/22 2148    enalapril (VASOTEC) tablet 10 mg, 10 mg, Oral, Daily, Lamont Rajan MD, 10 mg at 10/12/22 1047    melatonin disintegrating tablet 5 mg, 5 mg, Oral, Daily, Lamont Rajan MD, 5 mg at 10/12/22 1758    metoprolol succinate (TOPROL XL) extended release tablet 50 mg, 50 mg, Oral, Daily, Lamont Rajan MD, 50 mg at 10/12/22 1055    miconazole (MICOTIN) 2 % powder, , Topical, BID, Jolene Damon MD, Given at 10/12/22 2155    polyethylene glycol (GLYCOLAX) packet 17 g, 17 g, Oral, Daily PRN, Natasha Rajan MD    potassium chloride (KLOR-CON M) extended release tablet 20 mEq, 20 mEq, Oral, BID WC, Lamont Rajan MD, 20 mEq at 10/12/22 1758    acetaminophen (TYLENOL) tablet 650 mg, 650 mg, Oral, Q4H PRN, Jared Belle MD    magnesium hydroxide (MILK OF MAGNESIA) 400 MG/5ML suspension 30 mL, 30 mL, Oral, Daily PRN, Jared Belle MD    nicotine (NICODERM CQ) 21 MG/24HR 1 patch, 1 patch, TransDERmal, Daily, Jared Belle MD    aluminum & magnesium hydroxide-simethicone (MAALOX) 200-200-20 MG/5ML suspension 30 mL, 30 mL, Oral, PRN, Jared Belle MD hydrOXYzine pamoate (VISTARIL) capsule 50 mg, 50 mg, Oral, TID PRN, Aiden Gomez MD, 50 mg at 10/12/22 2148    haloperidol (HALDOL) tablet 3 mg, 3 mg, Oral, Q6H PRN **OR** haloperidol lactate (HALDOL) injection 3 mg, 3 mg, IntraMUSCular, Q6H PRN, Aiden Gomez MD, 3 mg at 10/11/22 1050      Examination:  BP (!) 154/85   Pulse 61   Temp 97.4 °F (36.3 °C) (Temporal)   Resp 18   SpO2 97%   Gait - steady  Medication side effects(SE): none reported     Mental Status Examination:    Level of consciousness:  within normal limits   Appearance:  seated in bed  Behavior/Motor:  psychomotor agitation and agitated  Attitude toward examiner:  agitated   Speech:  rapid, loud, hyperverbal, and pressured   Mood: irritable and labile  Affect:  mood congruent  Thought processes:  incoherent   Thought content: Unable to assess  Cognition:  disoriented   Concentration distractible  Memory unable to assess  Insight, judgment, impulse control marginal at baseline       ASSESSMENT:   Patient symptoms are:  [] Well controlled  [] Improving  [] Worsening  [x] No change      Diagnosis:   Principal Problem:    [facility] ([facility]  Resolved Problems:    * No resolved hospital problems. *      LABS:    No results for input(s): WBC, HGB, PLT in the last 72 hours. No results for input(s): NA, K, CL, CO2, BUN, CREATININE, GLUCOSE in the last 72 hours. No results for input(s): BILITOT, ALKPHOS, AST, ALT in the last 72 hours.   Lab Results   Component Value Date/Time    LABAMPH NOT DETECTED 10/03/2022 12:22 PM    BARBSCNU NOT DETECTED 10/03/2022 12:22 PM    LABBENZ NOT DETECTED 10/03/2022 12:22 PM    LABMETH NOT DETECTED 10/03/2022 12:22 PM    OPIATESCREENURINE NOT DETECTED 10/03/2022 12:22 PM    PHENCYCLIDINESCREENURINE NOT DETECTED 10/03/2022 12:22 PM    ETOH <10 10/03/2022 12:22 PM     Lab Results   Component Value Date/Time    TSH 1.552 10/12/2010 01:20 PM     No results found for: LITHIUM  No results found for: VALPROATE, CBMZ        Treatment Plan:  The patient's diagnosis, treatment plan, medication management were formulated after patient was seen directly by the attending physician and myself and all relevant documentation was reviewed. Risk, benefit, side effects, possible outcomes of the medication and alternatives discussed with the patient and the patient demonstrated understanding. The patient was also educated that the outcome of treatment will depend on the medication compliance as directed by the prescribers along with regular follow-up, compliance with the labs and other work-up, as clinically indicated. New Medications started during this admission :    Depakote 500 mg twice daily for mood stabilization and to reduce irritability agitation  Zyprexa  5 mg twice daily for augmentation of therapy  VPA level    Collateral information: Followed by social work   CD evaluation  Encourage patient to attend group and other milieu activities. Discharge planning discussed with the patient and treatment team.    PSYCHOTHERAPY/COUNSELING:  [x] Therapeutic interview  [x] Supportive  [] CBT  [] Ongoing  [] Other    [x] Patient continues to need, on a daily basis, active treatment furnished directly by or requiring the supervision of inpatient psychiatric personnel      Anticipated Length of stay: 3 to 5 days based on stability       NOTE: This report was transcribed using voice recognition software. Every effort was made to ensure accuracy; however, inadvertent computerized transcription errors may be present.     Electronically signed by REAGAN Gilliland CNP on 10/13/2022 at 8:17 AM

## 2022-10-13 NOTE — CARE COORDINATION
SW attempted to meet with this pt for a daily check in. Pt observed sleeping soundly in her room. SW will try again at a later time.

## 2022-10-14 PROCEDURE — 1240000000 HC EMOTIONAL WELLNESS R&B

## 2022-10-14 PROCEDURE — 6370000000 HC RX 637 (ALT 250 FOR IP): Performed by: PSYCHIATRY & NEUROLOGY

## 2022-10-14 PROCEDURE — 6370000000 HC RX 637 (ALT 250 FOR IP): Performed by: STUDENT IN AN ORGANIZED HEALTH CARE EDUCATION/TRAINING PROGRAM

## 2022-10-14 PROCEDURE — 99232 SBSQ HOSP IP/OBS MODERATE 35: CPT | Performed by: NURSE PRACTITIONER

## 2022-10-14 PROCEDURE — 6370000000 HC RX 637 (ALT 250 FOR IP): Performed by: NURSE PRACTITIONER

## 2022-10-14 RX ADMIN — Medication 5 MG: at 17:09

## 2022-10-14 RX ADMIN — OLANZAPINE 5 MG: 5 TABLET, ORALLY DISINTEGRATING ORAL at 21:05

## 2022-10-14 RX ADMIN — ATORVASTATIN CALCIUM 80 MG: 40 TABLET, FILM COATED ORAL at 21:05

## 2022-10-14 RX ADMIN — POTASSIUM CHLORIDE 20 MEQ: 1500 TABLET, EXTENDED RELEASE ORAL at 17:09

## 2022-10-14 RX ADMIN — OLANZAPINE 5 MG: 5 TABLET, ORALLY DISINTEGRATING ORAL at 09:46

## 2022-10-14 RX ADMIN — DIVALPROEX SODIUM 500 MG: 500 TABLET, DELAYED RELEASE ORAL at 09:46

## 2022-10-14 RX ADMIN — HYDROXYZINE PAMOATE 50 MG: 50 CAPSULE ORAL at 21:05

## 2022-10-14 RX ADMIN — METOPROLOL SUCCINATE 50 MG: 25 TABLET, EXTENDED RELEASE ORAL at 09:46

## 2022-10-14 RX ADMIN — POTASSIUM CHLORIDE 20 MEQ: 1500 TABLET, EXTENDED RELEASE ORAL at 09:46

## 2022-10-14 RX ADMIN — ENALAPRIL MALEATE 10 MG: 10 TABLET ORAL at 09:46

## 2022-10-14 RX ADMIN — DIVALPROEX SODIUM 500 MG: 500 TABLET, DELAYED RELEASE ORAL at 21:05

## 2022-10-14 ASSESSMENT — PAIN SCALES - GENERAL
PAINLEVEL_OUTOF10: 0
PAINLEVEL_OUTOF10: 0

## 2022-10-14 NOTE — PLAN OF CARE
Isolative to room   paranoid and suspicious   labile  tearful at times     states she  Mr Shine Wade with estelle    cooperative with meds  did not attend group   will continue to monitor

## 2022-10-14 NOTE — CARE COORDINATION
SW received a voicemail from pt's sister in law Eron [place] [phone] to know more information about pt's discharge planning.  Pt does not have an MANAN signed for this individual.

## 2022-10-14 NOTE — PROGRESS NOTES
BEHAVIORAL HEALTH FOLLOW-UP NOTE     10/14/2022     Patient was seen and examined in person, Chart reviewed   Patient's case discussed with staff/team    Chief Complaint: \"I am on the floor! \"      Interim History:     Patient observed in her room this morning, she tells me that she is on the floor, but she is not. She is less agitated, more calm organized, yet disconnected. Denies any SI/HI intent or plan. Continues to ramble. Becomes louder if there is noise on the unit, or when attempted to speak with the room mate. She answers some questions appropriately, others she is nonsensical. Behaviors more controlled       Appetite:   [x] Normal/Unchanged  [] Increased  [] Decreased      Sleep:       [x] Normal/Unchanged  [] Fair       [] Poor              Energy:    [x] Normal/Unchanged  [] Increased  [] Decreased        SI [] Present  [x] Absent    HI  []Present  [x] Absent     Aggression:  [] yes  [x] no    Patient is [x] able  [] unable to CONTRACT FOR SAFETY     PAST MEDICAL/PSYCHIATRIC HISTORY:   Past Medical History:   Diagnosis Date    Brain aneurysm         Hypertension     Lumbar herniated disc        FAMILY/SOCIAL HISTORY:  No family history on file.   Social History     Socioeconomic History    Marital status:      Spouse name: Not on file    Number of children: Not on file    Years of education: Not on file    Highest education level: Not on file   Occupational History    Not on file   Tobacco Use    Smoking status: Former     Types: Cigarettes     Quit date: 2009     Years since quittin.7    Smokeless tobacco: Not on file   Substance and Sexual Activity    Alcohol use: No    Drug use: No    Sexual activity: Yes   Other Topics Concern    Not on file   Social History Narrative    Not on file     Social Determinants of Health     Financial Resource Strain: Not on file   Food Insecurity: Not on file   Transportation Needs: Not on file   Physical Activity: Not on file   Stress: Not on file Social Connections: Not on file   Intimate Partner Violence: Not on file   Housing Stability: Not on file           ROS:  [x] All negative/unchanged except if checked.  Explain positive(checked items) below:  [] Constitutional  [] Eyes  [] Ear/Nose/Mouth/Throat  [] Respiratory  [] CV  [] GI  []   [] Musculoskeletal  [] Skin/Breast  [] Neurological  [] Endocrine  [] Heme/Lymph  [] Allergic/Immunologic    Explanation:     MEDICATIONS:    Current Facility-Administered Medications:     divalproex (DEPAKOTE) DR tablet 500 mg, 500 mg, Oral, 2 times per day, REAGAN Lopez CNP, 500 mg at 10/14/22 0946    OLANZapine zydis (ZYPREXA) disintegrating tablet 5 mg, 5 mg, Oral, BID, REAGAN Lopez CNP, 5 mg at 10/14/22 0946    atorvastatin (LIPITOR) tablet 80 mg, 80 mg, Oral, Nightly, Lamont Rajan MD, 80 mg at 10/13/22 2114    enalapril (VASOTEC) tablet 10 mg, 10 mg, Oral, Daily, Lamont Rajan MD, 10 mg at 10/14/22 0946    melatonin disintegrating tablet 5 mg, 5 mg, Oral, Daily, Lamont Rajan MD, 5 mg at 10/13/22 1744    metoprolol succinate (TOPROL XL) extended release tablet 50 mg, 50 mg, Oral, Daily, Lamont Rajan MD, 50 mg at 10/14/22 0946    miconazole (MICOTIN) 2 % powder, , Topical, BID, Katheryn Espino MD, Given at 10/13/22 2119    polyethylene glycol (GLYCOLAX) packet 17 g, 17 g, Oral, Daily PRN, Gemma Bidding MD Mohini    potassium chloride (KLOR-CON M) extended release tablet 20 mEq, 20 mEq, Oral, BID WC, Lamont Rajan MD, 20 mEq at 10/14/22 0946    acetaminophen (TYLENOL) tablet 650 mg, 650 mg, Oral, Q4H PRN, Beverly Kuhn MD    magnesium hydroxide (MILK OF MAGNESIA) 400 MG/5ML suspension 30 mL, 30 mL, Oral, Daily PRN, Beverly Kuhn MD    nicotine (NICODERM CQ) 21 MG/24HR 1 patch, 1 patch, TransDERmal, Daily, Beverly Kuhn MD    aluminum & magnesium hydroxide-simethicone (MAALOX) 200-200-20 MG/5ML suspension 30 mL, 30 mL, Oral, PRN, Beverly Kuhn MD hydrOXYzine pamoate (VISTARIL) capsule 50 mg, 50 mg, Oral, TID PRN, Aguilar Escobar MD, 50 mg at 10/12/22 1478    haloperidol (HALDOL) tablet 3 mg, 3 mg, Oral, Q6H PRN **OR** haloperidol lactate (HALDOL) injection 3 mg, 3 mg, IntraMUSCular, Q6H PRN, Aguilar Escobar MD, 3 mg at 10/11/22 1050      Examination:  /74   Pulse 60   Temp (!) 96.1 °F (35.6 °C) (Temporal)   Resp 18   SpO2 96%   Gait - steady  Medication side effects(SE): none reported     Mental Status Examination:    Level of consciousness:  within normal limits   Appearance:  seated in bed  Behavior/Motor:  psychomotor agitation and agitated  Attitude toward examiner:  agitated   Speech:  rapid, loud, hyperverbal, and pressured   Mood: irritable and labile  Affect:  mood congruent  Thought processes:  incoherent   Thought content: Unable to assess  Cognition:  disoriented   Concentration distractible  Memory unable to assess  Insight, judgment, impulse control marginal at baseline       ASSESSMENT:   Patient symptoms are:  [] Well controlled  [] Improving  [] Worsening  [x] No change      Diagnosis:   Principal Problem:    [facility]  Resolved Problems:    * No resolved hospital problems. *      LABS:    No results for input(s): WBC, HGB, PLT in the last 72 hours. No results for input(s): NA, K, CL, CO2, BUN, CREATININE, GLUCOSE in the last 72 hours. No results for input(s): BILITOT, ALKPHOS, AST, ALT in the last 72 hours.   Lab Results   Component Value Date/Time    LABAMPH NOT DETECTED 10/03/2022 12:22 PM    BARBSCNU NOT DETECTED 10/03/2022 12:22 PM    LABBENZ NOT DETECTED 10/03/2022 12:22 PM    LABMETH NOT DETECTED 10/03/2022 12:22 PM    OPIATESCREENURINE NOT DETECTED 10/03/2022 12:22 PM    PHENCYCLIDINESCREENURINE NOT DETECTED 10/03/2022 12:22 PM    ETOH <10 10/03/2022 12:22 PM     Lab Results   Component Value Date/Time    TSH 1.552 10/12/2010 01:20 PM     No results found for: LITHIUM  No results found for: VALPROATE, CBMZ        Treatment Plan:  The patient's diagnosis, treatment plan, medication management were formulated after patient was seen directly by the attending physician and myself and all relevant documentation was reviewed. Risk, benefit, side effects, possible outcomes of the medication and alternatives discussed with the patient and the patient demonstrated understanding. The patient was also educated that the outcome of treatment will depend on the medication compliance as directed by the prescribers along with regular follow-up, compliance with the labs and other work-up, as clinically indicated. New Medications started during this admission :    Depakote 500 mg twice daily for mood stabilization and to reduce irritability agitation  Zyprexa  5 mg twice daily for augmentation of therapy  VPA level    Collateral information: Followed by social work   CD evaluation  Encourage patient to attend group and other milieu activities. Discharge planning discussed with the patient and treatment team.    PSYCHOTHERAPY/COUNSELING:  [x] Therapeutic interview  [x] Supportive  [] CBT  [] Ongoing  [] Other    [x] Patient continues to need, on a daily basis, active treatment furnished directly by or requiring the supervision of inpatient psychiatric personnel      Anticipated Length of stay: 3 to 5 days based on stability       NOTE: This report was transcribed using voice recognition software. Every effort was made to ensure accuracy; however, inadvertent computerized transcription errors may be present.     Electronically signed by REAGAN Albert CNP on 10/14/2022 at 9:52 AM

## 2022-10-14 NOTE — PLAN OF CARE
Problem: Self Harm/Suicidality  Goal: Will have no self-injury during hospital stay  Description: INTERVENTIONS:  1. Q 30 MINUTES: Routine safety checks  2. Q SHIFT & PRN: Assess risk to determine if routine checks are adequate to maintain patient safety  10/14/2022 0007 by Aidee Mosley RN  Outcome: Progressing       Patient denies SI/HI/AVH. Alert is to self and place. Appears internally stimulated and tangential, emotional support and presence offered. Observed in bed awake, cooperative with care and medication compliant. No sxs of distress noted. No behaviors noted. Purposeful rounds continued Q 15 minutes.

## 2022-10-14 NOTE — PROGRESS NOTES
Patient resting quiet to self at this time, respirations are even and unlabored, no signs or symptoms of distress or discomfort. No PRN medications administered this shift. Staff will continue to conduct environmental rounds to ensure the safety of everyone on the unit. Staff will provide support and interventions as requested or required.

## 2022-10-14 NOTE — CARE COORDINATION
Encouraged pt to attend morning community meeting.    she declined but stated her goal is to eat food

## 2022-10-15 PROCEDURE — 1240000000 HC EMOTIONAL WELLNESS R&B

## 2022-10-15 PROCEDURE — 99232 SBSQ HOSP IP/OBS MODERATE 35: CPT | Performed by: NURSE PRACTITIONER

## 2022-10-15 PROCEDURE — 6370000000 HC RX 637 (ALT 250 FOR IP): Performed by: NURSE PRACTITIONER

## 2022-10-15 PROCEDURE — 6370000000 HC RX 637 (ALT 250 FOR IP): Performed by: STUDENT IN AN ORGANIZED HEALTH CARE EDUCATION/TRAINING PROGRAM

## 2022-10-15 RX ADMIN — OLANZAPINE 5 MG: 5 TABLET, ORALLY DISINTEGRATING ORAL at 09:13

## 2022-10-15 RX ADMIN — DIVALPROEX SODIUM 500 MG: 500 TABLET, DELAYED RELEASE ORAL at 09:13

## 2022-10-15 RX ADMIN — POTASSIUM CHLORIDE 20 MEQ: 1500 TABLET, EXTENDED RELEASE ORAL at 17:08

## 2022-10-15 RX ADMIN — POTASSIUM CHLORIDE 20 MEQ: 1500 TABLET, EXTENDED RELEASE ORAL at 09:13

## 2022-10-15 RX ADMIN — METOPROLOL SUCCINATE 50 MG: 25 TABLET, EXTENDED RELEASE ORAL at 09:13

## 2022-10-15 RX ADMIN — ENALAPRIL MALEATE 10 MG: 10 TABLET ORAL at 09:13

## 2022-10-15 ASSESSMENT — PAIN SCALES - GENERAL: PAINLEVEL_OUTOF10: 0

## 2022-10-15 NOTE — PLAN OF CARE
Mood is labile     irritable in the a.m.          paranoid and suspicious   delusional statements   rambling speech    isolative to room   ambulates with assist of walker    cooperative with meds  will continue to monitor

## 2022-10-15 NOTE — PROGRESS NOTES
BEHAVIORAL HEALTH FOLLOW-UP NOTE     10/15/2022     Patient was seen and examined in person, Chart reviewed   Patient's case discussed with staff/team    Chief Complaint: Sleeping soundly in no distress       Interim History:     Patient observed in her room this morning, she is sleeping soundly in no distress. She has been taking her medications, behaviors continue to improve however thought process remains disorganized        Appetite:   [x] Normal/Unchanged  [] Increased  [] Decreased      Sleep:       [x] Normal/Unchanged  [] Fair       [] Poor              Energy:    [x] Normal/Unchanged  [] Increased  [] Decreased        SI [] Present  [x] Absent    HI  []Present  [x] Absent     Aggression:  [] yes  [x] no    Patient is [x] able  [] unable to CONTRACT FOR SAFETY     PAST MEDICAL/PSYCHIATRIC HISTORY:   Past Medical History:   Diagnosis Date    Brain aneurysm         Hypertension     Lumbar herniated disc        FAMILY/SOCIAL HISTORY:  No family history on file. Social History     Socioeconomic History    Marital status:      Spouse name: Not on file    Number of children: Not on file    Years of education: Not on file    Highest education level: Not on file   Occupational History    Not on file   Tobacco Use    Smoking status: Former     Types: Cigarettes     Quit date: 2009     Years since quittin.7    Smokeless tobacco: Not on file   Substance and Sexual Activity    Alcohol use: No    Drug use: No    Sexual activity: Yes   Other Topics Concern    Not on file   Social History Narrative    Not on file     Social Determinants of Health     Financial Resource Strain: Not on file   Food Insecurity: Not on file   Transportation Needs: Not on file   Physical Activity: Not on file   Stress: Not on file   Social Connections: Not on file   Intimate Partner Violence: Not on file   Housing Stability: Not on file           ROS:  [x] All negative/unchanged except if checked.  Explain positive(checked items) below:  [] Constitutional  [] Eyes  [] Ear/Nose/Mouth/Throat  [] Respiratory  [] CV  [] GI  []   [] Musculoskeletal  [] Skin/Breast  [] Neurological  [] Endocrine  [] Heme/Lymph  [] Allergic/Immunologic    Explanation:     MEDICATIONS:    Current Facility-Administered Medications:     divalproex (DEPAKOTE) DR tablet 500 mg, 500 mg, Oral, 2 times per day, Maricarmen Fallcar, APRN - CNP, 500 mg at 10/15/22 0913    OLANZapine zydis (ZYPREXA) disintegrating tablet 5 mg, 5 mg, Oral, BID, Maricarmen Fallow, APRN - CNP, 5 mg at 10/15/22 0913    atorvastatin (LIPITOR) tablet 80 mg, 80 mg, Oral, Nightly, Lamont Rajan MD, 80 mg at 10/14/22 2105    enalapril (VASOTEC) tablet 10 mg, 10 mg, Oral, Daily, Lamont Rajan MD, 10 mg at 10/15/22 0913    metoprolol succinate (TOPROL XL) extended release tablet 50 mg, 50 mg, Oral, Daily, Lamont Rajan MD, 50 mg at 10/15/22 0913    miconazole (MICOTIN) 2 % powder, , Topical, BID, Nathan Puente MD, Given at 10/13/22 2119    polyethylene glycol (GLYCOLAX) packet 17 g, 17 g, Oral, Daily PRN, Sue Rajan MD    potassium chloride (KLOR-CON M) extended release tablet 20 mEq, 20 mEq, Oral, BID WC, Lamont Rajan MD, 20 mEq at 10/15/22 0913    acetaminophen (TYLENOL) tablet 650 mg, 650 mg, Oral, Q4H PRN, Shila Lopez MD    magnesium hydroxide (MILK OF MAGNESIA) 400 MG/5ML suspension 30 mL, 30 mL, Oral, Daily PRN, Shila Lopez MD    nicotine (NICODERM CQ) 21 MG/24HR 1 patch, 1 patch, TransDERmal, Daily, Shila Lopez MD    aluminum & magnesium hydroxide-simethicone (MAALOX) 200-200-20 MG/5ML suspension 30 mL, 30 mL, Oral, PRN, Shila Lopez MD    hydrOXYzine pamoate (VISTARIL) capsule 50 mg, 50 mg, Oral, TID PRN, Shila Lopez MD, 50 mg at 10/14/22 2105    haloperidol (HALDOL) tablet 3 mg, 3 mg, Oral, Q6H PRN **OR** haloperidol lactate (HALDOL) injection 3 mg, 3 mg, IntraMUSCular, Q6H PRN, Shila Lopez MD, 3 mg at 10/11/22 1050      Examination:  BP 95/68   Pulse 60   Temp 99.6 °F (37.6 °C) (Temporal)   Resp 16   SpO2 97%   Gait - steady  Medication side effects(SE): none reported     Mental Status Examination:    Level of consciousness:  within normal limits   Appearance:  seated in bed  Behavior/Motor:  psychomotor agitation and agitated  Attitude toward examiner:  agitated   Speech:  rapid, loud, hyperverbal, and pressured   Mood: irritable and labile  Affect:  mood congruent  Thought processes:  incoherent   Thought content: Unable to assess  Cognition:  disoriented   Concentration distractible  Memory unable to assess  Insight, judgment, impulse control marginal at baseline       ASSESSMENT:   Patient symptoms are:  [] Well controlled  [] Improving  [] Worsening  [x] No change      Diagnosis:   Principal Problem:    Acute psychosis ([facility]  Resolved Problems:    * No resolved hospital problems. *      LABS:    No results for input(s): WBC, HGB, PLT in the last 72 hours. No results for input(s): NA, K, CL, CO2, BUN, CREATININE, GLUCOSE in the last 72 hours. No results for input(s): BILITOT, ALKPHOS, AST, ALT in the last 72 hours. Lab Results   Component Value Date/Time    LABAMPH NOT DETECTED 10/03/2022 12:22 PM    BARBSCNU NOT DETECTED 10/03/2022 12:22 PM    LABBENZ NOT DETECTED 10/03/2022 12:22 PM    LABMETH NOT DETECTED 10/03/2022 12:22 PM    OPIATESCREENURINE NOT DETECTED 10/03/2022 12:22 PM    PHENCYCLIDINESCREENURINE NOT DETECTED 10/03/2022 12:22 PM    ETOH <10 10/03/2022 12:22 PM     Lab Results   Component Value Date/Time    TSH 1.552 10/12/2010 01:20 PM     No results found for: LITHIUM  No results found for: VALPROATE, CBMZ        Treatment Plan:  The patient's diagnosis, treatment plan, medication management were formulated after patient was seen directly by the attending physician and myself and all relevant documentation was reviewed.     Risk, benefit, side effects, possible outcomes of the medication and alternatives discussed with the patient and the patient demonstrated understanding. The patient was also educated that the outcome of treatment will depend on the medication compliance as directed by the prescribers along with regular follow-up, compliance with the labs and other work-up, as clinically indicated. New Medications started during this admission :    Depakote 500 mg twice daily for mood stabilization and to reduce irritability agitation  Zyprexa  5 mg twice daily for augmentation of therapy  VPA level    Collateral information: Followed by social work   CD evaluation  Encourage patient to attend group and other milieu activities. Discharge planning discussed with the patient and treatment team.    PSYCHOTHERAPY/COUNSELING:  [x] Therapeutic interview  [x] Supportive  [] CBT  [] Ongoing  [] Other    [x] Patient continues to need, on a daily basis, active treatment furnished directly by or requiring the supervision of inpatient psychiatric personnel      Anticipated Length of stay: 3 to 5 days based on stability       NOTE: This report was transcribed using voice recognition software. Every effort was made to ensure accuracy; however, inadvertent computerized transcription errors may be present.     Electronically signed by REAGAN Mcgee CNP on 10/15/2022 at 10:34 AM

## 2022-10-15 NOTE — PLAN OF CARE
Problem: Depression  Goal: Will be euthymic at discharge  Description: INTERVENTIONS:  1. Administer medication as ordered  2. Provide emotional support via 1:1 interaction with staff  3. Encourage involvement in milieu/groups/activities  4. Monitor for social isolation  Outcome: Progressing     Problem: Behavior  Goal: Pt/Family maintain appropriate behavior and adhere to behavioral management agreement, if implemented  Description: INTERVENTIONS:  1. Assess patient/family's coping skills and  non-compliant behavior (including use of illegal substances)  2. Notify security of behavior or suspected illegal substances which indicate the need for search of the family and/or belongings  3. Encourage verbalization of thoughts and concerns in a socially appropriate manner  4. Utilize positive, consistent limit setting strategies supporting safety of patient, staff and others  5. Encourage participation in the decision making process about the behavioral management agreement  6. If a visitor's behavior poses a threat to safety call refer to organization policy. 7. Initiate consult with , Psychosocial CNS, Spiritual Care as appropriate  Outcome: Progressing     Problem: Anxiety  Goal: Will report anxiety at manageable levels  Description: INTERVENTIONS:  1. Administer medication as ordered  2. Teach and rehearse alternative coping skills  3. Provide emotional support with 1:1 interaction with staff  Outcome: Progressing     Problem: Sleep Disturbance  Goal: Will exhibit normal sleeping pattern  Description: INTERVENTIONS:  1. Administer medication as ordered  2. Decrease environmental stimuli, including noise, as appropriate  3. Discourage social isolation and naps during the day  Outcome: Progressing     Problem: Skin/Tissue Integrity  Goal: Absence of new skin breakdown  Description: 1. Monitor for areas of redness and/or skin breakdown  2. Assess vascular access sites hourly  3.   Every 4-6 hours minimum:  Change oxygen saturation probe site  4. Every 4-6 hours:  If on nasal continuous positive airway pressure, respiratory therapy assess nares and determine need for appliance change or resting period. Outcome: Progressing  Pt spent much of evening in day area watching television and socializing with peer. Pt denies SI/HI, AVH and states depression and anxiety are an 8/10. Pts sister called and spoke with this nurse, then pt called and spoke with sister on telephone. Pt is currently resting quietly in bed with eyes closed and even, unlabored respirations. Q 15 min rounds remain.

## 2022-10-15 NOTE — PROGRESS NOTES
Patient resting quiet to self at this time, respirations are even and unlabored, no signs or symptoms of distress or discomfort. Vistaril 50 mg po given with HS medications for anxiety. thus far this shift. Staff will continue to conduct environmental rounds to ensure the safety of everyone on the unit. Staff will provide support and interventions as requested or required.

## 2022-10-16 PROBLEM — F31.2 SEVERE MANIC BIPOLAR 1 DISORDER WITH PSYCHOTIC BEHAVIOR (HCC): Status: ACTIVE | Noted: 2022-10-16

## 2022-10-16 PROCEDURE — 1240000000 HC EMOTIONAL WELLNESS R&B

## 2022-10-16 PROCEDURE — 99232 SBSQ HOSP IP/OBS MODERATE 35: CPT | Performed by: NURSE PRACTITIONER

## 2022-10-16 PROCEDURE — 6370000000 HC RX 637 (ALT 250 FOR IP): Performed by: STUDENT IN AN ORGANIZED HEALTH CARE EDUCATION/TRAINING PROGRAM

## 2022-10-16 PROCEDURE — 6370000000 HC RX 637 (ALT 250 FOR IP): Performed by: NURSE PRACTITIONER

## 2022-10-16 PROCEDURE — 6370000000 HC RX 637 (ALT 250 FOR IP): Performed by: PSYCHIATRY & NEUROLOGY

## 2022-10-16 RX ADMIN — DIVALPROEX SODIUM 500 MG: 500 TABLET, DELAYED RELEASE ORAL at 21:42

## 2022-10-16 RX ADMIN — OLANZAPINE 5 MG: 5 TABLET, ORALLY DISINTEGRATING ORAL at 08:24

## 2022-10-16 RX ADMIN — ANTI-FUNGAL POWDER MICONAZOLE NITRATE TALC FREE: 1.42 POWDER TOPICAL at 21:44

## 2022-10-16 RX ADMIN — ATORVASTATIN CALCIUM 80 MG: 40 TABLET, FILM COATED ORAL at 21:42

## 2022-10-16 RX ADMIN — POTASSIUM CHLORIDE 20 MEQ: 1500 TABLET, EXTENDED RELEASE ORAL at 16:38

## 2022-10-16 RX ADMIN — OLANZAPINE 5 MG: 5 TABLET, ORALLY DISINTEGRATING ORAL at 21:43

## 2022-10-16 RX ADMIN — POTASSIUM CHLORIDE 20 MEQ: 1500 TABLET, EXTENDED RELEASE ORAL at 08:24

## 2022-10-16 RX ADMIN — METOPROLOL SUCCINATE 50 MG: 25 TABLET, EXTENDED RELEASE ORAL at 08:25

## 2022-10-16 RX ADMIN — ACETAMINOPHEN 650 MG: 325 TABLET, FILM COATED ORAL at 11:34

## 2022-10-16 RX ADMIN — ENALAPRIL MALEATE 10 MG: 10 TABLET ORAL at 08:25

## 2022-10-16 RX ADMIN — DIVALPROEX SODIUM 500 MG: 500 TABLET, DELAYED RELEASE ORAL at 08:25

## 2022-10-16 ASSESSMENT — PAIN SCALES - GENERAL
PAINLEVEL_OUTOF10: 0
PAINLEVEL_OUTOF10: 9

## 2022-10-16 ASSESSMENT — PAIN DESCRIPTION - DESCRIPTORS: DESCRIPTORS: ACHING

## 2022-10-16 ASSESSMENT — PAIN DESCRIPTION - LOCATION: LOCATION: LEG

## 2022-10-16 NOTE — PLAN OF CARE
Denies SI/HI   denies hallucinations   thoughts clearer this shift   out on the unit and social with select peers    appropriate conversation with staff    ambulating without assist of walker most of this shift in spite of reminders to use walker     occ delusional statement   cooperative with meds   will continue to monitor

## 2022-10-16 NOTE — PROGRESS NOTES
Patient resting quiet to self at this time, respirations are even and unlabored, no signs or symptoms of distress or discomfort. Pt refused all scheduled medications and no PRN medications given thus far this shift. Staff will continue to conduct environmental rounds to ensure the safety of everyone on the unit. Staff will provide support and interventions as requested or required.

## 2022-10-16 NOTE — PROGRESS NOTES
BEHAVIORAL HEALTH FOLLOW-UP NOTE     10/16/2022     Patient was seen and examined in person, Chart reviewed   Patient's case discussed with staff/team    Chief Complaint: \" Well my  left me\"      Interim History:     Patient observed in the day room this morning interacting with peers carrying on a conversation that is linear goal-directed. She is somewhat rambling at times has some underlying paranoia and underlying irritability. She is perseverating this morning on her  leaving her. She states that her life has changed and that she can only take it day by day and do the best she can. 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 He states that she is now residing in a nursing home and is unclear where she stands with her family. She is much more organized, having a goal-directed conversation with me she is able to provide me a little bit of her history and express her feelings. Prior to today she was unable to have any kind of organized conversation and demonstrated significant word salad and disorganization with unstable thought process. Today she does have some emotional lability but is largely improved. She is taking her medications.   Now that we have seen this level of improvement in the patient and I am able to get a little bit of history from her, we will change her diagnosis to severe manic bipolar 1 disorder with psychotic behavior      Appetite:   [x] Normal/Unchanged  [] Increased  [] Decreased      Sleep:       [x] Normal/Unchanged  [] Fair       [] Poor              Energy:    [x] Normal/Unchanged  [] Increased  [] Decreased        SI [] Present  [x] Absent    HI  []Present  [x] Absent     Aggression:  [] yes  [x] no    Patient is [x] able  [] unable to CONTRACT FOR SAFETY     PAST MEDICAL/PSYCHIATRIC HISTORY:   Past Medical History:   Diagnosis Date    Brain aneurysm     2009    Hypertension     Lumbar herniated disc        FAMILY/SOCIAL HISTORY:  No family history on file. Social History     Socioeconomic History    Marital status:      Spouse name: Not on file    Number of children: Not on file    Years of education: Not on file    Highest education level: Not on file   Occupational History    Not on file   Tobacco Use    Smoking status: Former     Types: Cigarettes     Quit date: 2009     Years since quittin.7    Smokeless tobacco: Not on file   Substance and Sexual Activity    Alcohol use: No    Drug use: No    Sexual activity: Yes   Other Topics Concern    Not on file   Social History Narrative    Not on file     Social Determinants of Health     Financial Resource Strain: Not on file   Food Insecurity: Not on file   Transportation Needs: Not on file   Physical Activity: Not on file   Stress: Not on file   Social Connections: Not on file   Intimate Partner Violence: Not on file   Housing Stability: Not on file           ROS:  [x] All negative/unchanged except if checked.  Explain positive(checked items) below:  [] Constitutional  [] Eyes  [] Ear/Nose/Mouth/Throat  [] Respiratory  [] CV  [] GI  []   [] Musculoskeletal  [] Skin/Breast  [] Neurological  [] Endocrine  [] Heme/Lymph  [] Allergic/Immunologic    Explanation:     MEDICATIONS:    Current Facility-Administered Medications:     divalproex (DEPAKOTE) DR tablet 500 mg, 500 mg, Oral, 2 times per day, Kelsie Dancer, APRN - CNP, 500 mg at 10/16/22 0825    OLANZapine zydis (ZYPREXA) disintegrating tablet 5 mg, 5 mg, Oral, BID, Kelsie Dancer, APRN - CNP, 5 mg at 10/16/22 0824    atorvastatin (LIPITOR) tablet 80 mg, 80 mg, Oral, Nightly, Lamont Rajan MD, 80 mg at 10/14/22 2105    enalapril (VASOTEC) tablet 10 mg, 10 mg, Oral, Daily, Lamont Rajan MD, 10 mg at 10/16/22 08    metoprolol succinate (TOPROL XL) extended release tablet 50 mg, 50 mg, Oral, Daily, Lamont Rajan MD, 50 mg at 10/16/22 0825    miconazole (MICOTIN) 2 % powder, , Topical, BID, Lamont MD Mohini, Given at 10/13/22 2119    polyethylene glycol (GLYCOLAX) packet 17 g, 17 g, Oral, Daily PRN, Ravin Escobar MD    potassium chloride (KLOR-CON M) extended release tablet 20 mEq, 20 mEq, Oral, BID WC, Lamont Rajan MD, 20 mEq at 10/16/22 4842    acetaminophen (TYLENOL) tablet 650 mg, 650 mg, Oral, Q4H PRN, Jamal Will MD, 650 mg at 10/16/22 1134    magnesium hydroxide (MILK OF MAGNESIA) 400 MG/5ML suspension 30 mL, 30 mL, Oral, Daily PRN, Jamal Will MD    nicotine (NICODERM CQ) 21 MG/24HR 1 patch, 1 patch, TransDERmal, Daily, Jamal Will MD    aluminum & magnesium hydroxide-simethicone (MAALOX) 200-200-20 MG/5ML suspension 30 mL, 30 mL, Oral, PRN, Jamal Will MD    hydrOXYzine pamoate (VISTARIL) capsule 50 mg, 50 mg, Oral, TID PRN, Jamal Will MD, 50 mg at 10/14/22 2105    haloperidol (HALDOL) tablet 3 mg, 3 mg, Oral, Q6H PRN **OR** haloperidol lactate (HALDOL) injection 3 mg, 3 mg, IntraMUSCular, Q6H PRN, Jamal Will MD, 3 mg at 10/11/22 1050      Examination:  BP (!) 151/74   Pulse 56   Temp 97.6 °F (36.4 °C) (Temporal)   Resp 16   Ht 5' 4\" (1.626 m)   Wt 205 lb (93 kg)   SpO2 97%   BMI 35.19 kg/m²   Gait - steady  Medication side effects(SE): none reported     Mental Status Examination:    Level of consciousness:  within normal limits   Appearance:  seated in the room  Behavior/Motor: No abnormality  Attitude toward examiner: Calm cooperative  Speech: Normal rate and tone  Mood: labile  Affect:  mood congruent  Thought processes: Linear  Thought content: Devoid of any auditory or visual hallucinations some paranoia  Cognition: Oriented x3  Concentration distractible  Memory unable to assess  Insight, judgment, impulse control improving       ASSESSMENT:   Patient symptoms are:  [] Well controlled  [x] Improving  [] Worsening  [] No change      Diagnosis:   Principal Problem:    Severe manic bipolar 1 disorder with psychotic behavior [facility])  Resolved Problems:    * No resolved hospital problems. *      LABS:    No results for input(s): WBC, HGB, PLT in the last 72 hours. No results for input(s): NA, K, CL, CO2, BUN, CREATININE, GLUCOSE in the last 72 hours. No results for input(s): BILITOT, ALKPHOS, AST, ALT in the last 72 hours. Lab Results   Component Value Date/Time    LABAMPH NOT DETECTED 10/03/2022 12:22 PM    BARBSCNU NOT DETECTED 10/03/2022 12:22 PM    LABBENZ NOT DETECTED 10/03/2022 12:22 PM    LABMETH NOT DETECTED 10/03/2022 12:22 PM    OPIATESCREENURINE NOT DETECTED 10/03/2022 12:22 PM    PHENCYCLIDINESCREENURINE NOT DETECTED 10/03/2022 12:22 PM    ETOH <10 10/03/2022 12:22 PM     Lab Results   Component Value Date/Time    TSH 1.552 10/12/2010 01:20 PM     No results found for: LITHIUM  No results found for: VALPROATE, CBMZ        Treatment Plan:  The patient's diagnosis, treatment plan, medication management were formulated after patient was seen directly by the attending physician and myself and all relevant documentation was reviewed. Risk, benefit, side effects, possible outcomes of the medication and alternatives discussed with the patient and the patient demonstrated understanding. The patient was also educated that the outcome of treatment will depend on the medication compliance as directed by the prescribers along with regular follow-up, compliance with the labs and other work-up, as clinically indicated. New Medications started during this admission :    Depakote 500 mg twice daily for mood stabilization and to reduce irritability agitation  Zyprexa  5 mg twice daily for augmentation of therapy  VPA level    Collateral information: Followed by social work   CD evaluation  Encourage patient to attend group and other milieu activities.   Discharge planning discussed with the patient and treatment team.    PSYCHOTHERAPY/COUNSELING:  [x] Therapeutic interview  [x] Supportive  [] CBT  [] Ongoing  [] Other    [x] Patient continues to need, on a daily basis, active treatment furnished directly by or requiring the supervision of inpatient psychiatric personnel      Anticipated Length of stay: 3 to 5 days based on stability       NOTE: This report was transcribed using voice recognition software. Every effort was made to ensure accuracy; however, inadvertent computerized transcription errors may be present.     Electronically signed by REAGAN Garza CNP on 10/16/2022 at 12:12 PM

## 2022-10-16 NOTE — GROUP NOTE
Group Therapy Note    Date: 10/16/2022    Group Start Time: 3849  Group End Time: 1625  Group Topic: Cognitive Skills    [facility]    ERNESTO Dudley LSW        Group Therapy Note    Attendees: 7       Patient's Goal:  Pt will be able to verbalize different ways to show gratitude. Notes:  Pt made connections and participated in group. Status After Intervention:  Improved    Participation Level:  Active Listener and Interactive    Participation Quality: Appropriate, Attentive, Sharing, and Supportive      Speech:  normal      Thought Process/Content: Logical  Linear      Affective Functioning: Congruent      Mood: depressed      Level of consciousness:  Alert and Attentive      Response to Learning: Able to verbalize current knowledge/experience      Endings: None Reported    Modes of Intervention: Education, Support, Socialization, and Exploration      Discipline Responsible: /Counselor      Signature:  ERNESTO Dudley LSW

## 2022-10-16 NOTE — PLAN OF CARE
Problem: Self Harm/Suicidality  Goal: Will have no self-injury during hospital stay  Description: INTERVENTIONS:  1. Q 30 MINUTES: Routine safety checks  2. Q SHIFT & PRN: Assess risk to determine if routine checks are adequate to maintain patient safety  Outcome: Progressing     Problem: Behavior  Goal: Pt/Family maintain appropriate behavior and adhere to behavioral management agreement, if implemented  Description: INTERVENTIONS:  1. Assess patient/family's coping skills and  non-compliant behavior (including use of illegal substances)  2. Notify security of behavior or suspected illegal substances which indicate the need for search of the family and/or belongings  3. Encourage verbalization of thoughts and concerns in a socially appropriate manner  4. Utilize positive, consistent limit setting strategies supporting safety of patient, staff and others  5. Encourage participation in the decision making process about the behavioral management agreement  6. If a visitor's behavior poses a threat to safety call refer to organization policy. 7. Initiate consult with , Psychosocial CNS, Spiritual Care as appropriate  Outcome: Progressing     Problem: Anxiety  Goal: Will report anxiety at manageable levels  Description: INTERVENTIONS:  1. Administer medication as ordered  2. Teach and rehearse alternative coping skills  3. Provide emotional support with 1:1 interaction with staff  Outcome: Progressing     Problem: Sleep Disturbance  Goal: Will exhibit normal sleeping pattern  Description: INTERVENTIONS:  1. Administer medication as ordered  2. Decrease environmental stimuli, including noise, as appropriate  3. Discourage social isolation and naps during the day  Outcome: Progressing     Problem: Involuntary Admit  Goal: Will cooperate with staff recommendations and doctor's orders and will demonstrate appropriate behavior  Description: INTERVENTIONS:  1.  Treat underlying conditions and offer medication as ordered  2. Educate regarding involuntary admission procedures and rules  3. Contain excessive/inappropriate behavior per unit and hospital policies  Outcome: Progressing   Pt is in bed, resting, occasionally yelling out. Problem: Self Harm/Suicidality  Goal: Will have no self-injury during hospital stay  Description: INTERVENTIONS:  1. Q 30 MINUTES: Routine safety checks  2. Q SHIFT & PRN: Assess risk to determine if routine checks are adequate to maintain patient safety  Outcome: Progressing     Problem: Behavior  Goal: Pt/Family maintain appropriate behavior and adhere to behavioral management agreement, if implemented  Description: INTERVENTIONS:  1. Assess patient/family's coping skills and  non-compliant behavior (including use of illegal substances)  2. Notify security of behavior or suspected illegal substances which indicate the need for search of the family and/or belongings  3. Encourage verbalization of thoughts and concerns in a socially appropriate manner  4. Utilize positive, consistent limit setting strategies supporting safety of patient, staff and others  5. Encourage participation in the decision making process about the behavioral management agreement  6. If a visitor's behavior poses a threat to safety call refer to organization policy. 7. Initiate consult with , Psychosocial CNS, Spiritual Care as appropriate  Outcome: Progressing     Problem: Anxiety  Goal: Will report anxiety at manageable levels  Description: INTERVENTIONS:  1. Administer medication as ordered  2. Teach and rehearse alternative coping skills  3. Provide emotional support with 1:1 interaction with staff  Outcome: Progressing     Problem: Sleep Disturbance  Goal: Will exhibit normal sleeping pattern  Description: INTERVENTIONS:  1. Administer medication as ordered  2. Decrease environmental stimuli, including noise, as appropriate  3.  Discourage social isolation and naps during the day  Outcome: Progressing     Problem: Involuntary Admit  Goal: Will cooperate with staff recommendations and doctor's orders and will demonstrate appropriate behavior  Description: INTERVENTIONS:  1. Treat underlying conditions and offer medication as ordered  2. Educate regarding involuntary admission procedures and rules  3. Contain excessive/inappropriate behavior per unit and hospital policies  Outcome: Progressing   Pt is in bed resting, occasionally yelling out. Pt refused medications and yelled out at the medication nurse. Pt at times is non-sensical and and at other times is coherent in her conversation and answers. Q 15 min rounds continue.

## 2022-10-16 NOTE — GROUP NOTE
Group Therapy Note    Date: 10/16/2022    Group Start Time: 6157  Group End Time: 1545  Group Topic: Cognitive Skills    [facility]    Thankful ERNESTO Bolden, JUAN F        Group Therapy Note    Attendees: 8       Patient's Goal:  Pt attended community support meeting and was able to identify their daily goal as, \"to come to group. \"       Notes:  Pt was an active participant in group therapy. We discussed Fair Fighting Rules and how to effectively communication in a healthy way. Status After Intervention:  Improved    Participation Level: Active Listener    Participation Quality: Attentive      Speech:  normal      Thought Process/Content: Flight of ideas      Affective Functioning: Congruent      Mood: euthymic      Level of consciousness:  Alert and Attentive      Response to Learning: Able to verbalize current knowledge/experience and Able to verbalize/acknowledge new learning      Endings: None Reported    Modes of Intervention: Education, Support, Socialization, Exploration, Clarifying, and Problem-solving      Discipline Responsible: /Counselor      Signature:   ERNESTO Marshall LSW

## 2022-10-17 PROCEDURE — 99232 SBSQ HOSP IP/OBS MODERATE 35: CPT | Performed by: NURSE PRACTITIONER

## 2022-10-17 PROCEDURE — 6370000000 HC RX 637 (ALT 250 FOR IP): Performed by: NURSE PRACTITIONER

## 2022-10-17 PROCEDURE — 1240000000 HC EMOTIONAL WELLNESS R&B

## 2022-10-17 PROCEDURE — 6370000000 HC RX 637 (ALT 250 FOR IP): Performed by: STUDENT IN AN ORGANIZED HEALTH CARE EDUCATION/TRAINING PROGRAM

## 2022-10-17 RX ADMIN — POTASSIUM CHLORIDE 20 MEQ: 1500 TABLET, EXTENDED RELEASE ORAL at 09:07

## 2022-10-17 RX ADMIN — ANTI-FUNGAL POWDER MICONAZOLE NITRATE TALC FREE: 1.42 POWDER TOPICAL at 21:22

## 2022-10-17 RX ADMIN — OLANZAPINE 5 MG: 5 TABLET, ORALLY DISINTEGRATING ORAL at 09:07

## 2022-10-17 RX ADMIN — OLANZAPINE 5 MG: 5 TABLET, ORALLY DISINTEGRATING ORAL at 21:20

## 2022-10-17 RX ADMIN — DIVALPROEX SODIUM 500 MG: 500 TABLET, DELAYED RELEASE ORAL at 09:08

## 2022-10-17 RX ADMIN — ATORVASTATIN CALCIUM 80 MG: 40 TABLET, FILM COATED ORAL at 21:20

## 2022-10-17 RX ADMIN — ENALAPRIL MALEATE 10 MG: 10 TABLET ORAL at 09:07

## 2022-10-17 RX ADMIN — METOPROLOL SUCCINATE 50 MG: 25 TABLET, EXTENDED RELEASE ORAL at 09:08

## 2022-10-17 RX ADMIN — POTASSIUM CHLORIDE 20 MEQ: 1500 TABLET, EXTENDED RELEASE ORAL at 17:33

## 2022-10-17 RX ADMIN — DIVALPROEX SODIUM 500 MG: 500 TABLET, DELAYED RELEASE ORAL at 21:20

## 2022-10-17 ASSESSMENT — PAIN DESCRIPTION - FREQUENCY: FREQUENCY: CONTINUOUS

## 2022-10-17 ASSESSMENT — PAIN DESCRIPTION - ORIENTATION: ORIENTATION: RIGHT;LEFT

## 2022-10-17 ASSESSMENT — PAIN DESCRIPTION - DESCRIPTORS: DESCRIPTORS: DISCOMFORT

## 2022-10-17 ASSESSMENT — PAIN DESCRIPTION - LOCATION: LOCATION: KNEE

## 2022-10-17 ASSESSMENT — PAIN SCALES - GENERAL: PAINLEVEL_OUTOF10: 5

## 2022-10-17 NOTE — GROUP NOTE
Group Therapy Note    Date: 10/17/2022    Group Start Time: 8401  Group End Time: 0310  Group Topic: Psychoeducation    [facility]    [place], [place]                                                                        Group Therapy Note    Date: 10/17/2022    Wellness Binder Information  Module Name:  steps to increasing happiness   Patient's Goal:  Patient will be able to id 3 steps one can take to maximize his/her own happiness. Notes:  Pleasant and sharing in group. Willing to participate appropriate and give input. Status After Intervention:  Improved    Participation Level:  Active Listener and Interactive    Participation Quality: Appropriate, Attentive, and Sharing      Speech:  normal      Thought Process/Content: Logical      Affective Functioning: Congruent      Mood: euthymic      Level of consciousness:  Alert, Oriented x4, and Attentive      Response to Learning: Able to verbalize/acknowledge new learning, Able to retain information, and Progressing to goal      Endings: None Reported    Modes of Intervention: Education, Support, Socialization, Exploration, and Problem-solving      Discipline Responsible: Psychoeducational Specialist      Signature:  Carine Ling

## 2022-10-17 NOTE — GROUP NOTE
Group Therapy Note    Date: 10/17/2022    Group Start Time: 1100  Group End Time: 1130  Group Topic: Cognitive Skills    [facility]    ERNESTO Correia, JUAN F        Group Therapy Note    Attendees: 8       Patient's Goal: To participate in group discussion on positive steps to well being. Notes: pt was an active participant in group. Status After Intervention:  Improved    Participation Level:  Active Listener and Interactive    Participation Quality: Appropriate, Attentive, and Sharing      Speech:  normal      Thought Process/Content: Logical      Affective Functioning: Congruent      Mood: anxious      Level of consciousness:  Alert and Oriented x4      Response to Learning: Able to verbalize current knowledge/experience      Endings: None Reported    Modes of Intervention: Education, Support, Socialization, Exploration, Clarifying, and Problem-solving      Discipline Responsible: /Counselor      Signature:  ERNESTO Walker, JUAN F

## 2022-10-17 NOTE — CARE COORDINATION
Statement of Expert Evaluation to be mailed to family once completed. Plan is for patient to return back to nursing facility.     Electronically signed by ERNESTO Collado, JUAN F on 10/17/2022 at 4:12 PM

## 2022-10-17 NOTE — PROGRESS NOTES
Spoke at length with patient about signing MANAN with family members. She states she will only sign for Yulissa Stockton only after she speaks with her to confirm it is her. Patient is paranoid that someone is changing her meds but she willing to take them.

## 2022-10-17 NOTE — PROGRESS NOTES
BEHAVIORAL HEALTH FOLLOW-UP NOTE     10/17/2022     Patient was seen and examined in person, Chart reviewed   Patient's case discussed with staff/team    Chief Complaint: \"My toes are cold\"      Interim History:     Patient observed in the day room this morning interacting with peers and staff. She is more linear, she knows its October and that she is at [facility] ([facility]) she makes good eye contact with me, has mostly linear conversation, slightly tangential. She is telling me that she is cold. She knows her RN is Eda and that Eda will be helping her with the elastic band in her socks. She is taking her medications. No suicidal or homicidal behavior, no delusions stated though staff report that she does make bizarre statements or nonsensical statements at times. Now that we have seen this level of improvement in the patient and I am able to get a little bit of history from her, we will change her diagnosis to severe manic bipolar 1 disorder with psychotic behavior      Appetite:   [x] Normal/Unchanged  [] Increased  [] Decreased      Sleep:       [x] Normal/Unchanged  [] Fair       [] Poor              Energy:    [x] Normal/Unchanged  [] Increased  [] Decreased        SI [] Present  [x] Absent    HI  []Present  [x] Absent     Aggression:  [] yes  [x] no    Patient is [x] able  [] unable to CONTRACT FOR SAFETY     PAST MEDICAL/PSYCHIATRIC HISTORY:   Past Medical History:   Diagnosis Date    Brain aneurysm         Hypertension     Lumbar herniated disc        FAMILY/SOCIAL HISTORY:  No family history on file.   Social History     Socioeconomic History    Marital status:      Spouse name: Not on file    Number of children: Not on file    Years of education: Not on file    Highest education level: Not on file   Occupational History    Not on file   Tobacco Use    Smoking status: Former     Types: Cigarettes     Quit date: 2009     Years since quittin.7    Smokeless tobacco: Not on file Substance and Sexual Activity    Alcohol use: No    Drug use: No    Sexual activity: Yes   Other Topics Concern    Not on file   Social History Narrative    Not on file     Social Determinants of Health     Financial Resource Strain: Not on file   Food Insecurity: Not on file   Transportation Needs: Not on file   Physical Activity: Not on file   Stress: Not on file   Social Connections: Not on file   Intimate Partner Violence: Not on file   Housing Stability: Not on file           ROS:  [x] All negative/unchanged except if checked.  Explain positive(checked items) below:  [] Constitutional  [] Eyes  [] Ear/Nose/Mouth/Throat  [] Respiratory  [] CV  [] GI  []   [] Musculoskeletal  [] Skin/Breast  [] Neurological  [] Endocrine  [] Heme/Lymph  [] Allergic/Immunologic    Explanation:     MEDICATIONS:    Current Facility-Administered Medications:     divalproex (DEPAKOTE) DR tablet 500 mg, 500 mg, Oral, 2 times per day, REAGAN Lopez - CNP, 500 mg at 10/16/22 2142    OLANZapine zydis (ZYPREXA) disintegrating tablet 5 mg, 5 mg, Oral, BID, REAGAN Lopez - CNP, 5 mg at 10/16/22 2143    atorvastatin (LIPITOR) tablet 80 mg, 80 mg, Oral, Nightly, Lamont Rajan MD, 80 mg at 10/16/22 2142    enalapril (VASOTEC) tablet 10 mg, 10 mg, Oral, Daily, Lamont Rajan MD, 10 mg at 10/16/22 0825    metoprolol succinate (TOPROL XL) extended release tablet 50 mg, 50 mg, Oral, Daily, Lamont Rajan MD, 50 mg at 10/16/22 0825    miconazole (MICOTIN) 2 % powder, , Topical, BID, Lamont Rajan MD, Given at 10/16/22 2144    polyethylene glycol (GLYCOLAX) packet 17 g, 17 g, Oral, Daily PRN, Lamont Rajan MD    potassium chloride (KLOR-CON M) extended release tablet 20 mEq, 20 mEq, Oral, BID WC, Lamont Rajan MD, 20 mEq at 10/16/22 1638    acetaminophen (TYLENOL) tablet 650 mg, 650 mg, Oral, Q4H PRN, Beverly Kuhn MD, 650 mg at 10/16/22 1134    magnesium hydroxide (MILK OF MAGNESIA) 400 MG/5ML suspension 30 mL, 30 mL, Oral, Daily PRN, Balbina Vera MD    nicotine (NICODERM CQ) 21 MG/24HR 1 patch, 1 patch, TransDERmal, Daily, Balbina Vera MD    aluminum & magnesium hydroxide-simethicone (MAALOX) 200-200-20 MG/5ML suspension 30 mL, 30 mL, Oral, PRN, Balbina Vera MD    hydrOXYzine pamoate (VISTARIL) capsule 50 mg, 50 mg, Oral, TID PRN, Balbina Vera MD, 50 mg at 10/14/22 2105    haloperidol (HALDOL) tablet 3 mg, 3 mg, Oral, Q6H PRN **OR** haloperidol lactate (HALDOL) injection 3 mg, 3 mg, IntraMUSCular, Q6H PRN, Balbina Vera MD, 3 mg at 10/11/22 1050      Examination:  /71   Pulse 61   Temp 97 °F (36.1 °C) (Temporal)   Resp 16   Ht 5' 4\" (1.626 m)   Wt 205 lb (93 kg)   SpO2 95%   BMI 35.19 kg/m²   Gait - steady  Medication side effects(SE): none reported     Mental Status Examination:    Level of consciousness:  within normal limits   Appearance:  seated in the room  Behavior/Motor: No abnormality  Attitude toward examiner: Calm cooperative  Speech: Normal rate and tone  Mood: labile  Affect:  mood congruent  Thought processes: Linear  Thought content: Devoid of any auditory or visual hallucinations some paranoia  Cognition: Oriented x3  Concentration distractible  Memory unable to assess  Insight, judgment, impulse control improving       ASSESSMENT:   Patient symptoms are:  [] Well controlled  [x] Improving  [] Worsening  [] No change      Diagnosis:   Principal Problem:    Severe manic bipolar 1 disorder with psychotic behavior ([facility] [address]  Resolved Problems:    * No resolved hospital problems. *      LABS:    No results for input(s): WBC, HGB, PLT in the last 72 hours. No results for input(s): NA, K, CL, CO2, BUN, CREATININE, GLUCOSE in the last 72 hours. No results for input(s): BILITOT, ALKPHOS, AST, ALT in the last 72 hours.   Lab Results   Component Value Date/Time    LABAMPH NOT DETECTED 10/03/2022 12:22 PM    BARBSCNU NOT DETECTED 10/03/2022 12:22 PM    LABPAM NOT DETECTED 10/03/2022 12:22 PM    LABMETH NOT DETECTED 10/03/2022 12:22 PM    OPIATESCREENURINE NOT DETECTED 10/03/2022 12:22 PM    PHENCYCLIDINESCREENURINE NOT DETECTED 10/03/2022 12:22 PM    ETOH <10 10/03/2022 12:22 PM     Lab Results   Component Value Date/Time    TSH 1.552 10/12/2010 01:20 PM     No results found for: LITHIUM  No results found for: VALPROATE, CBMZ        Treatment Plan:  The patient's diagnosis, treatment plan, medication management were formulated after patient was seen directly by the attending physician and myself and all relevant documentation was reviewed. Risk, benefit, side effects, possible outcomes of the medication and alternatives discussed with the patient and the patient demonstrated understanding. The patient was also educated that the outcome of treatment will depend on the medication compliance as directed by the prescribers along with regular follow-up, compliance with the labs and other work-up, as clinically indicated. New Medications started during this admission :    Depakote 500 mg twice daily for mood stabilization and to reduce irritability agitation  Zyprexa  5 mg twice daily for augmentation of therapy  VPA level    Collateral information: Followed by social work   CD evaluation  Encourage patient to attend group and other milieu activities. Discharge planning discussed with the patient and treatment team.    PSYCHOTHERAPY/COUNSELING:  [x] Therapeutic interview  [x] Supportive  [] CBT  [] Ongoing  [] Other    [x] Patient continues to need, on a daily basis, active treatment furnished directly by or requiring the supervision of inpatient psychiatric personnel      NOTE: This report was transcribed using voice recognition software. Every effort was made to ensure accuracy; however, inadvertent computerized transcription errors may be present.       Behavioral Services  Medicare Certification Upon Admission    I certify that this patient's inpatient psychiatric

## 2022-10-17 NOTE — PLAN OF CARE
Alert and oriented x4 patient started morning off irritable and not wanting to talk with staff. After eating breakfast she was pleasant and cooperative. She denies SI/HI does not report any auditory or visual hallucinations. She continued to brighten with conversation and has good eye contact. States she will willing to return to [facility] at discharge but has to talk with when she gets there because she know more of her belongs are missing as that happens frequently. Patient ambulates with a walker gait steady. Problem: Self Harm/Suicidality  Goal: Will have no self-injury during hospital stay  Description: INTERVENTIONS:  1. Q 30 MINUTES: Routine safety checks  2. Q SHIFT & PRN: Assess risk to determine if routine checks are adequate to maintain patient safety  10/17/2022 1032 by Layne Nation RN  Outcome: Progressing     Problem: Depression  Goal: Will be euthymic at discharge  Description: INTERVENTIONS:  1. Administer medication as ordered  2. Provide emotional support via 1:1 interaction with staff  3. Encourage involvement in milieu/groups/activities  4. Monitor for social isolation  10/17/2022 1032 by Layne Nation RN  Outcome: Progressing     Problem: Psychosis  Goal: Will report no hallucinations or delusions  Description: INTERVENTIONS:  1. Administer medication as  ordered  2. Assist with reality testing to support increasing orientation  3. Assess if patient's hallucinations or delusions are encouraging self harm or harm to others and intervene as appropriate  10/17/2022 1032 by Layne Nation RN  Outcome: Progressing     Problem: Behavior  Goal: Pt/Family maintain appropriate behavior and adhere to behavioral management agreement, if implemented  Description: INTERVENTIONS:  1. Assess patient/family's coping skills and  non-compliant behavior (including use of illegal substances)  2.  Notify security of behavior or suspected illegal substances which indicate the need for search of the family and/or belongings  3. Encourage verbalization of thoughts and concerns in a socially appropriate manner  4. Utilize positive, consistent limit setting strategies supporting safety of patient, staff and others  5. Encourage participation in the decision making process about the behavioral management agreement  6. If a visitor's behavior poses a threat to safety call refer to organization policy. 7. Initiate consult with , Psychosocial CNS, Spiritual Care as appropriate  Outcome: Progressing  Flowsheets (Taken 10/17/2022 1017)  Patient/family maintains appropriate behavior and adheres to behavioral management agreement, if implemented:   Assess patient/familys coping skills and  non-compliant behavior (including use of illegal substances)   Encourage verbalization of thoughts and concerns in a socially appropriate manner     Problem: Anxiety  Goal: Will report anxiety at manageable levels  Description: INTERVENTIONS:  1. Administer medication as ordered  2. Teach and rehearse alternative coping skills  3.  Provide emotional support with 1:1 interaction with staff  10/17/2022 1032 by Yosvany Suero RN  Outcome: Progressing  Flowsheets (Taken 10/17/2022 1017)  Will report anxiety at manageable levels: Teach and rehearse alternative coping skills

## 2022-10-17 NOTE — PLAN OF CARE
Problem: Self Harm/Suicidality  Goal: Will have no self-injury during hospital stay  Description: INTERVENTIONS:  1. Q 30 MINUTES: Routine safety checks  2. Q SHIFT & PRN: Assess risk to determine if routine checks are adequate to maintain patient safety  Outcome: Progressing     Problem: Depression  Goal: Will be euthymic at discharge  Description: INTERVENTIONS:  1. Administer medication as ordered  2. Provide emotional support via 1:1 interaction with staff  3. Encourage involvement in milieu/groups/activities  4. Monitor for social isolation  Outcome: Progressing     Problem: Psychosis  Goal: Will report no hallucinations or delusions  Description: INTERVENTIONS:  1. Administer medication as  ordered  2. Assist with reality testing to support increasing orientation  3. Assess if patient's hallucinations or delusions are encouraging self harm or harm to others and intervene as appropriate  Outcome: Progressing     Problem: Anxiety  Goal: Will report anxiety at manageable levels  Description: INTERVENTIONS:  1. Administer medication as ordered  2. Teach and rehearse alternative coping skills  3. Provide emotional support with 1:1 interaction with staff  Outcome: Progressing   Pt is alert and awake in bed. Pt is friendly and cooperative with medication, denies pain and thoughts of SI/HI. Pt states that depression and anxiety are approximately 7/10. Pt remains labile, but has been calm and cooperative this shift. Q 15 min remain.

## 2022-10-17 NOTE — PROGRESS NOTES
Attended afternoon community meeting. Updated on staffing and daily expectations. Shared goal for the day as to get warm.

## 2022-10-17 NOTE — CARE COORDINATION
SW met with pt. Pt stated it seems like she draws negativity towards her because she was sitting by herself by the window and then everyone came out for lunch. Pt reports she tried to call her sister but the phone cords are too short. Pt reports she cant talk with her brother and sister in law because her sister in law wants to be in the same situation as her. Pt reports she also cant talk with them because they put her on speaker and she cant talk while on speaker phone. Pt reports she will try to call her sister again if she can use a different phone or if she can have a longer phone cord. Pt cooperative, anxious, tangential, fair eye contact, clear speech, poor insight/judgement.

## 2022-10-18 VITALS
HEART RATE: 75 BPM | BODY MASS INDEX: 35 KG/M2 | SYSTOLIC BLOOD PRESSURE: 126 MMHG | DIASTOLIC BLOOD PRESSURE: 78 MMHG | OXYGEN SATURATION: 95 % | RESPIRATION RATE: 16 BRPM | TEMPERATURE: 97.6 F | HEIGHT: 64 IN | WEIGHT: 205 LBS

## 2022-10-18 PROCEDURE — 99239 HOSP IP/OBS DSCHRG MGMT >30: CPT | Performed by: NURSE PRACTITIONER

## 2022-10-18 PROCEDURE — 6370000000 HC RX 637 (ALT 250 FOR IP): Performed by: STUDENT IN AN ORGANIZED HEALTH CARE EDUCATION/TRAINING PROGRAM

## 2022-10-18 PROCEDURE — 6370000000 HC RX 637 (ALT 250 FOR IP): Performed by: NURSE PRACTITIONER

## 2022-10-18 RX ORDER — POTASSIUM CHLORIDE 20 MEQ/1
20 TABLET, EXTENDED RELEASE ORAL 2 TIMES DAILY WITH MEALS
Qty: 60 TABLET | Refills: 0
Start: 2022-10-18

## 2022-10-18 RX ORDER — DIVALPROEX SODIUM 500 MG/1
500 TABLET, DELAYED RELEASE ORAL EVERY 12 HOURS SCHEDULED
Qty: 90 TABLET | Refills: 3
Start: 2022-10-18

## 2022-10-18 RX ORDER — OLANZAPINE 5 MG/1
5 TABLET, ORALLY DISINTEGRATING ORAL 2 TIMES DAILY
Qty: 30 TABLET | Refills: 3
Start: 2022-10-18

## 2022-10-18 RX ADMIN — POTASSIUM CHLORIDE 20 MEQ: 1500 TABLET, EXTENDED RELEASE ORAL at 10:22

## 2022-10-18 RX ADMIN — DIVALPROEX SODIUM 500 MG: 500 TABLET, DELAYED RELEASE ORAL at 10:22

## 2022-10-18 RX ADMIN — ANTI-FUNGAL POWDER MICONAZOLE NITRATE TALC FREE: 1.42 POWDER TOPICAL at 10:27

## 2022-10-18 RX ADMIN — ENALAPRIL MALEATE 10 MG: 10 TABLET ORAL at 10:23

## 2022-10-18 RX ADMIN — METOPROLOL SUCCINATE 50 MG: 25 TABLET, EXTENDED RELEASE ORAL at 10:17

## 2022-10-18 RX ADMIN — OLANZAPINE 5 MG: 5 TABLET, ORALLY DISINTEGRATING ORAL at 10:23

## 2022-10-18 ASSESSMENT — PAIN SCALES - GENERAL
PAINLEVEL_OUTOF10: 8
PAINLEVEL_OUTOF10: 0

## 2022-10-18 ASSESSMENT — PAIN DESCRIPTION - LOCATION: LOCATION: SHOULDER

## 2022-10-18 ASSESSMENT — PAIN DESCRIPTION - DESCRIPTORS: DESCRIPTORS: DISCOMFORT

## 2022-10-18 ASSESSMENT — PAIN DESCRIPTION - ORIENTATION: ORIENTATION: RIGHT;LEFT

## 2022-10-18 NOTE — PLAN OF CARE
Problem: Anxiety  Goal: Will report anxiety at manageable levels  Description: INTERVENTIONS:  1. Administer medication as ordered  2. Teach and rehearse alternative coping skills  3. Provide emotional support with 1:1 interaction with staff  10/17/2022 2121 by Rah Henderson RN  Outcome: Progressng  Problem: Sleep Disturbance  Goal: Will exhibit normal sleeping pattern  Description: INTERVENTIONS:  1. Administer medication as ordered  2. Decrease environmental stimuli, including noise, as appropriate  3. Discourage social isolation and naps during the day  Outcome: Progressing  Problem: Xiomara  Goal: Will exhibit normal sleep and speech and no impulsivity  Description: INTERVENTIONS:  1. Administer medication as ordered  2. Set limits on impulsive behavior  3. Make attempts to decrease external stimuli as possible  Outcome: Progressing     Problem: Depression  Goal: Will be euthymic at discharge  Description: INTERVENTIONS:  1. Administer medication as ordered  2. Provide emotional support via 1:1 interaction with staff  3. Encourage involvement in milieu/groups/activities  4. Monitor for social isolation  10/17/2022 2121 by Rah Henderson RN  Outcome: Progressing     Problem: Chronic Conditions and Co-morbidities  Goal: Patient's chronic conditions and co-morbidity symptoms are monitored and maintained or improved  Outcome: Progressing  Flowsheets (Taken 10/17/2022 1017 by Marcelo Nance RN)  Care Plan - Patient's Chronic Conditions and Co-Morbidity Symptoms are Monitored and Maintained or Improved: Monitor and assess patient's chronic conditions and comorbid symptoms for stability, deterioration, or improvement       Patient denies SI/HI/AVH. Denies anxiety and depression but appears anxious - concerned she is \"taking right medications\", educated patient on current medications and offered support. A&O x 3 , patient tangentia,l friendly and cooperative.  c/o of chronic bilateral knee pain d/t \"bone on bone\" patient refused prn medications offered for pain. Observed walking in antoine and to nurses station using walker appropriately with steady gait. No sxs of distress noted. No behaviors noted. Purposeful rounds continued Q 15 minutes.

## 2022-10-18 NOTE — PLAN OF CARE
Problem: Self Harm/Suicidality  Goal: Will have no self-injury during hospital stay  Description: INTERVENTIONS:  1. Q 30 MINUTES: Routine safety checks  2. Q SHIFT & PRN: Assess risk to determine if routine checks are adequate to maintain patient safety  10/17/2022 2121 by Lina Alves RN  Outcome: Progressing     Problem: Depression  Goal: Will be euthymic at discharge  Description: INTERVENTIONS:  1. Administer medication as ordered  2. Provide emotional support via 1:1 interaction with staff  3. Encourage involvement in milieu/groups/activities  4. Monitor for social isolation  10/17/2022 2121 by Lina Alves RN  Outcome: Progressing     Problem: Xiomara  Goal: Will exhibit normal sleep and speech and no impulsivity  Description: INTERVENTIONS:  1. Administer medication as ordered  2. Set limits on impulsive behavior  3. Make attempts to decrease external stimuli as possible  Outcome: Progressing     Problem: Psychosis  Goal: Will report no hallucinations or delusions  Description: INTERVENTIONS:  1. Administer medication as  ordered  2. Assist with reality testing to support increasing orientation  3. Assess if patient's hallucinations or delusions are encouraging self harm or harm to others and intervene as appropriate  10/17/2022 2121 by Lina Alves RN  Outcome: Progressing     Problem: Behavior  Goal: Pt/Family maintain appropriate behavior and adhere to behavioral management agreement, if implemented  Description: INTERVENTIONS:  1. Assess patient/family's coping skills and  non-compliant behavior (including use of illegal substances)  2. Notify security of behavior or suspected illegal substances which indicate the need for search of the family and/or belongings  3. Encourage verbalization of thoughts and concerns in a socially appropriate manner  4. Utilize positive, consistent limit setting strategies supporting safety of patient, staff and others  5.  Encourage participation in the decision making process about the behavioral management agreement  6. If a visitor's behavior poses a threat to safety call refer to organization policy.   7. Initiate consult with , Psychosocial CNS, Spiritual Care as appropriate  10/17/2022 2121 by Carrie Carpenter RN  Outcome: Progressing

## 2022-10-18 NOTE — BH NOTE
This nurse called report to [facility]. This nurse spoke to [facility] gave report for patient returning to [facility].

## 2022-10-18 NOTE — CARE COORDINATION
Navigator spoke with patient sister-in-law, Bell Shelby, who initially requested a statement of expert evaluation. Informed Bell Shelby, that the SEE is prepared and attempted to confirm which address to mail the document. Per Bell Shelby, she discussed guardianship with patient brother and sister. At this point, all family member options appear unsure about exploring guardianship. Navigator educated Bell Shelby on [employer] agencies within [facility] ([facility], [facility], and [facility]). Bell Shelby expressed feeling they may request court to appoint a 3rd party guardianship agency to act on patient's behalf. Bell Shelby requested that patient sisterCharles decides where the hospital should mail the SEE. Bell Shelby had multiple questions regarding patient care and psych/medical determinations. Navigator informed that there is no release of information on file at this time. Provided a general education on the process of admission to a behavioral health unit. Navigator also explained the process of completing a statement of expert evaluation and how typically this process is not pursued unless there is sufficient evidence of a cognitive impairment impacting patient's decision-making capacities. Bell Shelby did express concerns that family has received multiple letters from [employer] regarding patient to insurance payor to no longer cover patient's stay and that she will be converted to self pay. Per Bell Shelby, family are concerned about what patient's plan for discharge if she were to return back to a facility that will potentially release her in 12 days due to inability to pay for services out of pocket. Bell Shelby requested if patient sisterCharles, could be contacted to discuss. There does not appear to be an official release on file. Navigator notified  Supervisor Marli of these concerns. Marli to follow up with [employer] and patient sister, Charles Gar.     Electronically signed by ERNESTO Dixon LSW on 10/18/2022 at 3:40 PM

## 2022-10-18 NOTE — PLAN OF CARE
Problem: Chronic Conditions and Co-morbidities  Goal: Patient's chronic conditions and co-morbidity symptoms are monitored and maintained or improved  10/18/2022 1034 by Wei Alegria RN  Outcome: Progressing  10/17/2022 2121 by Govind Montes RN  Outcome: Sabine Leary (Taken 10/17/2022 1017 by James Parrish RN)  Care Plan - Patient's Chronic Conditions and Co-Morbidity Symptoms are Monitored and Maintained or Improved: Monitor and assess patient's chronic conditions and comorbid symptoms for stability, deterioration, or improvement     Problem: Self Harm/Suicidality  Goal: Will have no self-injury during hospital stay  Description: INTERVENTIONS:  1. Q 30 MINUTES: Routine safety checks  2. Q SHIFT & PRN: Assess risk to determine if routine checks are adequate to maintain patient safety  10/18/2022 1034 by Wei Alegria RN  Outcome: Progressing  10/17/2022 2121 by Govind Montes RN  Outcome: Progressing     Problem: Depression  Goal: Will be euthymic at discharge  Description: INTERVENTIONS:  1. Administer medication as ordered  2. Provide emotional support via 1:1 interaction with staff  3. Encourage involvement in milieu/groups/activities  4. Monitor for social isolation  10/18/2022 1034 by Wei Alegria RN  Outcome: Progressing  10/17/2022 2121 by Govind Montes RN  Outcome: Progressing     Problem: Xiomara  Goal: Will exhibit normal sleep and speech and no impulsivity  Description: INTERVENTIONS:  1. Administer medication as ordered  2. Set limits on impulsive behavior  3. Make attempts to decrease external stimuli as possible  10/18/2022 1034 by Wei Algeria RN  Outcome: Progressing  10/17/2022 2121 by Govind Montes RN  Outcome: Progressing     Problem: Psychosis  Goal: Will report no hallucinations or delusions  Description: INTERVENTIONS:  1. Administer medication as  ordered  2. Assist with reality testing to support increasing orientation  3.  Assess if patient's hallucinations or delusions are encouraging self harm or harm to others and intervene as appropriate  10/18/2022 1034 by Marisela Hoffman RN  Outcome: Progressing  10/17/2022 2121 by Kareem Bonilla RN  Outcome: Progressing     Problem: Behavior  Goal: Pt/Family maintain appropriate behavior and adhere to behavioral management agreement, if implemented  Description: INTERVENTIONS:  1. Assess patient/family's coping skills and  non-compliant behavior (including use of illegal substances)  2. Notify security of behavior or suspected illegal substances which indicate the need for search of the family and/or belongings  3. Encourage verbalization of thoughts and concerns in a socially appropriate manner  4. Utilize positive, consistent limit setting strategies supporting safety of patient, staff and others  5. Encourage participation in the decision making process about the behavioral management agreement  6. If a visitor's behavior poses a threat to safety call refer to organization policy. 7. Initiate consult with , Psychosocial CNS, Spiritual Care as appropriate  10/18/2022 1034 by Marisela Hoffman RN  Outcome: Progressing  10/17/2022 2121 by Kareem Bonilla RN  Outcome: Progressing     Problem: Anxiety  Goal: Will report anxiety at manageable levels  Description: INTERVENTIONS:  1. Administer medication as ordered  2. Teach and rehearse alternative coping skills  3. Provide emotional support with 1:1 interaction with staff  10/18/2022 1034 by Marisela Hoffman RN  Outcome: Progressing  10/17/2022 2121 by Kareem Bonilla RN  Outcome: Progressing     Problem: Sleep Disturbance  Goal: Will exhibit normal sleeping pattern  Description: INTERVENTIONS:  1. Administer medication as ordered  2. Decrease environmental stimuli, including noise, as appropriate  3.  Discourage social isolation and naps during the day  10/18/2022 1034 by Marisela Hoffman RN  Outcome: Progressing  10/17/2022 2121 by Kareem Bonilla RN  Outcome: Progressing Problem: Involuntary Admit  Goal: Will cooperate with staff recommendations and doctor's orders and will demonstrate appropriate behavior  Description: INTERVENTIONS:  1. Treat underlying conditions and offer medication as ordered  2. Educate regarding involuntary admission procedures and rules  3. Contain excessive/inappropriate behavior per unit and hospital policies  Outcome: Progressing     Problem: Self Care Deficit  Goal: Return ADL status to a safe level of function  Description: INTERVENTIONS:  1. Administer medication as ordered  2. Assess ADL deficits and provide assistive devices as needed  3. Obtain PT/OT consults as needed  4. Assist and instruct patient to increase activity and self care as tolerated  10/18/2022 1034 by Perla Meeks RN  Outcome: Progressing  10/17/2022 2121 by Aidee Mosley RN  Outcome: Progressing     Problem: Spiritual Care  Goal: Pt/Family able to move forward in process of forgiving self, others, and/or higher power  Description: INTERVENTIONS:  1. Assist patient/family to overcome blocks to healing by use of spiritual practices (prayer, meditation, guided imagery, reiki, breath work, etc). 2. De-myth guilt and help patient/family identify possible irrational spiritual/cultural beliefs and values. 3. Explore possibilities of making amends & reconciliation with self, others, and/or a greater power. 4. Guide patient/family in identifying painful feelings of guilt. 5. Help patient/famiy explore and identify spiritual beliefs, cultural understandings or values that may help or hinder letting go of issue. 6. Help patient/family explore feelings of anger, bitterness, resentment. 7. Help patient/family identify and examine the situation in which these feelings are experienced. 8. Help patient/family identify destructive displacement of feelings onto other individuals. 9. Invite use of sacraments/rituals/ceremonies as appropriate (e.g. - confession, anointing, smudging).   10. Refer patient/family to formal counseling and/or to [facility] for further support work. 10/18/2022 1034 by Michelle Cherry RN  Outcome: Progressing  10/17/2022 2121 by Mekhi Ramirez RN  Outcome: Progressing     Problem: Skin/Tissue Integrity  Goal: Absence of new skin breakdown  Description: 1. Monitor for areas of redness and/or skin breakdown  2. Assess vascular access sites hourly  3. Every 4-6 hours minimum:  Change oxygen saturation probe site  4. Every 4-6 hours:  If on nasal continuous positive airway pressure, respiratory therapy assess nares and determine need for appliance change or resting period.   10/18/2022 1034 by Michelle Cherry RN  Outcome: Progressing  10/17/2022 2121 by Mekhi Ramirez RN  Outcome: Progressing     Problem: Pain  Goal: Verbalizes/displays adequate comfort level or baseline comfort level  10/18/2022 1034 by Michelle Cherry RN  Outcome: Progressing  10/17/2022 2121 by Mekhi Ramirez RN  Outcome: Progressing

## 2022-10-18 NOTE — DISCHARGE SUMMARY
DISCHARGE SUMMARY      Patient ID:  Gaby Weaver  [id number]  57 y.o.  1960    Admit date: 10/10/2022    Discharge date and time: 10/18/2022    Admitting Physician: Lance Choudhary MD     Discharge Physician: Dr Ximena Cassidy MD    Discharge Diagnoses:   Patient Active Problem List   Diagnosis    HTN (hypertension)    NSTEMI (non-ST elevated myocardial infarction) ([facility] [address]    Cerebellar stroke ([facility] [address]    S/P craniotomy    S/P resection of meningioma    Basilar artery aneurysm ([facility] [address]    AMS (altered mental status)    Encounter for psychiatric assessment    Hyperactive Delirium     [facility] psychosis [facility]    Severe manic bipolar 1 disorder with psychotic behavior ([facility]       Admission Condition: poor    Discharged Condition: stable    Admission Circumstance:   Patient was seen multiple times in consult, psychiatry was consulted for AMS and she was brought to the hospital for the same reason. She was followed by both neurology and psychiatry. The nursing home refused to accept the patient back, because she was not at her baseline level of functioning. She was assessed again by psychiatric services and she was pink slipped at that time to the inpatient psychiatric unit. PAST MEDICAL/PSYCHIATRIC HISTORY:   Past Medical History:   Diagnosis Date    Brain aneurysm         Hypertension     Lumbar herniated disc        FAMILY/SOCIAL HISTORY:  No family history on file.   Social History     Socioeconomic History    Marital status:      Spouse name: Not on file    Number of children: Not on file    Years of education: Not on file    Highest education level: Not on file   Occupational History    Not on file   Tobacco Use    Smoking status: Former     Types: Cigarettes     Quit date: 2009     Years since quittin.7    Smokeless tobacco: Not on file   Substance and Sexual Activity    Alcohol use: No    Drug use: No    Sexual activity: Yes   Other Topics Concern    Not on file   Social History Narrative    Not on file     Social Determinants of Health     Financial Resource Strain: Not on file   Food Insecurity: Not on file   Transportation Needs: Not on file   Physical Activity: Not on file   Stress: Not on file   Social Connections: Not on file   Intimate Partner Violence: Not on file   Housing Stability: Not on file       MEDICATIONS:    Current Facility-Administered Medications:     divalproex (DEPAKOTE) DR tablet 500 mg, 500 mg, Oral, 2 times per day, REAGAN Escalera - CNP, 500 mg at 10/18/22 1022    OLANZapine zydis (ZYPREXA) disintegrating tablet 5 mg, 5 mg, Oral, BID, REAGAN Escalera - CNP, 5 mg at 10/18/22 1023    atorvastatin (LIPITOR) tablet 80 mg, 80 mg, Oral, Nightly, Lamont Rajan MD, 80 mg at 10/17/22 2120    enalapril (VASOTEC) tablet 10 mg, 10 mg, Oral, Daily, Lamont Rajan MD, 10 mg at 10/18/22 1023    metoprolol succinate (TOPROL XL) extended release tablet 50 mg, 50 mg, Oral, Daily, Lamont Rajan MD, 50 mg at 10/18/22 1017    miconazole (MICOTIN) 2 % powder, , Topical, BID, Ravin Escobar MD, Given at 10/18/22 1027    polyethylene glycol (GLYCOLAX) packet 17 g, 17 g, Oral, Daily PRN, Mis Rajan MD    potassium chloride (KLOR-CON M) extended release tablet 20 mEq, 20 mEq, Oral, BID WC, Lamont Rajan MD, 20 mEq at 10/18/22 1022    acetaminophen (TYLENOL) tablet 650 mg, 650 mg, Oral, Q4H PRN, Jamal Will MD, 650 mg at 10/16/22 1134    magnesium hydroxide (MILK OF MAGNESIA) 400 MG/5ML suspension 30 mL, 30 mL, Oral, Daily PRN, Jamal Will MD    nicotine (NICODERM CQ) 21 MG/24HR 1 patch, 1 patch, TransDERmal, Daily, Jamal Will MD    aluminum & magnesium hydroxide-simethicone (MAALOX) 200-200-20 MG/5ML suspension 30 mL, 30 mL, Oral, PRN, Jamal Will MD    hydrOXYzine pamoate (VISTARIL) capsule 50 mg, 50 mg, Oral, TID PRN, Jamal Will MD, 50 mg at 10/14/22 2105    haloperidol (HALDOL) tablet 3 mg, 3 mg, Oral, Q6H PRN **OR** haloperidol lactate (HALDOL) injection 3 mg, 3 mg, IntraMUSCular, Q6H PRN, Aggie Mcgowan MD, 3 mg at 10/11/22 1050    Examination:  /78   Pulse 75   Temp 97.6 °F (36.4 °C) (Temporal)   Resp 16   Ht 5' 4\" (1.626 m)   Wt 205 lb (93 kg)   SpO2 95%   BMI 35.19 kg/m²   Gait - steady    HOSPITAL COURSE[de-identified]  Following admission to the hospital, patient had a complete physical exam and blood work up, which she was medically cleared and admitted to [facility] for psychiatric evaluation and stabilization. The patient was monitored closely with suicide and appropriate precautions. She was started on Depakote 500 mg twice daily for mood stabilization reduce irritability and agitation, Zyprexa 5 mg twice daily for augmentation of treatment and psychosis. Patient eventually responded to treatment she did not have a rapid stabilization however she did eventually stabilized with the use of these medications. She was observed up on the unit interacting with peers having linear goal-directed conversations. Occasionally she would say something off the wall however she would go back to having normal conversation. She did endorse that her divorce was a significant stressor for her. At no time did she endorse any suicidal ideation intent or plan, rather she was severely manic with psychotic behaviors. The patient will return to her nursing home. An SEE was completed at the patients family request as they are considering pursuing guardianship of the patient. However it is not clear if they will in fact go through with the guardianship. She was encouraged to participate in group and other milieu activity and started to feel better with this combination of treatment. There has been significant progress in the improvement of symptoms since admission. The patient has been an active participant in his treatment, and discharge planning.  The patient was able to spontaneously describe with richness of detail their future plans and goals.      The patient was no longer suicidal, homicidal, psychotic or manic. She received the required treatment with medication, participated in group milieu, remained engaged in unit activities and learn appropriate coping skills. She was seen to be watching television playing games and socializing with peers and using the phone. There were no mention or gestures of self-harm or harm to others. Her mental status returned to baseline. The treatment team believes patient has obtained maximum benefit out of this hospitalization and does not meet criteria for inpatient hospitalization anymore. However she will continue to benefit from outpatient follow-up and treatment to maintain stability. Collateral information has been obtained and reconciled and there are no concerns about her safety. She has no access to guns or weapons. She appreciates the help that she received here. This patient no longer meets criteria for inpatient hospitalization. She was discharged home to her family in psychiatrically stable condition. No active SI/HI  Appetite:  [x] Normal  [] Increased  [] Decreased    Sleep:       [x] Normal  [] Fair       [] Poor            Energy:    [x] Normal  [] Increased  [] Decreased     SI [] Present  [x] Absent  HI  []Present  [x] Absent   Aggression:  [] yes  [] no  Patient is [x] able  [] unable to CONTRACT FOR SAFETY   Medication side effects(SE):  [x] None(Psych. Meds.) [] Other      Mental Status Examination on discharge:    Level of consciousness:  within normal limits   Appearance:  well-appearing  Behavior/Motor:  no abnormalities noted  Attitude toward examiner:  attentive and good eye contact  Speech:  spontaneous, normal rate and normal volume   Mood: euthymic  Affect:  mood congruent  Thought processes:  linear and goal directed   Thought content: The patient is devoid of suicidal or homicidal ideation intent or plan.   Devoid of auditory or visual hallucinations or other perceptual disturbances, there are no overt or covert signs of psychosis or paranoia. There are no neurovegetative signs of depression. Cognition:  oriented to person, place, and time   Concentration intact  Memory intact  Insight good   Judgement fair   Fund of Knowledge adequate      ASSESSMENT:  Patient symptoms are:  [x] Well controlled  [x] Improving  [] Worsening  [] No change    Reason for more than one antipsychotic:  [x] N/A  [] 3 Failed Monotherapy attempts (Drugs tried:)  [] Crossover to a new antipsychotic  [] Taper to Monotherapy from Polypharmacy  [] Augmentation of clozapine therapy due to treatment resistance to single therapy    Diagnosis:  Principal Problem:    Severe manic bipolar 1 disorder with psychotic behavior ([facility] [address]  Resolved Problems:    * No resolved hospital problems. *      LABS:    No results for input(s): WBC, HGB, PLT in the last 72 hours. No results for input(s): NA, K, CL, CO2, BUN, CREATININE, GLUCOSE in the last 72 hours. No results for input(s): BILITOT, ALKPHOS, AST, ALT in the last 72 hours. Lab Results   Component Value Date/Time    LABAMPH NOT DETECTED 10/03/2022 12:22 PM    BARBSCNU NOT DETECTED 10/03/2022 12:22 PM    LABBENZ NOT DETECTED 10/03/2022 12:22 PM    LABMETH NOT DETECTED 10/03/2022 12:22 PM    OPIATESCREENURINE NOT DETECTED 10/03/2022 12:22 PM    PHENCYCLIDINESCREENURINE NOT DETECTED 10/03/2022 12:22 PM    ETOH <10 10/03/2022 12:22 PM     Lab Results   Component Value Date/Time    TSH 1.552 10/12/2010 01:20 PM     No results found for: LITHIUM  No results found for: VALPROATE, CBMZ    RISK ASSESSMENT AT DISCHARGE: Low risk for suicide and homicide. Treatment Plan:  The patient's diagnosis, treatment plan, medication management were formulated after patient was seen directly by the attending physician and myself and all relevant documentation was reviewed.     Risk, benefit, side effects, possible outcomes of the medication and alternatives discussed with the patient and the patient demonstrated understanding. The patient was also educated that the outcome of treatment will depend on the medication compliance as directed by the prescribers along with regular follow-up, compliance with the labs and other work-up, as clinically indicated. Risks, benefits, side effects, drug-to-drug interactions and alternatives to treatment were discussed. Encourage patient to attend outpatient follow up appointment and therapy, outpatient follow-up appointments are scheduled prior to discharge. Patient was advised to call the outpatient provider, visit the nearest ED or call 911 if symptoms are not manageable. Patient's family member was contacted prior to the discharge, patient was discharged home to the nursing home in psychiatrically stable condition. Medication List        START taking these medications      divalproex 500 MG DR tablet  Commonly known as: DEPAKOTE  Take 1 tablet by mouth every 12 hours  Replaces: divalproex 125 MG capsule     OLANZapine zydis 5 MG disintegrating tablet  Commonly known as: ZYPREXA  Take 1 tablet by mouth in the morning and at bedtime     potassium chloride 20 MEQ extended release tablet  Commonly known as: KLOR-CON M  Take 1 tablet by mouth 2 times daily (with meals)            CONTINUE taking these medications      atorvastatin 80 MG tablet  Commonly known as: LIPITOR  Take 1 tablet by mouth nightly     CALCIUM + D PO     CO Q 10 PO     enalapril 10 MG tablet  Commonly known as: VASOTEC  Take 1 tablet by mouth daily     FISH OIL PO     KRILL OIL PO     metoprolol succinate 50 MG extended release tablet  Commonly known as: TOPROL XL  Take 1 tablet by mouth daily     miconazole 2 % powder  Commonly known as: MICOTIN  Apply topically 2 times daily.      MULTIVITAMINS PO     vitamin C 500 MG tablet  Commonly known as: ASCORBIC ACID            STOP taking these medications      divalproex 125 MG capsule  Commonly known as: DEPAKOTE SPRINKLE  Replaced by: divalproex 500 MG DR tablet     magnesium oxide 400 (241.3 Mg) MG Tabs tablet  Commonly known as: MAG-OX     potassium bicarb-citric acid 20 MEQ Tbef effervescent tablet  Commonly known as: EFFER-K               Where to Get Your Medications        Information about where to get these medications is not yet available    Ask your nurse or doctor about these medications  divalproex 500 MG DR tablet  OLANZapine zydis 5 MG disintegrating tablet  potassium chloride 20 MEQ extended release tablet     NOTE: This report was transcribed using voice recognition software. Every effort was made to ensure accuracy; however, inadvertent computerized transcription errors may be present.       TIME SPEND - 35 MINUTES TO COMPLETE THE EVALUATION, DISCHARGE SUMMARY, MEDICATION RECONCILIATION AND FOLLOW UP CARE     Signed:  REAGAN Geller CNP  10/18/2022  11:58 AM

## 2022-10-18 NOTE — BH NOTE
Patient alert and oriented x 4. Patient has a flat, blunt affect. Patient appears anxious, irritable and labile. Patient appears preoccupied. Patient demanding to \"speak to whoever is in charge in regards to discharging and medications. \" Patient states \"I can't be discharged, I need to talk to whoever is in charge of medications, I need to talk to my sister too before I cant be released until I do. \" Charge nurse notified. Lisa Lomeli NP notified of patients questions and concerns. Patient denies any suicidal ideations, homicidal ideations, auditory and visual hallucinations. Patient appears paranoid and delusional. Patient exhibited flight of ideas and tangential thoughts at this time. Patient is withdrawn and guarded at this time. Patient denies any depression and anxiety at this time. Patient reports pain is \"8/10 at this time, in my shoulders. \" Patient is self isolative and is at the nurses station looking at pictures to color. Patients states goal is to [facility] to somebody in charge about discharging. \" Patient does not appear in any distress at this time. Patient encouraged to attend groups. Will continue to monitor patient every 15 minute rounds to ensure patient safety.

## 2022-10-18 NOTE — PLAN OF CARE
Problem: Chronic Conditions and Co-morbidities  Goal: Patient's chronic conditions and co-morbidity symptoms are monitored and maintained or improved  10/18/2022 1321 by Diane Bonilla RN  Outcome: Completed  10/18/2022 1034 by Diane Bonilla RN  Outcome: Progressing     Problem: Self Harm/Suicidality  Goal: Will have no self-injury during hospital stay  Description: INTERVENTIONS:  1. Q 30 MINUTES: Routine safety checks  2. Q SHIFT & PRN: Assess risk to determine if routine checks are adequate to maintain patient safety  10/18/2022 1321 by Diane Bonilla RN  Outcome: Completed  10/18/2022 1034 by Diane Bonilla RN  Outcome: Progressing     Problem: Depression  Goal: Will be euthymic at discharge  Description: INTERVENTIONS:  1. Administer medication as ordered  2. Provide emotional support via 1:1 interaction with staff  3. Encourage involvement in milieu/groups/activities  4. Monitor for social isolation  10/18/2022 1321 by Diane Bonilla RN  Outcome: Completed  10/18/2022 1034 by Diane Bonilla RN  Outcome: Progressing     Problem: Xiomara  Goal: Will exhibit normal sleep and speech and no impulsivity  Description: INTERVENTIONS:  1. Administer medication as ordered  2. Set limits on impulsive behavior  3. Make attempts to decrease external stimuli as possible  10/18/2022 1321 by Diane Bonilla RN  Outcome: Completed  10/18/2022 1034 by Diane Bonilla RN  Outcome: Progressing     Problem: Psychosis  Goal: Will report no hallucinations or delusions  Description: INTERVENTIONS:  1. Administer medication as  ordered  2. Assist with reality testing to support increasing orientation  3.  Assess if patient's hallucinations or delusions are encouraging self harm or harm to others and intervene as appropriate  10/18/2022 1321 by Diane Bonilla RN  Outcome: Completed  10/18/2022 1034 by Diane Bonilla RN  Outcome: Progressing     Problem: Behavior  Goal: Pt/Family maintain appropriate behavior and adhere to behavioral management agreement, if implemented  Description: INTERVENTIONS:  1. Assess patient/family's coping skills and  non-compliant behavior (including use of illegal substances)  2. Notify security of behavior or suspected illegal substances which indicate the need for search of the family and/or belongings  3. Encourage verbalization of thoughts and concerns in a socially appropriate manner  4. Utilize positive, consistent limit setting strategies supporting safety of patient, staff and others  5. Encourage participation in the decision making process about the behavioral management agreement  6. If a visitor's behavior poses a threat to safety call refer to organization policy. 7. Initiate consult with , Psychosocial CNS, Spiritual Care as appropriate  10/18/2022 1321 by Trisha Mendez RN  Outcome: Completed  10/18/2022 1034 by Trisha Mendez RN  Outcome: Progressing     Problem: Anxiety  Goal: Will report anxiety at manageable levels  Description: INTERVENTIONS:  1. Administer medication as ordered  2. Teach and rehearse alternative coping skills  3. Provide emotional support with 1:1 interaction with staff  10/18/2022 1321 by Trisha Mendez RN  Outcome: Completed  10/18/2022 1034 by Trisha Mendez RN  Outcome: Progressing     Problem: Sleep Disturbance  Goal: Will exhibit normal sleeping pattern  Description: INTERVENTIONS:  1. Administer medication as ordered  2. Decrease environmental stimuli, including noise, as appropriate  3. Discourage social isolation and naps during the day  10/18/2022 1321 by Trisha Mendez RN  Outcome: Completed  10/18/2022 1034 by Trisha Mendez RN  Outcome: Progressing     Problem: Involuntary Admit  Goal: Will cooperate with staff recommendations and doctor's orders and will demonstrate appropriate behavior  Description: INTERVENTIONS:  1. Treat underlying conditions and offer medication as ordered  2. Educate regarding involuntary admission procedures and rules  3. Contain excessive/inappropriate behavior per unit and hospital policies  [id number] by Chantal Chung RN  Outcome: Completed  10/18/2022 1034 by Chantal Chung RN  Outcome: Progressing     Problem: Self Care Deficit  Goal: Return ADL status to a safe level of function  Description: INTERVENTIONS:  1. Administer medication as ordered  2. Assess ADL deficits and provide assistive devices as needed  3. Obtain PT/OT consults as needed  4. Assist and instruct patient to increase activity and self care as tolerated  10/18/2022 1321 by Chantal Chung RN  Outcome: Completed  10/18/2022 1034 by Chantal Chung RN  Outcome: Progressing     Problem: Spiritual Care  Goal: Pt/Family able to move forward in process of forgiving self, others, and/or higher power  Description: INTERVENTIONS:  1. Assist patient/family to overcome blocks to healing by use of spiritual practices (prayer, meditation, guided imagery, reiki, breath work, etc). 2. De-myth guilt and help patient/family identify possible irrational spiritual/cultural beliefs and values. 3. Explore possibilities of making amends & reconciliation with self, others, and/or a greater power. 4. Guide patient/family in identifying painful feelings of guilt. 5. Help patient/famiy explore and identify spiritual beliefs, cultural understandings or values that may help or hinder letting go of issue. 6. Help patient/family explore feelings of anger, bitterness, resentment. 7. Help patient/family identify and examine the situation in which these feelings are experienced. 8. Help patient/family identify destructive displacement of feelings onto other individuals. 9. Invite use of sacraments/rituals/ceremonies as appropriate (e.g. - confession, anointing, smudging). 10. Refer patient/family to formal counseling and/or to jaida community for further support work.   10/18/2022 1321 by Chantal Chung RN  Outcome: Completed  10/18/2022 1034 by Chantal Chung RN  Outcome: Progressing     Problem: Skin/Tissue Integrity  Goal: Absence of new skin breakdown  Description: 1. Monitor for areas of redness and/or skin breakdown  2. Assess vascular access sites hourly  3. Every 4-6 hours minimum:  Change oxygen saturation probe site  4. Every 4-6 hours:  If on nasal continuous positive airway pressure, respiratory therapy assess nares and determine need for appliance change or resting period.   10/18/2022 1321 by Rose Pacheco RN  Outcome: Completed  10/18/2022 1034 by Rose Pacheco RN  Outcome: Progressing     Problem: Pain  Goal: Verbalizes/displays adequate comfort level or baseline comfort level  10/18/2022 1321 by Rose Pacheco RN  Outcome: Completed  10/18/2022 1034 by Rose Pacheco RN  Outcome: Progressing

## 2022-10-18 NOTE — BH NOTE
Patient has order to be discharged. Patient denies any suicidal ideations, homicidal ideations, auditory and visual hallucinations at this time. Patient became increasingly agitated, irritable, and began to yell upon educating patient on discharge paperwork. Patient stated, \"I'm not leaving until I get some supper. \" Patient educated on discharge instructions. Patient refused to sign discharge paperwork. Abby T witnessed patient becoming increasingly agitated, irritable, and began to yell. Abby T witnessed patient refuse to sign discharge paperwork. Patient transported by [facility] transport in wheelchair. Patient off unit.

## 2022-10-18 NOTE — DISCHARGE INSTR - COC
Continuity of Care Form    Patient Name: Aliya Seymour   :  1960  MRN:  [id number]    Admit date:  10/10/2022  Discharge date:  10/18/2022    Code Status Order: Full Code   Advance Directives:     Admitting Physician:  Elvin Prabhakar MD  PCP: Jeronimo Hanna DO    Discharging Nurse: Joanie Petty [facility] Unit/Room#: 5474/2522-Q  Discharging Unit Phone Number: [phone]    Emergency Contact:   Extended Emergency Contact Information  Primary Emergency Contact: [address] [place] [address] Phone: [phone]  Relation: Brother/Sister  Low vision?  Yes  Secondary Emergency Contact: shin fink  Home Phone: [phone]  Mobile Phone: [phone]  Relation: Brother/Sister    Past Surgical History:  Past Surgical History:   Procedure Laterality Date    BRAIN SURGERY      CRANIOTOMY N/A 2020    CRANIOTOMY FRAMELESS STEREOTATIC FOR BRAIN TUMOR performed by Erasmo Salazar MD at [facility] 92 PERC CENTL NERV SYS  2020    IR OCCLUSIVE OR EMBOL PERC CENTL NERV SYS 2020 Diego Alfred MD [facility]    TONSILLECTOMY         Immunization History:   Immunization History   Administered Date(s) Administered    COVID-19, PFIZER GRAY top, DO NOT Dilute, (age 15 y+), IM, 27 mcg/0.3 mL 2022    COVID-19, PFIZER PURPLE top, DILUTE for use, (age 15 y+), 30mcg/0.3mL 2021, 2021, 2021       Active Problems:  Patient Active Problem List   Diagnosis Code    HTN (hypertension) I10    NSTEMI (non-ST elevated myocardial infarction) ([facility] I21.4    Cerebellar stroke ([facility] I63.9    S/P craniotomy Z98.890    S/P resection of meningioma Z98.890, Z86.018    Basilar artery aneurysm ([facility] I72.5    AMS (altered mental status) R41.82    Encounter for psychiatric assessment Z76.89    Hyperactive Delirium  R41.0    Acute psychosis ([facility] F23    Severe manic bipolar 1 disorder with psychotic behavior [facility] F31.2       Isolation/Infection:   Isolation            No Isolation          Patient Infection Status       Infection Onset Added Last Indicated Last Indicated By Review Planned Expiration Resolved Resolved By    None active    Resolved    COVID-19 (Rule Out) 10/03/22 10/03/22 10/03/22 COVID-19 & Influenza Combo (Ordered)   10/03/22 Rule-Out Test Resulted    COVID-19 (Rule Out) 08/31/20 08/31/20 08/31/20 Covid-19 Ambulatory (Ordered)   09/01/20 Rule-Out Test Resulted            Nurse Assessment:  Last Vital Signs: /78   Pulse 75   Temp 97.6 °F (36.4 °C) (Temporal)   Resp 16   Ht 5' 4\" (1.626 m)   Wt 205 lb (93 kg)   SpO2 95%   BMI 35.19 kg/m²     Last documented pain score (0-10 scale): Pain Level: 8 (Will reassess after pain intervention is implemented. Patient refused PRN medication at this time.)  Last Weight:   Wt Readings from Last 1 Encounters:   10/15/22 205 lb (93 kg)     Mental Status:  alert and oriented x 4. IV Access:  - None    Nursing Mobility/ADLs:  Walking   Assisted  Transfer  Assisted  Bathing  Assisted  Dressing  Assisted  Toileting  Assisted  Feeding  Independent  Med Admin  Assisted  Med Delivery    whole    Wound Care Documentation and Therapy:  Incision 08/28/20 Head Posterior (Active)   Number of days: 781        Elimination:  Continence: Bowel: Yes  Bladder: No  Urinary Catheter: None   Colostomy/Ileostomy/Ileal Conduit: No       Date of Last BM: 10/18/2022  No intake or output data in the 24 hours ending 10/18/22 1600  No intake/output data recorded. Safety Concerns: At Risk for Falls and Patient ambulates independently with walker. Patient uses walker appropriately. Fall precautions reviewed and implemented. Impairments/Disabilities:      Patient states \"lower extremity weakness due to history of strokes. \" Patient ambulates independently with walker. Patient uses walker appropriately.     Nutrition Therapy:  Current Nutrition Therapy:   - Oral Diet:  General    Routes of Feeding: Oral  Liquids: No Restrictions  Daily Fluid Restriction: no  Last Modified Barium Swallow with Video (Video Swallowing Test): swallow study on admission    Treatments at the Time of Hospital Discharge:   Respiratory Treatments: none  Oxygen Therapy:  is not on home oxygen therapy. Ventilator:    - No ventilator support    Rehab Therapies: None  Weight Bearing Status/Restrictions: No weight bearing restrictions  Other Medical Equipment (for information only, NOT a DME order):  walker  Other Treatments: None     Patient's personal belongings (please select all that are sent with patient):  Mayra     RN SIGNATURE:  Electronically signed by Vitaly Page RN on 10/18/22 at 4:31 PM EDT    CASE MANAGEMENT/SOCIAL WORK SECTION    Inpatient Status Date: ***    Readmission Risk Assessment Score:  Readmission Risk              Risk of Unplanned Readmission:  13           Discharging to Facility/ Agency   Name:   Address:  Phone:  Fax:    Dialysis Facility (if applicable)   Name:  Address:  Dialysis Schedule:  Phone:  Fax:    / signature: {Esignature:[id number]}    PHYSICIAN SECTION    Prognosis: {Prognosis:5200229921}    Condition at Discharge: [address] [place] [address] Patient Condition:[id number]}    Rehab Potential (if transferring to Rehab): {Prognosis:3372782383}    Recommended Labs or Other Treatments After Discharge: ***    Physician Certification: I certify the above information and transfer of Harvey Jay  is necessary for the continuing treatment of the diagnosis listed and that she requires {Admit to Appropriate Level of Care:27002} for {GREATER/LESS:778318805} 30 days.      Update Admission H&P: {CHP DME Changes in QMKRV:497522820}    PHYSICIAN SIGNATURE:  {Esignature:[id number]}

## 2022-10-18 NOTE — CARE COORDINATION
AIMEE spoke with Compa, liaison at [facility]. Compa states that [facility] will pick this pt up at 5 PM. AIMEE verbalized understanding.

## 2022-10-18 NOTE — CARE COORDINATION
AIMEE met with this pt to discuss discharge. Pt observed sitting in her room, eating breakfast. Pt states that she is ready to be discharged back to the nursing facility today. No concerns voiced. Pt appears flat and blunt. Pt's eye-contact is poor. Pt's appears preoccupied. Pt is currently denying SI/ HI/ hallucinations/ delusions. Pt will return [facility] [address] for long-term nursing home placement. Pt will be transported by [facility] [address]. Pt will continue to follow up outpatient with [facility] [address]. SW will continue to assist as needed. SW requested this pt sign a MANAN for her sister, but the pt refused. SW called James Domínguez at [facility] [address]. Jamesdylan Domínguez states that she will set up transportation and call this  back with a  time. SW verbalized understanding.     In order to ensure appropriate transition and discharge planning is in place, the following documents have been transmitted to [facility] [address], as the new outpatient provider:    The d/c diagnosis was transmitted to the next care provider  The reason for hospitalization was transmitted to the next care provider  The d/c medications (dosage and indication) were transmitted to the next care provider   The continuing care plan was transmitted to the next care provider

## 2022-10-18 NOTE — GROUP NOTE
Group Therapy Note    Date: 10/18/2022    Group Start Time: 1505  Group End Time: 7611  Group Topic: Cognitive Skills    [facility] 1    ERNESTO Spence LSW        Group Therapy Note    Attendees: 5       Patient's Goal:  Pt will be able to verbalize understanding regarding building new healthy habits. Notes:  Pt made connections and participated in group. Status After Intervention:  Improved    Participation Level:  Active Listener and Interactive    Participation Quality: Appropriate, Attentive, Sharing, and Supportive      Speech:  normal      Thought Process/Content: Flight of ideas      Affective Functioning: Congruent      Mood: euthymic      Level of consciousness:  Alert and Attentive      Response to Learning: Able to verbalize current knowledge/experience      Endings: None Reported    Modes of Intervention: Education, Support, Socialization, and Exploration      Discipline Responsible: /Counselor      Signature:  ERNESTO Spence LSW

## 2022-10-18 NOTE — BH NOTE
[facility]  Discharge Note    Pt discharged with followings belongings:   Other Valuables: Other (Comment) (greeting cards,magnifying glass, and ball)   Valuables sent home with patient. Patient educated on aftercare instructions: yes  . Patient verbalize understanding of AVS:  yes. Status EXAM upon discharge:  Mental Status and Behavioral Exam  Normal: No  Level of Assistance: Independent/Self  Facial Expression: Flat  Affect: Blunt  Level of Consciousness: Alert  Frequency of Checks: 4 times per hour, close  Mood:Normal: No  Mood: Anxious, Labile, Irritable  Motor Activity:Normal: Yes  Motor Activity: Increased  Eye Contact: Good  Observed Behavior: Preoccupied, Withdrawn, Guarded  Sexual Misconduct History: Current - no  Preception: [place] to person, [place] to time, [place] to place, [place] to situation  Attention:Normal: No  Attention: Distractible  Thought Processes: Tangential, Flight of ideas  Thought Content:Normal: No  Thought Content: Preoccupations, Paranoia  Depression Symptoms: No problems reported or observed.   Anxiety Symptoms: Generalized  Xiomara Symptoms: Labile  Hallucinations: None  Delusions: Yes  Delusions: Paranoid  Memory:Normal: Yes  Memory: Confabulation  Insight and Judgment: No  Insight and Judgment: Poor judgment, Poor insight    Tobacco Screening:  Practical Counseling, on admission, rohini X, if applicable and completed (first 3 are required if patient doesn't refuse) yes:            ( x) Recognizing danger situations (included triggers and roadblocks)                    ( x) Coping skills (new ways to manage stress,relaxation techniques, changing routine, distraction)                                                           (x ) Basic information about quitting (benefits of quitting, techniques in how to quit, available resources  ( x) Referral for counseling faxed to [facility] ( ) Patient refused counseling  ( ) Patient refused referral  ( ) Patient refused prescription upon discharge  ( ) Patient has not smoked in the last 30 days    Metabolic Screening:    Lab Results   Component Value Date    LABA1C 6.0 (H) 10/11/2022       Lab Results   Component Value Date    CHOL 118 10/11/2022    CHOL 182 08/24/2020    CHOL 213 (H) 05/11/2017    CHOL 200 (H) 04/08/2017    CHOL 171 05/12/2016    CHOL 193 05/09/2013    CHOL 190 05/10/2012    CHOL 198 05/12/2011    CHOL 219 (H) 10/12/2010     Lab Results   Component Value Date    TRIG 128 10/11/2022    TRIG 133 08/24/2020    TRIG 182 (H) 05/11/2017    TRIG 233 (H) 05/12/2016    TRIG 211 (H) 05/09/2013    TRIG 240 (H) 05/10/2012    TRIG 260 (H) 05/12/2011    TRIG 159 (H) 10/12/2010     Lab Results   Component Value Date    HDL 33 10/11/2022    HDL 44 08/24/2020    HDL 47 05/11/2017    HDL 40 05/12/2016    HDL 49.0 05/09/2013    HDL 46.0 05/10/2012    HDL 48.0 05/12/2011    HDL 52.0 10/12/2010     No components found for: [facility]  Lab Results   Component Value Date    LABVLDL 26 10/11/2022    LABVLDL 27 08/24/2020    LABVLDL 36 05/11/2017    LABVLDL 47 05/12/2016       Macon Gosselin, RN

## 2022-10-18 NOTE — CARE COORDINATION
Per [employer], pt's sister Noy Romo reported that pt has received letters in the mail stating that she is being discharged from [facility]. Per Ceci Lemus from [facility], patient is a bed hold and there is no discharge date at this time. AIMEE called pt's sister to discuss concerns, no answer- left voicemail.     ERNESTO Cruz, Ana Laguerre Work Supervisor

## 2022-10-19 NOTE — CARE COORDINATION
Navigator placed phone call to sister Denisa Cordova regarding where the statement of expert evaluation should be mailed to. Jaylynnceleanor Cordova reports: [address]    Answered all questions regarding the guardianship process, provided support resources for guardianship ([employer], [employer], [employer]). [facility] reports that Medicaid did state they will pay for 30 more day at [address] [place] [address]. [facility] reports hoping that patient would be eligible for a lower level of care if she is able to return back to her baseline functioning. Navigator provided resources that can assist with screening ([facility]).      Electronically signed by ERNESTO Collado, JUAN F on 10/19/2022 at 9:59 AM

## 2023-05-26 ENCOUNTER — OFFICE VISIT (OUTPATIENT)
Dept: NEUROLOGY | Age: 63
End: 2023-05-26
Payer: MEDICARE

## 2023-05-26 ENCOUNTER — TELEPHONE (OUTPATIENT)
Dept: NEUROLOGY | Age: 63
End: 2023-05-26

## 2023-05-26 VITALS
SYSTOLIC BLOOD PRESSURE: 157 MMHG | BODY MASS INDEX: 36.19 KG/M2 | RESPIRATION RATE: 18 BRPM | TEMPERATURE: 97.4 F | OXYGEN SATURATION: 93 % | HEIGHT: 64 IN | HEART RATE: 68 BPM | DIASTOLIC BLOOD PRESSURE: 93 MMHG | WEIGHT: 212 LBS

## 2023-05-26 DIAGNOSIS — I72.5 BASILAR ARTERY ANEURYSM (HCC): Primary | ICD-10-CM

## 2023-05-26 DIAGNOSIS — G93.89 ENCEPHALOMALACIA: ICD-10-CM

## 2023-05-26 DIAGNOSIS — G25.9 EXTRAPYRAMIDAL AND MOVEMENT DISORDER: ICD-10-CM

## 2023-05-26 DIAGNOSIS — Z98.890 S/P COIL EMBOLIZATION OF CEREBRAL ANEURYSM: ICD-10-CM

## 2023-05-26 PROCEDURE — G8400 PT W/DXA NO RESULTS DOC: HCPCS | Performed by: PSYCHIATRY & NEUROLOGY

## 2023-05-26 PROCEDURE — 1123F ACP DISCUSS/DSCN MKR DOCD: CPT | Performed by: PSYCHIATRY & NEUROLOGY

## 2023-05-26 PROCEDURE — G8427 DOCREV CUR MEDS BY ELIG CLIN: HCPCS | Performed by: PSYCHIATRY & NEUROLOGY

## 2023-05-26 PROCEDURE — 3074F SYST BP LT 130 MM HG: CPT | Performed by: PSYCHIATRY & NEUROLOGY

## 2023-05-26 PROCEDURE — 99205 OFFICE O/P NEW HI 60 MIN: CPT | Performed by: PSYCHIATRY & NEUROLOGY

## 2023-05-26 PROCEDURE — G8417 CALC BMI ABV UP PARAM F/U: HCPCS | Performed by: PSYCHIATRY & NEUROLOGY

## 2023-05-26 PROCEDURE — 1036F TOBACCO NON-USER: CPT | Performed by: PSYCHIATRY & NEUROLOGY

## 2023-05-26 PROCEDURE — 3078F DIAST BP <80 MM HG: CPT | Performed by: PSYCHIATRY & NEUROLOGY

## 2023-05-26 PROCEDURE — 1090F PRES/ABSN URINE INCON ASSESS: CPT | Performed by: PSYCHIATRY & NEUROLOGY

## 2023-05-26 PROCEDURE — 3017F COLORECTAL CA SCREEN DOC REV: CPT | Performed by: PSYCHIATRY & NEUROLOGY

## 2023-05-26 RX ORDER — FLUOXETINE 10 MG/1
1 CAPSULE ORAL
COMMUNITY
Start: 2023-05-12

## 2023-05-30 NOTE — TELEPHONE ENCOUNTER
Spoke with Kevyn Parker at [address] [place] [address] she said she will give the message to the ordering provider.

## 2023-07-06 DIAGNOSIS — I72.5 BASILAR ARTERY ANEURYSM (HCC): Primary | ICD-10-CM

## 2023-07-13 ENCOUNTER — HOSPITAL ENCOUNTER (OUTPATIENT)
Dept: CT IMAGING | Age: 63
Discharge: HOME | End: 2023-07-15
Attending: PSYCHIATRY & NEUROLOGY
Payer: MEDICARE

## 2023-07-13 ENCOUNTER — HOSPITAL ENCOUNTER (OUTPATIENT)
Age: 63
Discharge: HOME | End: 2023-07-13
Attending: PSYCHIATRY & NEUROLOGY
Payer: MEDICARE

## 2023-07-13 DIAGNOSIS — Z98.890 S/P COIL EMBOLIZATION OF CEREBRAL ANEURYSM: ICD-10-CM

## 2023-07-13 DIAGNOSIS — I72.5 BASILAR ARTERY ANEURYSM (HCC): ICD-10-CM

## 2023-07-13 DIAGNOSIS — G93.89 ENCEPHALOMALACIA: ICD-10-CM

## 2023-07-13 LAB
BUN SERPL-MCNC: 7 MG/DL (ref 6–23)
CREAT SERPL-MCNC: 0.5 MG/DL (ref 0.5–1)

## 2023-07-13 PROCEDURE — 82565 ASSAY OF CREATININE: CPT

## 2023-07-13 PROCEDURE — 70496 CT ANGIOGRAPHY HEAD: CPT

## 2023-07-13 PROCEDURE — 84520 ASSAY OF UREA NITROGEN: CPT

## 2023-07-13 PROCEDURE — 6360000004 HC RX CONTRAST MEDICATION: Performed by: RADIOLOGY

## 2023-07-13 PROCEDURE — 36415 COLL VENOUS BLD VENIPUNCTURE: CPT

## 2023-07-13 RX ADMIN — IOPAMIDOL 65 ML: 755 INJECTION, SOLUTION INTRAVENOUS at 12:54

## 2023-07-24 ENCOUNTER — TELEPHONE (OUTPATIENT)
Dept: NEUROLOGY | Age: 63
End: 2023-07-24
Payer: MEDICARE

## 2023-07-24 DIAGNOSIS — I67.1 CEREBRAL ANEURYSM: Primary | ICD-10-CM

## 2023-07-24 PROCEDURE — 99441 PR PHYS/QHP TELEPHONE EVALUATION 5-10 MIN: CPT | Performed by: PSYCHIATRY & NEUROLOGY

## 2023-07-24 NOTE — TELEPHONE ENCOUNTER
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 Previous Patient of Chloe Pak have explained this in detail.      For all other issues she needs to talk to PCP    Time spent 9 min

## 2024-10-23 NOTE — PROGRESS NOTES
Per Dr. Leung [place], patient appears appropriate for ARU, but the discharge plan needs to be established as there are conflicting notes in the chart. Accepted to ARU pending discharge plan establishment, medical stability, precert approval, completed testing, and bed availability. [place] PACU/DS handoff report.  Elements of handoff report include, but not limited to:    [x] Handoff given to receiving nurse- SHARON Brooks  [x] Patient's name, procedure name, surgeon/s name  [x] Medical/social history including allergies  [x] Anesthesia provider, type and tolerance  [x] Code Status verification   [] Unusual events during the procedure   [] Estimated blood loss   [x] Level of consciousness/orientation  [x] Last vital signs including temperature  [x] Status of surgical site, dressing, and drainage tubes  [x] IV lines, jose, catheter care wipes completed   [x] Intake and output   [x] Medications given and effects  [x] Prior comfort/pain management interventions  [x] Tests and treatments performed: blood glucose , chest x-ray, EKG, and labs  [x] Review post op orders  [x] Personal belongings Clothes, Mobility aids, and Belongings sent with patient to inpatient room  [x] Other assessment findings

## (undated) DEVICE — PIN ADLT MAYFIELD RIGID MOLD FINGER

## (undated) DEVICE — GAUZE,SPONGE,AVANT,4"X4",4PLY,STRL,10/TR: Brand: MEDLINE

## (undated) DEVICE — GLOVE SURG SZ 65 THK91MIL LTX FREE SYN POLYISOPRENE

## (undated) DEVICE — SYRINGE,EAR/ULCER, 3 OZ, STERILE: Brand: MEDLINE

## (undated) DEVICE — TOTAL TRAY, 16FR 10ML SIL FOLEY, URN: Brand: MEDLINE

## (undated) DEVICE — LOCATOR RAD PASS SPHR MRK NAVIGATION BALL DISP STLTH STN

## (undated) DEVICE — BLADE CLP TAPR HD WET DRY CAPABILITY GTT IN CHARGING USE

## (undated) DEVICE — SPONGE LAP W18XL18IN WHT COT 4 PLY FLD STRUNG RADPQ DISP ST

## (undated) DEVICE — LABEL MED 4 IN SURG PANEL W/ PEN STRL

## (undated) DEVICE — TUBING, SUCTION, 3/16" X 12', STRAIGHT: Brand: MEDLINE

## (undated) DEVICE — Device

## (undated) DEVICE — SOLUTION IV IRRIG WATER 1000ML POUR BRL 2F7114

## (undated) DEVICE — DRAPE 24X23IN RADIOLOGY CASS ADHES STRIP

## (undated) DEVICE — PAD,NON-ADHERENT,3X8,STERILE,LF,1/PK: Brand: MEDLINE

## (undated) DEVICE — AGENT HEMSTAT W4XL8IN OXIDIZED REGENERATED CELOS ABSRB

## (undated) DEVICE — CLOTH SURG PREP PREOPERATIVE CHLORHEXIDINE GLUC 2% READYPREP

## (undated) DEVICE — GAUZE,SPONGE,4"X4",16PLY,XRAY,STRL,LF: Brand: MEDLINE

## (undated) DEVICE — CODMAN® SURGICAL PATTIES 1/2" X 1" (1.27CM X 2.54CM): Brand: CODMAN®

## (undated) DEVICE — DRAPE TAPE: Brand: CONVERTORS

## (undated) DEVICE — DOUBLE BASIN SET: Brand: MEDLINE INDUSTRIES, INC.

## (undated) DEVICE — 1000 S-DRAPE TOWEL DRAPE 10/BX: Brand: STERI-DRAPE™

## (undated) DEVICE — SURGICAL PROCEDURE PACK CRANIOTOMY CUST

## (undated) DEVICE — DRILL, WL 4MM, STRYKER SHAFT

## (undated) DEVICE — BIT DRILL 12MM STOP 54MM TWIST 1MM STER

## (undated) DEVICE — TRAP,MUCUS SPECIMEN,40CC: Brand: MEDLINE

## (undated) DEVICE — PRECISION ACORN

## (undated) DEVICE — BANDAGE,GAUZE,CONFORMING,4"X75",STRL,LF: Brand: MEDLINE

## (undated) DEVICE — SPONGE,NEURO,1"X3",XR,STRL,LF,10/PK: Brand: MEDLINE

## (undated) DEVICE — RANEY SCALP CLIP STERILE: Brand: AESCULAP

## (undated) DEVICE — CODMAN® SURGICAL PATTIES 1" X 1" (2.54CM X 2.54CM): Brand: CODMAN®

## (undated) DEVICE — 2.3MM TAPERED ROUTER

## (undated) DEVICE — GARMENT,MEDLINE,DVT,INT,CALF,MED, GEN2: Brand: MEDLINE

## (undated) DEVICE — 3M(TM) MEDIPORE(TM) +PAD SOFT CLOTH ADHESIVE WOUND DRESSING 3569: Brand: 3M™ MEDIPORE™

## (undated) DEVICE — SWABSTICK SURG PREP BENZOIN TINCTURE SINGLE ST

## (undated) DEVICE — 1.5L THIN WALL CAN: Brand: CRD

## (undated) DEVICE — NEEDLE HYPO 25GA L0.625IN BLU POLYPR HUB S STL REG BVL STR

## (undated) DEVICE — GOWN,SIRUS,FABRNF,L,20/CS: Brand: MEDLINE